# Patient Record
Sex: MALE | Race: WHITE | NOT HISPANIC OR LATINO | Employment: OTHER | URBAN - METROPOLITAN AREA
[De-identification: names, ages, dates, MRNs, and addresses within clinical notes are randomized per-mention and may not be internally consistent; named-entity substitution may affect disease eponyms.]

---

## 2017-01-03 ENCOUNTER — OFFICE VISIT (OUTPATIENT)
Dept: PAIN MEDICINE | Facility: CLINIC | Age: 62
End: 2017-01-03
Payer: MEDICAID

## 2017-01-03 VITALS
WEIGHT: 190 LBS | DIASTOLIC BLOOD PRESSURE: 75 MMHG | HEART RATE: 77 BPM | RESPIRATION RATE: 18 BRPM | BODY MASS INDEX: 25.73 KG/M2 | HEIGHT: 72 IN | SYSTOLIC BLOOD PRESSURE: 131 MMHG

## 2017-01-03 DIAGNOSIS — M19.041 PRIMARY OSTEOARTHRITIS OF RIGHT HAND: ICD-10-CM

## 2017-01-03 DIAGNOSIS — M50.30 DDD (DEGENERATIVE DISC DISEASE), CERVICAL: ICD-10-CM

## 2017-01-03 DIAGNOSIS — M47.812 SPONDYLOSIS OF CERVICAL REGION WITHOUT MYELOPATHY OR RADICULOPATHY: ICD-10-CM

## 2017-01-03 DIAGNOSIS — G89.4 CHRONIC PAIN DISORDER: ICD-10-CM

## 2017-01-03 DIAGNOSIS — M79.18 MYOFASCIAL PAIN: Primary | ICD-10-CM

## 2017-01-03 PROCEDURE — 99214 OFFICE O/P EST MOD 30 MIN: CPT | Mod: S$PBB,,, | Performed by: PHYSICIAN ASSISTANT

## 2017-01-03 PROCEDURE — 99214 OFFICE O/P EST MOD 30 MIN: CPT | Mod: PBBFAC | Performed by: PHYSICIAN ASSISTANT

## 2017-01-03 PROCEDURE — 99999 PR PBB SHADOW E&M-EST. PATIENT-LVL IV: CPT | Mod: PBBFAC,,, | Performed by: PHYSICIAN ASSISTANT

## 2017-01-03 RX ORDER — HYDROCODONE BITARTRATE AND ACETAMINOPHEN 5; 325 MG/1; MG/1
1 TABLET ORAL 3 TIMES DAILY PRN
Qty: 90 TABLET | Refills: 0 | Status: SHIPPED | OUTPATIENT
Start: 2017-02-02 | End: 2017-02-28 | Stop reason: SDUPTHER

## 2017-01-03 RX ORDER — HYDROCODONE BITARTRATE AND ACETAMINOPHEN 5; 325 MG/1; MG/1
1 TABLET ORAL 3 TIMES DAILY PRN
Qty: 90 TABLET | Refills: 0 | Status: SHIPPED | OUTPATIENT
Start: 2017-01-04 | End: 2017-01-03 | Stop reason: SDUPTHER

## 2017-01-03 NOTE — MR AVS SNAPSHOT
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       These Medications        Disp Refills Start End    hydrocodone-acetaminophen 5-325mg (NORCO) 5-325 mg per tablet 90 tablet 0 2017 3/4/2017    Take 1 tablet by mouth 3 (three) times daily as needed for Pain. - Oral    Pharmacy: [facility] [address] [place] [address] AT [facility] Ph #: [phone]         [facility]     [facility] -  Assistance  Registered nurses in the [facility] provide clinical advisement, health education, appointment booking, and other advisory services.  Call for this free service at [phone].             Medications           Message regarding Medications     Verify the changes and/or additions to your medication regime listed below are the same as discussed with your clinician today.  If any of these changes or additions are incorrect, please notify your healthcare provider.             Verify that the below list of  medications is an accurate representation of the medications you are currently taking.  If none reported, the list may be blank. If incorrect, please contact your healthcare provider. Carry this list with you in case of emergency.           Current Medications     acetaminophen (TYLENOL) 650 MG TbSR Take 650 mg by mouth every 8 (eight) hours.    amlodipine (NORVASC) 5 MG tablet TAKE 1 TABLET BY MOUTH EVERY DAY    atorvastatin (LIPITOR) 40 MG tablet TAKE 1 TABLET BY MOUTH ONCE DAILY    butalbital-acetaminophen-caffeine -40 mg (FIORICET, ESGIC) -40 mg per tablet TAKE 1 TABLET BY MOUTH EVERY 4 HOURS AS NEEDED FOR PAIN    cyclobenzaprine (FLEXERIL) 10 MG tablet TAKE 1/2 TO 1 TABLET BY MOUTH EVERY NIGHT AT BEDTIME AS NEEDED FOR SLEEP    FLUVIRIN [id number] 45 mcg (15 mcg x 3)/0.5 mL Susp     hydrocodone-acetaminophen 5-325mg (NORCO) 5-325 mg per tablet Starting on Feb 02, 2017. Take 1 tablet by mouth 3 (three) times daily as needed for Pain.    meloxicam (MOBIC) 15 MG tablet TAKE 1 TABLET(15 MG) BY MOUTH EVERY DAY    omeprazole (PRILOSEC) 20 MG capsule Take 1 capsule (20 mg total) by mouth once daily.    topiramate (TOPAMAX) 50 MG tablet Take 1 tablet (50 mg total) by mouth 2 (two) times daily.    albuterol 90 mcg/actuation inhaler Inhale 2 puffs into the lungs every 4 (four) hours as needed for Wheezing.    diclofenac sodium 1 % Gel Apply 2 g topically 3 (three) times daily. To the knee for pain           Clinical Reference Information           Vital Signs - Last Recorded  Most recent update: 1/3/2017  7:33 AM by Annie De Leon MA    BP Pulse Resp Ht Wt BMI    131/75 (BP Location: Right arm, Patient Position: Sitting, BP Method: Automatic) 77 18 6' (1.829 m) 86.2 kg (190 lb) 25.77 kg/m2      Blood Pressure          Most Recent Value    BP  131/75      Allergies as of 1/3/2017     Morphine      Immunizations Administered on Date of Encounter - 1/3/2017     None      Instructions      Protecting Your Neck:  Posture and Body Mechanics  Protecting your neck from injuries and pain involves practicing good posture and body mechanics. This may mean correcting bad habits you have related to the way you hold and move your body. The tips below can help you improve your posture and body mechanics.     Using good posture while at your workstation can help prevent pain or injury.   What Is Posture and Why Does It Matter?  Posture is the way you hold your body. For many of us, this means hunching over, thrusting the chin forward, and slouching the shoulders. But this kind of poor posture keeps muscles from properly supporting the neck and puts stress on muscles, disks, ligaments, and joints in your neck. As a result, injury and pain can occur.  How Is Your Posture?  Use a full-length mirror to check your posture. To begin, stand normally. Then slowly back up against a wall. Is there space between your head and the wall? Do you slouch your shoulders? Is your chin pointing up or down? All these can cause neck pain and injury.  Improving Your Posture  Follow these steps to improve your posture:  · Pull your shoulders back.  · Think of the ears, shoulders, and hips as a series of dots. Now, adjust your body to connect the dots in a straight line.  · Keep your chin level.  What Are Body Mechanics and Why Do They Matter?  The way you move and position your body during daily activities is called body mechanics. Good body mechanics help protect the neck. This means learning the right ways to stand, sit, and even sleep. So do whats best for your neck and practice good body mechanics.  Standing   To protect your neck while standing:  · Carry objects close to your body.  · Keep your ears and shoulders in a line while standing or walking.  · To lower yourself, bend at the knees with a straight back. Do this instead of looking down and reaching for objects.  · Work at eye level. Dont reach above your head or tilt your head  back.  Sitting   To protect your neck while sitting:  · Set up your workstation so your monitor is at eye level. Also, use a document guthrie when viewing papers or books.  · Keep your knees at or slightly below the level of your hips.  · Sit up straight, with feet flat on the floor. If your feet dont touch the floor, use a footrest.  · Avoid sitting or driving for long periods. Take frequent breaks.  Sleeping   To protect your neck while sleeping:  · Sleep on your back with a pillow under your knees, or on your side with a pillow between bent knees. This helps align the spine.  · Avoid using pillows that are too high or too low. Instead, use a neck roll or pillow under your neck while you sleep to keep the neck straight.  · Sleep on a mattress that supports you, with a pillow under your neck.  © 7251-2958 [employer] [facility], [address] [place] [address]. All rights reserved. This information is not intended as a substitute for professional medical care. Always follow your healthcare professional's instructions.        Reach and Hold Exercise    Do this exercise on your hands and knees. Keep your knees under your hips and your hands under your shoulders. Keep your spine in a neutral position (not arched or sagging). Keep your ears in line with your shoulders. Hold for a few seconds before starting the exercise.  · Tighten your abdominal muscles and raise one arm straight in front of you, palm down. Hold for 5 seconds, then lower. Repeat 5 times.  · Do the exercise again, this time lifting your arm to the side. Repeat 5 times.  · Do the exercise again, this time lifting your arm backward, palm up. Repeat 5 times.  Switch sides and do each exercise with the other arm.  © 7073-0063 [employer] [facility], [address] [place] [address]. All rights reserved. This information is not intended as a substitute for professional medical care. Always follow your healthcare professional's  instructions.        Shoulder and Upper Back Stretch  To start, stand tall with your ears, shoulders, and hips in line. Your feet should be slightly apart, positioned just under your hips. Focus your eyes directly in front of you.  this position for a few seconds before starting your exercise. This helps increase your awareness of proper posture.  · Reach overhead and slightly back with both arms. Keep your shoulders and neck aligned and your elbows behind your shoulders.  · With your palms facing the ceiling, turn your fingers inward.  · Take a deep breath. Breathe out and lower your elbows toward your buttocks. Hold for 5 seconds, then return to starting position.  · Repeat 3 times.       © 2871-6390 [employer] [facility], [address] [place] [address], [place] [address]. All rights reserved. This information is not intended as a substitute for professional medical care. Always follow your healthcare professional's instructions.        Torticollis (Wry Neck)  Torticollis occurs when muscles on one side of the neck contract (tighten). This causes the neck to twist or tilt to the side. The muscles may also be quite sore. It affects mainly children and young adults, often appearing overnight. It can also affect infants who develop tight neck muscles on one side.  What Causes Torticollis?  Causes of torticollis include:  · Damage to the neck muscles from an accident or other trauma  · Side effect of certain medications or drugs  · Infection of the airways, such as a sore throat  When to Go to the Emergency Room (ER)  All neck problems should be checked by a healthcare provider within 24 hours. Seek emergency care if you can't reach your doctor or these symptoms are present:  · Trouble breathing or swallowing  · Numbness or weakness in the arms and legs  · Trouble walking or speaking  What to Expect in the ER  The neck will be examined, and questions about any current or former medical problems will be asked. X-rays of the  neck may be taken to check for broken bones.  Treatment  The goal in treating torticollis is to relax the neck muscles. The best approach will depend on the cause of the problem. In most cases, one or more of the following may be given:  · Medications to help relax the muscles and reduce swelling  · Hot and cold compresses to help ease muscle tightness  · Botulinum toxin (Botox) injections to prevent further muscle spasms  · A soft neck collar to ease discomfort and help healing  · Physical therapy to help stretch and relax the muscles  Follow-up  Depending upon the cause, torticollis often goes away on its own. Follow up with your healthcare provider as instructed. If symptoms become worse, call your doctor or return to the ER.  © 5556-7836 [employer] [facility], [address] [place] [address], [place] [address]. All rights reserved. This information is not intended as a substitute for professional medical care. Always follow your healthcare professional's instructions.        Understanding Neck Problems       If you suffer from neck pain, youre not alone. Many people have neck pain at some point in their lives. Problems such as poor posture, injury, and wear and tear can lead to neck pain. Your health care provider will work with you to find the treatment thats best for your neck.  Types of Neck Problems  The following problems can cause pain or injury in your neck:  · Strains and sprains: Strains (stretched or torn muscles) and sprains (stretched or torn ligaments) can cause neck pain. Strains and sprains can occur during an accident, or when you overuse your neck through repetitive motion. They can also cause your muscles and ligaments to become inflamed (swollen and painful).  · Whiplash and other injuries: Whiplash can result when an impact throws your head, forcing your neck too far forward (hyperflexion), then too far backward (hyperextension). When combined, the two motions can cause a painful injury to different parts  of your neck, such as muscles, ligaments, or joints. The most common cause of whiplash is a car accident. But it can also happen during a fall or sports injury.  · Weakened disks: A simple action, such as a sneeze or a cough, can cause one of your disks to bulge (herniate). A herniated disk can put pressure on your nerve and cause pain. Over time, disks can also thin out (degenerate). Flattened disks dont cushion vertebrae well and can cause vertebrae to rub together. Rubbing vertebrae can pinch nerves and cause pain.  · Weakened joints: Aging and injury can cause joints to slowly degenerate. Thinned joints can also cause vertebrae to rub together. This can cause abnormal growths of bone (bone spurs) to form on vertebrae. Bone spurs put pressure on nerves, causing pain.  Common Symptoms  If you have a neck problem, you may have one or more of the following symptoms:  · Muscle tension and spasm: You may not be able to move your neck, arms, or shoulders comfortably if you have muscle tension or stiffness in your neck. If your symptoms arent relieved, you may experience muscle spasms, or knots of contracted tissue (trigger points) in areas of your neck and shoulders.  · Aches and pains: Dull aches in your head or neck, sharp pains, and swelling of the soft tissue of your neck and shoulders are common symptoms. If theres pressure on the nerves in your neck, you may feel pain in your arms or hands (referred pain).  · Numbness or weakness: If you injure the nerves in your neck, you may experience numbness, tingling, or weakness in your shoulders, arms, or hands. These symptoms arise when disks or bone spurs press on the nerves in your neck.  © 4169-2166 [employer] [facility], [address] [place] [address], [place] [address]. All rights reserved. This information is not intended as a substitute for professional medical care. Always follow your healthcare professional's instructions.        Neck Exercises: Active Neck Rotation  To  start, lie on your back, knees bent and feet flat on the floor. Keep your ears, shoulders, and hips aligned, but dont press your lower back to the floor. Rest your hands on your pelvis. Breathe deeply and relax.  · Use your neck muscles to turn your head to one side until you feel a stretch in the muscles.  · Hold for 5 seconds. Then turn to the other side.  · Repeat 5 times on each side.  Note: Keep your shoulders on the floor. Dont lift or tuck your chin as you turn your head.  © 5915-8621 Dinh \[facility]", [address] [place] [address]. All rights reserved. This information is not intended as a substitute for professional medical care. Always follow your healthcare professional's instructions.        Neck Exercises: Arm Lift            To start, lie on your back, knees bent and feet flat on the floor. Keep your ears, shoulders, and hips aligned, but dont press your lower back to the floor. Rest your hands on your pelvis. Breathe deeply and relax.  1. Raise one arm overhead, then lower it. As you lower that arm, raise the other arm.  2. Continue to move both arms in slow, smooth arcs. Keep your arms straight and your head and neck relaxed.  3. Repeat 10 times with each arm.  For your safety, check with your healthcare provider before starting an exercise program.   © 7812-0109 [facility], [address] [place] [address]. All rights reserved. This information is not intended as a substitute for professional medical care. Always follow your healthcare professional's instructions.        Exercises: Neck Isometrics  To start, sit in a chair with your feet flat on the floor. Your weight should be slightly forward so that youre balanced evenly on your buttocks. Relax your shoulders and keep your head level. Using a chair with arms may help you keep your balance.  4. Press your palm against your forehead. Resist with your neck muscles. Hold for 10 seconds. Relax. Repeat 5 times.  5. Do the  exercise again, pressing on the side of your head. Repeat 5 times. Switch sides.  6. Do the exercise again, pressing on the back of your head. Repeat 5 times.       For your safety, check with your healthcare provider before starting an exercise program.   © 4741-4419 [place] [address]. All rights reserved. This information is not intended as a substitute for professional medical care. Always follow your healthcare professional's instructions.        Neck Exercises: Passive Neck Rotation        To start, lie on your back, knees bent and feet flat on the floor. Keep your ears, shoulders, and hips aligned, but dont press your lower back to the floor. Rest your hands on your pelvis. Breathe deeply and relax.  · With your neck relaxed, place the palm of one hand on your forehead. Use your hand to turn your head to one side until you feel a stretch in the neck muscles. Do not push through pain.  · Hold for 5 seconds. Then turn to the other side.  · Repeat 5 times on each side.   Note: Keep your shoulders on the floor. Dont lift your chin as you turn your head.  © 6442-7746 [employer] [facility], [address] [place] [address]. All rights reserved. This information is not intended as a substitute for professional medical care. Always follow your healthcare professional's instructions.        Neck Pain [No Trauma]  There are several possible causes of neck pain without injury:  · You can get a minor ligament sprain or muscle strain from a sudden minor neck movement. Sleeping with your neck in an awkward position can also cause this.  · Some persons respond to emotional stress by tensing the muscles of their neck, shoulders and upper back. Chronic spasm in these muscles can cause neck pain and sometimes headaches.  · Gradual wear and tear of the joints in the spine can cause degenerative arthritis. This can be a source of occasional or chronic neck pain.  · With aging or  repeated small injuries to the neck, the spinal disks (the cushions between each spinal bone) may bulge and put pressure on a nearby spinal nerve. This causes tingling, pain or numbness spreading from the neck to the shoulder, arm or hand on one side.  Acute neck pain usually gets better in one to two weeks. Neck pain related to disk disease, arthritis in the spinal joints or spinal stenosis (narrowing of the spinal canal) can become chronic and last for months or years.  Unless you had a forceful physical injury (for example, a car accident or fall), X-rays are usually not ordered for the initial evaluation of neck pain. If pain continues and does not respond to medical treatment, x-rays and other tests may be performed at a later time.  Home Care:  · Rest and relax the muscles. Use a comfortable pillow that supports the head and keeps the spine in a neutral position. The position of the head should not be tilted forward or backward. A rolled up towel may help for a custom fit.  · Some persons find relief with heat (hot shower, hot bath or heating pad) and massage, while others prefer cold packs (crushed or cubed ice in a plastic bag, wrapped in a towel) . Try both and use the method that feels best for 20 minutes several times a day.  · You may use acetaminophen (Tylenol) or ibuprofen (Motrin, Advil) to control pain, unless another medicine was prescribed. [ NOTE : If you have chronic liver or kidney disease or ever had a stomach ulcer or GI bleeding, talk with your doctor before using these medicines.]  Follow Up  with your physician or this facility if your symptoms do not show signs of improvement after one week. Physical therapy or further tests may be needed.  [NOTE: A radiologist will review any X-rays or CT scans that were taken. We will notify you of any new findings that may affect your care.]  Get Prompt Medical Attention  if any of the following occur:  · Pain becomes worse or spreads into one or both  arms  · Weakness or numbness in one or both arms  · Increasing headache  · Neck swelling, difficulty or painful swallowing  · Fever of 100.4ºF (38ºC) or higher, or as directed by your healthcare provider  © 0613-6872 [facility], [address] [place] [address], [place] [address]. All rights reserved. This information is not intended as a substitute for professional medical care. Always follow your healthcare professional's instructions.      Neck Problems: Relieving Your Symptoms  The first goal of treatment is to relieve your symptoms. Your health care provider may recommend self-care treatments. These include resting, applying ice and heat, taking medication, and doing exercises. Your health care provider may also recommend that you see a physical therapist, who can teach you ways to care for and strengthen your neck.     Heat relaxes sore muscles and helps relieve spasms.   Self-Care Treatments  Pain can end quickly or last awhile. Either way, youll want relief as soon as possible. Your health care provider can tell you which treatments to do at home to help relieve your pain.  · Lying down for a short time takes pressure from the head off the neck.  · Ice and heat can help reduce pain. To bring down swelling, rest an ice pack wrapped in a thin towel on your neck for 15 minutes. To relax sore muscles, apply a warm, wet towel to the area. Or take a warm bath or shower.  · Over-the-counter medications, such as ibuprofen, naproxen, and aspirin, can help reduce pain and swelling. Acetaminophen can help relieve pain. Use these only as directed.  · Exercises can relax muscles and prevent stiffness. To prepare, drape a warm, wet towel around your neck and shoulders for 5 minutes. Remove the towel. Then do any exercises recommended to you by your health care provider.  Physical Therapy  If self-care treatments arent helping relieve neck pain, your health care provider may suggest one or more sessions of physical therapy.  Physical therapy is performed by a specialist trained to treat injuries. Your physical therapist (PT) will teach you how to strengthen muscles, improve the spines alignment, and help you move properly. Treatment methods used in physical therapy may include:  · Heat. A special heating pad called a neck pack may be applied to your neck.  · Exercises. Your PT will teach you exercises to help strengthen your neck and improve its range of motion.  · Joint mobilization. The PT gently moves your vertebrae to help restore motion in your neck joints and reduce neck pain.  · Soft tissue mobilization. The PT massages and stretches the muscles in your neck and shoulders.  · Electrical stimulation. Electrical impulses are sent into your neck. This helps reduce soreness and inflammation.  · Education in body mechanics. The PT shows you ways to position and move your body that protect the neck.  Other Treatments  If physical therapy doesnt relieve your neck pain, your health care provider may suggest other treatments. For example, medications or injections can help relieve pain and swelling. In some cases, surgery may be needed to treat neck problems.  © 2057-3385 [facility], [address] [place] [address], [place] [address]. All rights reserved. This information is not intended as a substitute for professional medical care. Always follow your healthcare professional's instructions.           Smoking Cessation     If you would like to quit smoking:   You may be eligible for free services if you are a [place] resident and started smoking cigarettes before September 1, 1988.  Call the Smoking Cessation Trust (SCT) toll free at (599) 712-9592 or (124) 976-5576.   Call 1-800-QUIT-NOW if you do not meet the above criteria.

## 2017-01-03 NOTE — PROGRESS NOTES
Chief Pain Complaint:  Neck Pain, Bilateral Arm Pain    History of Present Illness:  This patient is a 61 y.o. male who presents today complaining of the above noted pain/s. The patient describes this pain as follows.    - duration of pain: pain for years   - timing: constant   - character: sharp, aching  - radiating, dermatomal: extends to the shoulders b/l, nondermatomal  - antecedent trauma, prior spinal surgery: no prior trauma, no prior spinal surgery, bilateral shoulder surgery  - pertinent negatives: No fever, No chills, No weight loss, No bladder dysfunction, No bowel dysfunction, No saddle anesthesia  - pertinent positives: generalized nonspecific Upper Extremity weakness bilaterally    - medications, other therapies tried (physical therapy, injections):     >> Norco, flexeril, gabapentin, Mobic, Topamax, Fioricet    >> Has previously undergone Physical Therapy - which he does not find helpful but does exercises at home    >> Has previously undergone spinal injection/s      IMAGING / Labs / Studies (reviewed on 1/3/2017):    5-12-16 XR Lumbar and Thoracic:  Findings: There is slight levoscoliotic curvature of the lumbar spine.  There is grade 2 anterolisthesis of L5 on S1 with minimal grade 1 anterolisthesis of L4 on L5 and slight retrolisthesis of L3 on L4.  Moderate to severe disc height loss present at L5-S1 with mild disc height loss at L3-L4 and L4-L5. Bilateral facet arthropathy present at L4-L5 and L5-S1. No acute fractures visualized.  The remaining visualized osseous and soft tissue structures demonstrate no appreciable abnormality.    Findings: There is mild dextroscoliotic curvature of the thoracic spine.  There are multiple mild age-indeterminate compression deformities involving the mid and lower thoracic spine with suspected involvement of T6-T11.  There is mild multilevel disc height loss noted throughout the thoracic spine with associated disc osteophyte complex production. Posterior  elements appear grossly intact.   The remaining visualized osseous and soft tissue structures demonstrate no appreciable abnormality.      4/28/14 MRI Cervical Spine Without Contrast    Narrative Study:   Cervical spine MRI without contrast.04/28/14 11:09:00  Technique:  Multiplanar non contrast images obtained on the cervical spine.   History: Left neck pain.  Left shoulder pain.  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  No abnormal signal in the cord.  The craniocervical junction is normal.  C2-3: Facet hypertrophy on the left side with mild left sided neural foraminal narrowing  C3-4: Disk space narrowing.  Broad-based osteophyte disk foot.  Bilateral uncovertebral-joint disease with moderate severe neural frontal narrowing.  C4-5: Disk space narrowing.  Broad-based osteophyte disk foot.  Bilateral uncovertebral joint disease with mild to severe neural foraminal narrowing.  Decreased CSF spaces anterior-posterior to the cord.  C5-6: Broad-based osteophyte and disk bulge.  Bilateral uncovertebral joint disease with mild to moderate bilateral bony neural frontal narrowing greater on the left side.  Disk protrusion into the left lateral recess.  C6-7: Disk space narrowing.  Broad-based osteophyte and disk bulge bilateral uncovertebral joint disease with mild neural foraminal narrowing.  C7-T1: Mild to space narrowing.  Mild uncovertebral joint disease.  No significant neural frontal narrowing.         Review of Systems:  CONSTITUTIONAL: patient denies any fever, chills, or weight loss  SKIN: patient denies any rash or itching  RESPIRATORY: patient denies having any shortness of breath  GASTROINTESTINAL: patient denies having any diarrhea, constipation, or bowel incontinence  GENITOURINARY: patient denies having any abnormal bladder function    MUSCULOSKELETAL:  - patient complains of the above noted pain/s (see chief pain complaint)    NEUROLOGICAL:   - pain as above  - strength in Upper extremities is  decreased, BILATERALLY  - sensation in Upper extremities is abnormal, BILATERALLY  - patient denies any loss of bowel or bladder control      PSYCHIATRIC: patient denies any change in mood      Physical Exam:  Visit Vitals    /75 (BP Location: Right arm, Patient Position: Sitting, BP Method: Automatic)    Pulse 77    Resp 18    Ht 6' (1.829 m)    Wt 86.2 kg (190 lb)    BMI 25.77 kg/m2   (reviewed on 1/3/2017)    General: alert and oriented  Gait: normal gait  Skin: No rashes, No discoloration, No obvious lesions  HEENT: EOMI  Respiratory: respirations nonlabored    Musculoskeletal:  - Any pain on flexion, extension, rotation:    >> pain on flexion, extension, and rotation of cervical spine  - Spurling's Test: causes No radiating pain  - Any tenderness to palpation across paraspinal muscles, joints, bursae:     >> tender to palpation across cervical paraspinals    >> TTP over bilateral hand joints, worse on right hand  - Right hand in brace today    Neuro:  - Extremity Strength:     >> LEFT :: 5/5    >> RIGHT :: 5/5  - Extremity Reflexes:    >> LEFT  :: 1+    >> RIGHT :: 1+  - Sensory (sensation to light touch):    >> LEFT :: intact    >> RIGHT :: intact  - Luciano's sign:     >> LEFT :: negative    >> RIGHT :: negative      Psych:  Mood and affect is appropriate      Assessment:  Cervical Spondylosis  Myofascial Pain  Cervical DDD      Plan:  Patient returns for follow-up. He has widespread joint pain, including cervical facet pain along with myofascial pain.   - He had trigger point injections with Dr. Nunes in September and with me on 11-29-16. He is still reporting great relief from this.  - Will refill gabapentin 300mg TID and Norco 5mg TID PRN x 2 months, which he reports is helping his pain.  - We can consider adding an antidepressant, possibly Cymbalta, moving forward.   - [place]  reviewed and appropriate. He last filled on 12-7-16.  - Will order UDS today to ensure compliance.   RTC in 2 months  for med refill. I discussed the risks, benefits, and alternatives to potential treatment options. All questions and concerns were fully addressed today in clinic. Dr. Nunes was consulted regarding the patient plan and agrees.          >>PDI:  8-11-16 :: 74  9-6-16 :: 120  11-8-16 :: 54  1/3/2017 :: 129    >>UDS:  8-11-16 :: appropriate (+hydrocodone metabolites, +barbituates)  1/3/2017 :: pending    >>Opioid Risk Tool:  1/3/2017 :: 6

## 2017-01-18 ENCOUNTER — TELEPHONE (OUTPATIENT)
Dept: PAIN MEDICINE | Facility: CLINIC | Age: 62
End: 2017-01-18

## 2017-01-18 NOTE — TELEPHONE ENCOUNTER
----- Message from Ana Bower sent at 1/18/2017  2:58 PM CST -----  Contact: Pt  Pt needs to know the date of his last neck injection. Pls call pt back at [phone].

## 2017-01-23 ENCOUNTER — TELEPHONE (OUTPATIENT)
Dept: NEUROLOGY | Facility: CLINIC | Age: 62
End: 2017-01-23

## 2017-01-23 NOTE — TELEPHONE ENCOUNTER
----- Message from Laura To sent at 1/23/2017  9:34 AM CST -----  Contact: pt  States he is having headache and he would like to be seen today. Please call pt at [phone]. Thank you

## 2017-01-25 ENCOUNTER — OFFICE VISIT (OUTPATIENT)
Dept: NEUROLOGY | Facility: CLINIC | Age: 62
End: 2017-01-25
Payer: MEDICAID

## 2017-01-25 VITALS
HEIGHT: 71 IN | DIASTOLIC BLOOD PRESSURE: 70 MMHG | BODY MASS INDEX: 28.18 KG/M2 | WEIGHT: 201.25 LBS | SYSTOLIC BLOOD PRESSURE: 150 MMHG | HEART RATE: 70 BPM

## 2017-01-25 DIAGNOSIS — G43.019 INTRACTABLE MIGRAINE WITHOUT AURA AND WITHOUT STATUS MIGRAINOSUS: Primary | ICD-10-CM

## 2017-01-25 PROCEDURE — 99213 OFFICE O/P EST LOW 20 MIN: CPT | Mod: PBBFAC | Performed by: PSYCHIATRY & NEUROLOGY

## 2017-01-25 PROCEDURE — 99999 PR PBB SHADOW E&M-EST. PATIENT-LVL III: CPT | Mod: PBBFAC,,, | Performed by: PSYCHIATRY & NEUROLOGY

## 2017-01-25 PROCEDURE — 99214 OFFICE O/P EST MOD 30 MIN: CPT | Mod: S$PBB,,, | Performed by: PSYCHIATRY & NEUROLOGY

## 2017-01-25 RX ORDER — METHYLPREDNISOLONE 4 MG/1
TABLET ORAL
Qty: 1 PACKAGE | Refills: 0 | Status: SHIPPED | OUTPATIENT
Start: 2017-01-25 | End: 2017-01-30 | Stop reason: ALTCHOICE

## 2017-01-25 NOTE — PROGRESS NOTES
"Subjective:      John Laguna is a 61 y.o. male who presents for follow-up of migraine headaches. Home treatment has included Fiorinal and Percocet with little improvement. Headaches are occurring several times per week. Generally, the headaches last about a few days. Work attendance or other daily activities are affected by the headaches. The patient denies decreased physical activity.  The patient reports that he has been experiencing a continuous headache for the past 3 weeks with a gradual increase in pain to a pain level of 10/10 today.  In association with a headache pain, he does notice photophobia and phonophobia but does not experience any nausea or vomiting.  The patient has been taking analgesics on a very frequent and regular basis almost daily however.    The following portions of the patient's history were reviewed and updated as appropriate: problem list.    Review of Systems  Constitutional: positive for fatigue and malaise  Eyes: positive for visual disturbance  Respiratory: negative  Cardiovascular: negative  Gastrointestinal: negative  Neurological: positive for headaches      Objective:        Visit Vitals    BP (!) 150/70    Pulse 70    Ht 5' 11" (1.803 m)    Wt 91.3 kg (201 lb 4.5 oz)    BMI 28.07 kg/m2       General Appearance:    Alert, cooperative, no distress, appears stated age.  The patient appears to be in acute headache pain.     Head:    Normocephalic, without obvious abnormality, atraumatic   Eyes:    PERRL, conjunctiva/corneas clear, EOM's intact, fundi     benign, both eyes        Ears:    Normal TM's and external ear canals, both ears   Nose:   Nares normal, septum midline, mucosa normal, no drainage    or sinus tenderness   Throat:   Lips, mucosa, and tongue normal; teeth and gums normal   Neck:   Supple, symmetrical, trachea midline, no adenopathy;        thyroid:  No enlargement/tenderness/nodules; no carotid    bruit or JVD   Back:     Symmetric, no curvature, ROM " normal, no CVA tenderness   Lungs:     Clear to auscultation bilaterally, respirations unlabored   Chest wall:    No tenderness or deformity   Heart:    Regular rate and rhythm, S1 and S2 normal, no murmur, rub   or gallop   Abdomen:     Soft, non-tender, bowel sounds active all four quadrants,     no masses, no organomegaly   Genitalia:    Normal male without lesion, discharge or tenderness   Rectal:    Normal tone, normal prostate, no masses or tenderness;    guaiac negative stool   Extremities:   Extremities normal, atraumatic, no cyanosis or edema   Pulses:   2+ and symmetric all extremities   Skin:   Skin color, texture, turgor normal, no rashes or lesions   Lymph nodes:   Cervical, supraclavicular, and axillary nodes normal   Neurologic:   CNII-XII intact. Normal strength, sensation and reflexes       throughout         Assessment:      Analgesic rebound headache  Common migraine      Plan:      Lie in darkened room and apply cold packs as needed for pain.  Prophylactic therapy: Topiramate due to high frequency of pain.  Side effect profile discussed in detail.   Trial of Solu-Medrol Dosepak to abort this headache.  The patient was also asked to limit the amount of analgesics that he is taking.

## 2017-01-30 ENCOUNTER — OFFICE VISIT (OUTPATIENT)
Dept: INTERNAL MEDICINE | Facility: CLINIC | Age: 62
End: 2017-01-30
Payer: MEDICAID

## 2017-01-30 VITALS
WEIGHT: 203.69 LBS | HEIGHT: 71 IN | DIASTOLIC BLOOD PRESSURE: 78 MMHG | HEART RATE: 76 BPM | TEMPERATURE: 98 F | OXYGEN SATURATION: 93 % | SYSTOLIC BLOOD PRESSURE: 120 MMHG | BODY MASS INDEX: 28.52 KG/M2 | RESPIRATION RATE: 19 BRPM

## 2017-01-30 DIAGNOSIS — E78.2 MIXED HYPERLIPIDEMIA: ICD-10-CM

## 2017-01-30 DIAGNOSIS — J44.9 CHRONIC OBSTRUCTIVE PULMONARY DISEASE, UNSPECIFIED COPD TYPE: ICD-10-CM

## 2017-01-30 DIAGNOSIS — I10 ESSENTIAL HYPERTENSION: Primary | ICD-10-CM

## 2017-01-30 PROCEDURE — 99213 OFFICE O/P EST LOW 20 MIN: CPT | Mod: S$PBB,,, | Performed by: FAMILY MEDICINE

## 2017-01-30 PROCEDURE — 99999 PR PBB SHADOW E&M-EST. PATIENT-LVL III: CPT | Mod: PBBFAC,,, | Performed by: FAMILY MEDICINE

## 2017-01-30 PROCEDURE — 99213 OFFICE O/P EST LOW 20 MIN: CPT | Mod: PBBFAC,PO | Performed by: FAMILY MEDICINE

## 2017-01-30 NOTE — PROGRESS NOTES
Chief Complaint:    Chief Complaint   Patient presents with    Pre-op Exam       History of Present Illness:  Patient is here to have preop forms filled out for cataract extraction.  He has hypertension hyperlipidemia COPD.  No new complaints.      ROS:  Review of Systems   Constitutional: Negative for activity change, chills, fatigue, fever and unexpected weight change.   HENT: Negative for congestion, ear discharge, ear pain, hearing loss, postnasal drip and rhinorrhea.    Eyes: Negative for pain and visual disturbance.   Respiratory: Negative for cough, chest tightness and shortness of breath.    Cardiovascular: Negative for chest pain and palpitations.   Gastrointestinal: Negative for abdominal pain, diarrhea and vomiting.   Endocrine: Negative for heat intolerance.   Genitourinary: Negative for dysuria, flank pain, frequency and hematuria.   Musculoskeletal: Negative for back pain, gait problem and neck pain.   Skin: Negative for color change and rash.   Neurological: Negative for dizziness, tremors, seizures, numbness and headaches.   Psychiatric/Behavioral: Negative for agitation, hallucinations, self-injury, sleep disturbance and suicidal ideas. The patient is not nervous/anxious.        Past Medical History   Diagnosis Date    Arthritis     Brain aneurysm     Cancer 2012     PROSTATE    COPD (chronic obstructive pulmonary disease)     Headache(784.0)     Hypertension      ON MEDS       Social History:  Social History     Social History    Marital status: Single     Spouse name: N/A    Number of children: N/A    Years of education: N/A     Social History Main Topics    Smoking status: Current Every Day Smoker     Packs/day: 0.25     Years: 40.00     Types: Cigarettes    Smokeless tobacco: Never Used      Comment: instructed not to smoke after midnight    Alcohol use No    Drug use: No    Sexual activity: Not Asked     Other Topics Concern    None     Social History Narrative       Family  "History:   family history includes Aneurysm in his paternal uncle; Heart disease in his brother, father, mother, and sister.    Health Maintenance   Topic Date Due    Zoster Vaccine  05/06/2015    PROSTATE-SPECIFIC ANTIGEN  05/03/2017    Lipid Panel  05/03/2017    Pneumococcal PPSV23 (Medium Risk) (2) 05/06/2020    Colonoscopy  09/10/2023    TETANUS VACCINE  09/05/2024    Hepatitis C Screening  Completed    Influenza Vaccine  Completed       Physical Exam:    Vital Signs  Temp: 97.6 °F (36.4 °C)  Temp src: Tympanic  Pulse: 76  Resp: 19  SpO2: (!) 93 %  BP: 120/78  BP Location: Left arm  BP Method: Manual  Patient Position: Sitting  Pain Score: 0-No pain  Height and Weight  Height: 5' 11" (180.3 cm)  Weight: 92.4 kg (203 lb 11.3 oz)  BSA (Calculated - sq m): 2.15 sq meters  BMI (Calculated): 28.5  Weight in (lb) to have BMI = 25: 178.9]    Body mass index is 28.41 kg/(m^2).    Physical Exam   Constitutional: He is oriented to person, place, and time. He appears well-developed.   HENT:   Mouth/Throat: Oropharynx is clear and moist.   Eyes: Conjunctivae are normal. Pupils are equal, round, and reactive to light.   Neck: Normal range of motion. Neck supple.   Cardiovascular: Normal rate, regular rhythm and normal heart sounds.    No murmur heard.  Pulmonary/Chest: Effort normal and breath sounds normal. No respiratory distress. He has no wheezes. He has no rales. He exhibits no tenderness.   Abdominal: Soft. He exhibits no distension and no mass. There is no tenderness. There is no guarding.   Musculoskeletal: He exhibits no edema or tenderness.   Lymphadenopathy:     He has no cervical adenopathy.   Neurological: He is alert and oriented to person, place, and time. He has normal reflexes.   Skin: Skin is warm and dry.   Psychiatric: He has a normal mood and affect. His behavior is normal. Judgment and thought content normal.       Lab Results   Component Value Date    CHOL 177 05/03/2016    CHOL 160 10/02/2015 "    CHOL 146 11/25/2014    TRIG 106 05/03/2016    TRIG 88 10/02/2015    TRIG 93 11/25/2014    HDL 65 05/03/2016    HDL 49 10/02/2015    HDL 44 11/25/2014    TOTALCHOLEST 2.7 05/03/2016    TOTALCHOLEST 3.3 10/02/2015    TOTALCHOLEST 3.3 11/25/2014    NONHDLCHOL 112 05/03/2016    NONHDLCHOL 111 10/02/2015    NONHDLCHOL 102 11/25/2014       Lab Results   Component Value Date    HGBA1C 5.4 10/02/2015       Assessment:      ICD-10-CM ICD-9-CM   1. Essential hypertension I10 401.9   2. Chronic obstructive pulmonary disease, unspecified COPD type J44.9 496   3. Mixed hyperlipidemia E78.2 272.2         Plan:  Patient's forms have been filled out.  He is cleared to undergo surgery under local anesthesia.  No orders of the defined types were placed in this encounter.      Current Outpatient Prescriptions   Medication Sig Dispense Refill    acetaminophen (TYLENOL) 650 MG TbSR Take 650 mg by mouth every 8 (eight) hours.      amlodipine (NORVASC) 5 MG tablet TAKE 1 TABLET BY MOUTH EVERY DAY 30 tablet 7    atorvastatin (LIPITOR) 40 MG tablet TAKE 1 TABLET BY MOUTH ONCE DAILY 30 tablet 5    butalbital-acetaminophen-caffeine -40 mg (FIORICET, ESGIC) -40 mg per tablet TAKE 1 TABLET BY MOUTH EVERY 4 HOURS AS NEEDED FOR PAIN 30 tablet 0    cyclobenzaprine (FLEXERIL) 10 MG tablet TAKE 1/2 TO 1 TABLET BY MOUTH EVERY NIGHT AT BEDTIME AS NEEDED FOR SLEEP 30 tablet 0    [START ON 2/2/2017] hydrocodone-acetaminophen 5-325mg (NORCO) 5-325 mg per tablet Take 1 tablet by mouth 3 (three) times daily as needed for Pain. 90 tablet 0    meloxicam (MOBIC) 15 MG tablet TAKE 1 TABLET(15 MG) BY MOUTH EVERY DAY 30 tablet 0    omeprazole (PRILOSEC) 20 MG capsule Take 1 capsule (20 mg total) by mouth once daily. 30 capsule 6    topiramate (TOPAMAX) 50 MG tablet Take 1 tablet (50 mg total) by mouth 2 (two) times daily. 60 tablet 11    albuterol 90 mcg/actuation inhaler Inhale 2 puffs into the lungs every 4 (four) hours as needed for  Wheezing. 1 Inhaler 1    diclofenac sodium 1 % Gel Apply 2 g topically 3 (three) times daily. To the knee for pain 100 g 0     No current facility-administered medications for this visit.        Medications Discontinued During This Encounter   Medication Reason    methylPREDNISolone (MEDROL DOSEPACK) 4 mg tablet Therapy completed    FLUVIRIN [id number] 45 mcg (15 mcg x 3)/0.5 mL Susp Therapy completed       No Follow-up on file.      Dr Tori Rosenberg MD    Disclaimer: This note is prepared using voice recognition system and as such is likely to have errors and is not proof read.

## 2017-02-09 RX ORDER — AMLODIPINE BESYLATE 5 MG/1
TABLET ORAL
Qty: 90 TABLET | Refills: 0 | Status: SHIPPED | OUTPATIENT
Start: 2017-02-09 | End: 2017-05-24 | Stop reason: SDUPTHER

## 2017-02-15 DIAGNOSIS — M79.18 MYOFASCIAL PAIN: ICD-10-CM

## 2017-02-15 DIAGNOSIS — M47.812 SPONDYLOSIS OF CERVICAL REGION WITHOUT MYELOPATHY OR RADICULOPATHY: ICD-10-CM

## 2017-02-15 RX ORDER — GABAPENTIN 300 MG/1
CAPSULE ORAL
Qty: 90 CAPSULE | Refills: 0 | Status: SHIPPED | OUTPATIENT
Start: 2017-02-15 | End: 2017-03-01 | Stop reason: SDUPTHER

## 2017-02-28 RX ORDER — HYDROCODONE BITARTRATE AND ACETAMINOPHEN 5; 325 MG/1; MG/1
1 TABLET ORAL 3 TIMES DAILY PRN
Qty: 90 TABLET | Refills: 0 | Status: SHIPPED | OUTPATIENT
Start: 2017-03-03 | End: 2017-02-28 | Stop reason: SDUPTHER

## 2017-02-28 RX ORDER — HYDROCODONE BITARTRATE AND ACETAMINOPHEN 5; 325 MG/1; MG/1
1 TABLET ORAL 3 TIMES DAILY PRN
Qty: 90 TABLET | Refills: 0 | Status: SHIPPED | OUTPATIENT
Start: 2017-04-01 | End: 2017-05-01

## 2017-02-28 RX ORDER — HYDROCODONE BITARTRATE AND ACETAMINOPHEN 5; 325 MG/1; MG/1
1 TABLET ORAL 3 TIMES DAILY PRN
Qty: 90 TABLET | Refills: 0 | Status: SHIPPED | OUTPATIENT
Start: 2017-04-30 | End: 2017-05-23 | Stop reason: SDUPTHER

## 2017-03-01 ENCOUNTER — OFFICE VISIT (OUTPATIENT)
Dept: PAIN MEDICINE | Facility: CLINIC | Age: 62
End: 2017-03-01
Payer: MEDICAID

## 2017-03-01 VITALS
HEART RATE: 75 BPM | BODY MASS INDEX: 28.42 KG/M2 | SYSTOLIC BLOOD PRESSURE: 136 MMHG | WEIGHT: 203 LBS | DIASTOLIC BLOOD PRESSURE: 76 MMHG | HEIGHT: 71 IN

## 2017-03-01 DIAGNOSIS — M50.30 DDD (DEGENERATIVE DISC DISEASE), CERVICAL: Primary | ICD-10-CM

## 2017-03-01 DIAGNOSIS — G89.4 CHRONIC PAIN DISORDER: ICD-10-CM

## 2017-03-01 DIAGNOSIS — M79.18 MYOFASCIAL PAIN: ICD-10-CM

## 2017-03-01 DIAGNOSIS — M47.812 SPONDYLOSIS OF CERVICAL REGION WITHOUT MYELOPATHY OR RADICULOPATHY: ICD-10-CM

## 2017-03-01 DIAGNOSIS — M19.041 PRIMARY OSTEOARTHRITIS OF RIGHT HAND: ICD-10-CM

## 2017-03-01 PROCEDURE — 99213 OFFICE O/P EST LOW 20 MIN: CPT | Mod: PBBFAC | Performed by: PHYSICIAN ASSISTANT

## 2017-03-01 PROCEDURE — 99214 OFFICE O/P EST MOD 30 MIN: CPT | Mod: S$PBB,,, | Performed by: PHYSICIAN ASSISTANT

## 2017-03-01 PROCEDURE — 99999 PR PBB SHADOW E&M-EST. PATIENT-LVL III: CPT | Mod: PBBFAC,,, | Performed by: PHYSICIAN ASSISTANT

## 2017-03-01 RX ORDER — TRIAMCINOLONE ACETONIDE 40 MG/ML
40 INJECTION, SUSPENSION INTRA-ARTICULAR; INTRAMUSCULAR
Status: COMPLETED | OUTPATIENT
Start: 2017-03-01 | End: 2017-03-01

## 2017-03-01 RX ORDER — PREDNISOLONE ACETATE 10 MG/ML
SUSPENSION/ DROPS OPHTHALMIC
COMMUNITY
Start: 2017-02-14 | End: 2018-02-16

## 2017-03-01 RX ORDER — GABAPENTIN 300 MG/1
300 CAPSULE ORAL 3 TIMES DAILY
Qty: 90 CAPSULE | Refills: 2 | Status: SHIPPED | OUTPATIENT
Start: 2017-03-01 | End: 2017-05-24 | Stop reason: SDUPTHER

## 2017-03-01 RX ORDER — KETOROLAC TROMETHAMINE 5 MG/ML
SOLUTION OPHTHALMIC
COMMUNITY
Start: 2017-02-08 | End: 2018-02-16

## 2017-03-01 RX ADMIN — TRIAMCINOLONE ACETONIDE 40 MG: 40 INJECTION, SUSPENSION INTRA-ARTICULAR; INTRAMUSCULAR at 09:03

## 2017-03-01 NOTE — TELEPHONE ENCOUNTER
Patient's prescriptions were printed and signed by Dr. Nunes prior to the patient's clinic appointment.

## 2017-03-01 NOTE — MR AVS SNAPSHOT
[facility]  [address]  [place] [address]  Phone: [phone]  Fax: [phone]                  John Laguna   3/1/2017 7:40 AM   Office Visit    Description:  Male : 1955   Provider:  Ariana Aguilar PA-C   Department:  [facility]           Reason for Visit     Follow-up                To Do List           Future Appointments        Provider Department Dept Phone    2017 8:00 AM Ariana Aguilar PA-C [facility] [phone]      Goals (5 Years of Data)     None      [facility]     [facility] -  Assistance  Registered nurses in [facility] provide clinical advisement, health education, appointment booking, and other advisory services.  Call for this free service at [phone].             Medications           Message regarding Medications     Verify the changes and/or additions to your medication regime listed below are the same as discussed with your clinician today.  If any of these changes or additions are incorrect, please notify your healthcare provider.             Verify that the below list of medications is an accurate representation of the medications you are currently taking.  If none reported, the list may be blank. If incorrect, please contact your healthcare provider. Carry this list with you in case of emergency.           Current Medications     acetaminophen (TYLENOL) 650 MG TbSR Take 650 mg by mouth every 8 (eight) hours.    albuterol 90 mcg/actuation inhaler Inhale 2 puffs into the lungs every 4 (four) hours as needed for Wheezing.    amlodipine (NORVASC) 5 MG tablet TAKE 1 TABLET BY MOUTH EVERY DAY    amlodipine (NORVASC) 5 MG tablet TAKE 1 TABLET BY MOUTH DAILY    atorvastatin (LIPITOR) 40 MG tablet TAKE 1 TABLET BY MOUTH ONCE DAILY    butalbital-acetaminophen-caffeine -40 mg (FIORICET, ESGIC) -40 mg per tablet TAKE 1 TABLET  BY MOUTH EVERY 4 HOURS AS NEEDED FOR PAIN    cyclobenzaprine (FLEXERIL) 10 MG tablet TAKE 1/2 TO 1 TABLET BY MOUTH EVERY NIGHT AT BEDTIME AS NEEDED FOR SLEEP    diclofenac sodium 1 % Gel Apply 2 g topically 3 (three) times daily. To the knee for pain    gabapentin (NEURONTIN) 300 MG capsule TAKE 1 CAPSULE(300 MG) BY MOUTH THREE TIMES DAILY    hydrocodone-acetaminophen 5-325mg (NORCO) 5-325 mg per tablet Starting on Apr 01, 2017. Take 1 tablet by mouth 3 (three) times daily as needed for Pain.    hydrocodone-acetaminophen 5-325mg (NORCO) 5-325 mg per tablet Starting on Apr 30, 2017. Take 1 tablet by mouth 3 (three) times daily as needed for Pain.    ketorolac 0.5% (ACULAR) 0.5 % Drop     meloxicam (MOBIC) 15 MG tablet TAKE 1 TABLET(15 MG) BY MOUTH EVERY DAY    omeprazole (PRILOSEC) 20 MG capsule Take 1 capsule (20 mg total) by mouth once daily.    prednisoLONE acetate (PRED FORTE) 1 % DrpS     topiramate (TOPAMAX) 50 MG tablet Take 1 tablet (50 mg total) by mouth 2 (two) times daily.           Clinical Reference Information           Your Vitals Were     BP                   136/76           Blood Pressure          Most Recent Value    BP  136/76      Allergies as of 3/1/2017     Morphine      Immunizations Administered on Date of Encounter - 3/1/2017     None      Smoking Cessation     If you would like to quit smoking:   You may be eligible for free services if you are a [place] resident and started smoking cigarettes before September 1, 1988.  Call the Smoking Cessation Trust ([facility]) toll free at [phone] or [phone].   Call 1-800-QUIT-NOW if you do not meet the above criteria.            Language Assistance Services     ATTENTION: Language assistance services are available, free of charge. Please call [phone].      ATENCIÓN: Si zariala sydnee, tiene a thurston disposición servicios gratuitos de asistencia lingüística. Llame al [phone].     CHÚ Ý: N?u b?n nói Ti?ng Vi?t, có các d?ch v? h? tr?  samreen farrar? mi?n phí dành cho b?n. G?i s? [phone].         [facility] complies with applicable Federal civil rights laws and does not discriminate on the basis of race, color, national origin, age, disability, or sex.

## 2017-03-01 NOTE — PROGRESS NOTES
Chief Pain Complaint:  Neck Pain, Bilateral Arm Pain, Shoulder Pain     History of Present Illness:  This patient is a 61 y.o. male who presents today complaining of the above noted pain/s. The patient describes this pain as follows.    - duration of pain: pain for years   - timing: constant   - character: sharp, aching  - radiating, dermatomal: extends to the shoulders b/l, nondermatomal  - antecedent trauma, prior spinal surgery: no prior trauma, no prior spinal surgery, bilateral shoulder surgery  - pertinent negatives: No fever, No chills, No weight loss, No bladder dysfunction, No bowel dysfunction, No saddle anesthesia  - pertinent positives: generalized nonspecific Upper Extremity weakness bilaterally    - medications, other therapies tried (physical therapy, injections):     >> Norco, flexeril, gabapentin, Mobic, Topamax, Fioricet    >> Has previously undergone Physical Therapy - which he does not find helpful but does exercises at home    >> Has previously undergone spinal injection/s      IMAGING / Labs / Studies (reviewed on 3/1/2017):    5-12-16 XR Lumbar and Thoracic:  Findings: There is slight levoscoliotic curvature of the lumbar spine.  There is grade 2 anterolisthesis of L5 on S1 with minimal grade 1 anterolisthesis of L4 on L5 and slight retrolisthesis of L3 on L4.  Moderate to severe disc height loss present at L5-S1 with mild disc height loss at L3-L4 and L4-L5. Bilateral facet arthropathy present at L4-L5 and L5-S1. No acute fractures visualized.  The remaining visualized osseous and soft tissue structures demonstrate no appreciable abnormality.    Findings: There is mild dextroscoliotic curvature of the thoracic spine.  There are multiple mild age-indeterminate compression deformities involving the mid and lower thoracic spine with suspected involvement of T6-T11.  There is mild multilevel disc height loss noted throughout the thoracic spine with associated disc osteophyte complex production.  Posterior elements appear grossly intact.   The remaining visualized osseous and soft tissue structures demonstrate no appreciable abnormality.       4/28/14 MRI Cervical Spine Without Contrast    Narrative Study:   Cervical spine MRI without contrast.04/28/14 11:09:00  Technique:  Multiplanar non contrast images obtained on the cervical spine.   History: Left neck pain.  Left shoulder pain.  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  No abnormal signal in the cord.  The craniocervical junction is normal.  C2-3: Facet hypertrophy on the left side with mild left sided neural foraminal narrowing  C3-4: Disk space narrowing.  Broad-based osteophyte disk foot.  Bilateral uncovertebral-joint disease with moderate severe neural frontal narrowing.  C4-5: Disk space narrowing.  Broad-based osteophyte disk foot.  Bilateral uncovertebral joint disease with mild to severe neural foraminal narrowing.  Decreased CSF spaces anterior-posterior to the cord.  C5-6: Broad-based osteophyte and disk bulge.  Bilateral uncovertebral joint disease with mild to moderate bilateral bony neural frontal narrowing greater on the left side.  Disk protrusion into the left lateral recess.  C6-7: Disk space narrowing.  Broad-based osteophyte and disk bulge bilateral uncovertebral joint disease with mild neural foraminal narrowing.  C7-T1: Mild to space narrowing.  Mild uncovertebral joint disease.  No significant neural frontal narrowing.         Review of Systems:  CONSTITUTIONAL: patient denies any fever, chills, or weight loss  SKIN: patient denies any rash or itching  RESPIRATORY: patient denies having any shortness of breath  GASTROINTESTINAL: patient denies having any diarrhea, constipation, or bowel incontinence  GENITOURINARY: patient denies having any abnormal bladder function    MUSCULOSKELETAL:  - patient complains of the above noted pain/s (see chief pain complaint)    NEUROLOGICAL:   - pain as above  - strength in Upper  "extremities is decreased, BILATERALLY  - sensation in Upper extremities is abnormal, BILATERALLY  - patient denies any loss of bowel or bladder control      PSYCHIATRIC: patient denies any change in mood      Physical Exam:    Vitals:  /76  Pulse 75  Ht 5' 11" (1.803 m)  Wt 92.1 kg (203 lb)  BMI 28.31 kg/m2   (reviewed on 3/1/2017)    General: alert and oriented  Gait: normal gait  Skin: No rashes, No discoloration, No obvious lesions  HEENT: EOMI  Respiratory: respirations nonlabored    Musculoskeletal:  - Any pain on flexion, extension, rotation:    >> pain on flexion, extension, and rotation of cervical spine    >> facet loading causes pain, L>R  - Spurling's Test: causes No radiating pain  - Any tenderness to palpation across paraspinal muscles, joints, bursae:     >> tender to palpation across cervical paraspinals, worse on the left    >> TTP over bilateral hand joints, worse on right hand    Neuro:  - Extremity Strength:     >> LEFT :: 5/5    >> RIGHT :: 5/5  - Extremity Reflexes:    >> LEFT  :: 1+    >> RIGHT :: 1+  - Sensory (sensation to light touch):    >> LEFT :: intact    >> RIGHT :: intact  - Luciano's sign:     >> LEFT :: negative    >> RIGHT :: negative      Psych:  Mood and affect is appropriate      Assessment:  Cervical Spondylosis  Myofascial Pain  Cervical DDD      Plan:  Patient returns for follow-up. He has widespread joint pain, including cervical facet pain along with myofascial pain.   - He had trigger point injections with Dr. Nnues in September and with me on 11-29-16. He is reporting the pain has returned. I will give him cervical trigger point injections today.  - Will refill gabapentin 300mg TID and Norco 5mg TID PRN x 3 months, which he reports is helping his pain.  - We can consider adding an antidepressant, possibly Cymbalta, moving forward.   - LA  reviewed and appropriate. He last filled on 2-3-17.  - Will order UDS next visit to ensure compliance. Last UDS was " appropriate.  RTC in 3 months for med refill. I discussed the risks, benefits, and alternatives to potential treatment options. All questions and concerns were fully addressed today in clinic. Dr. Nunes was consulted regarding the patient plan and agrees.            >> Pain Disability Questionnaire:  8-11-16 :: 74  9-6-16 :: 120  11-8-16 :: 54  1/3/2017 :: 129    >>UDS:  8-11-16 :: appropriate (+hydrocodone metabolites, +barbituates)  1/3/2017 :: appropriate    >>Opioid Risk Tool:  1/3/2017 :: 6    >> Pain Disability Index:  3/1/2017 :: 59          Procedures   Procedure: Trigger point injection      Muscle/s injected: Cervical Paraspinals, Trapezius muscle, Supraspinatus      Side: Bilateral      Description of Procedure:  Prior to starting this procedure, risks, benefits, complications, and alternatives were discussed with the patient. The patient agreed to proceed with the procedure. The procedural sites were identified, marked, and widely prepped with ChloraPrep.       A 25-gauge 1.5 inch needle was introduced into the above noted muscle/s. Following negative aspiration, a volume of 1 to 1.5 mL of a solution comprised of 9 mL of 2% Lidocaine, and 1 mL of Triamcinolone (40 mg/mL) was injected. No pain or paresthesia was noted on injection. This process was repeated at multiple sites throughout the above noted muscle/s until the above noted solution was exhausted. The patient tolerated the procedure well. The injection sites were cleaned and bandaged as needed.      Complications: None

## 2017-03-07 DIAGNOSIS — E78.5 HYPERLIPIDEMIA: ICD-10-CM

## 2017-03-07 RX ORDER — ATORVASTATIN CALCIUM 40 MG/1
TABLET, FILM COATED ORAL
Qty: 30 TABLET | Refills: 2 | Status: SHIPPED | OUTPATIENT
Start: 2017-03-07 | End: 2017-05-24 | Stop reason: SDUPTHER

## 2017-03-09 ENCOUNTER — OFFICE VISIT (OUTPATIENT)
Dept: FAMILY MEDICINE | Facility: CLINIC | Age: 62
End: 2017-03-09
Payer: MEDICAID

## 2017-03-09 VITALS
BODY MASS INDEX: 28.7 KG/M2 | TEMPERATURE: 96 F | OXYGEN SATURATION: 97 % | DIASTOLIC BLOOD PRESSURE: 78 MMHG | SYSTOLIC BLOOD PRESSURE: 138 MMHG | WEIGHT: 205 LBS | HEIGHT: 71 IN | HEART RATE: 73 BPM

## 2017-03-09 DIAGNOSIS — I10 ESSENTIAL HYPERTENSION: ICD-10-CM

## 2017-03-09 DIAGNOSIS — J06.9 UPPER RESPIRATORY TRACT INFECTION, UNSPECIFIED TYPE: Primary | ICD-10-CM

## 2017-03-09 PROCEDURE — 99213 OFFICE O/P EST LOW 20 MIN: CPT | Mod: S$PBB,,, | Performed by: FAMILY MEDICINE

## 2017-03-09 PROCEDURE — 99213 OFFICE O/P EST LOW 20 MIN: CPT | Mod: PBBFAC,PO | Performed by: FAMILY MEDICINE

## 2017-03-09 PROCEDURE — 99999 PR PBB SHADOW E&M-EST. PATIENT-LVL III: CPT | Mod: PBBFAC,,, | Performed by: FAMILY MEDICINE

## 2017-03-09 RX ORDER — METHYLPREDNISOLONE 4 MG/1
TABLET ORAL
Qty: 1 PACKAGE | Refills: 0 | Status: SHIPPED | OUTPATIENT
Start: 2017-03-09 | End: 2017-05-24 | Stop reason: ALTCHOICE

## 2017-03-09 NOTE — PATIENT INSTRUCTIONS
If you need to take a decongestant over the counter, use CORICIDIN only. Do not use DECONGESTANT SPRAYS.   But DO use your Flonase for a few days now. Continue to use your inhaler if you need to

## 2017-03-09 NOTE — MR AVS SNAPSHOT
[facility]  [address]  [place] [address]  Phone: [phone]  Fax: [phone]                  John Laguna   3/9/2017 7:20 AM   Office Visit    Description:  Male : 1955   Provider:  Toro Mix MD   Department:  [facility]           Reason for Visit     Cough     Sore Throat     Nasal Congestion           Diagnoses this Visit        Comments    Upper respiratory tract infection, unspecified type    -  Primary     Essential hypertension                To Do List           Future Appointments        Provider Department Dept Phone    2017 7:20 AM Ariana Aguilar PA-C [facility] [phone]      Goals (5 Years of Data)     None      Follow-Up and Disposition     Return if symptoms worsen or fail to improve.    Follow-up and Disposition History       These Medications        Disp Refills Start End    methylPREDNISolone (MEDROL DOSEPACK) 4 mg tablet 1 Package 0 3/9/2017     use as directed    [facility] [address] [place] [address] Ph #: [phone]         [facility] On Call     [facility] On Call Nurse Care Line -  Assistance  Registered nurses in the [facility] On Call Center provide clinical advisement, health education, appointment booking, and other advisory services.  Call for this free service at [phone].             Medications           Message regarding Medications     Verify the changes and/or additions to your medication regime listed below are the same as discussed with your clinician today.  If any of these changes or additions are incorrect, please notify your healthcare provider.        START taking these NEW medications        Refills    methylPREDNISolone (MEDROL DOSEPACK) 4 mg tablet 0    Sig: use as directed    Class: Normal           Verify that the below list of medications is an accurate representation of the medications  "you are currently taking.  If none reported, the list may be blank. If incorrect, please contact your healthcare provider. Carry this list with you in case of emergency.           Current Medications     acetaminophen (TYLENOL) 650 MG TbSR Take 650 mg by mouth every 8 (eight) hours.    albuterol 90 mcg/actuation inhaler Inhale 2 puffs into the lungs every 4 (four) hours as needed for Wheezing.    amlodipine (NORVASC) 5 MG tablet TAKE 1 TABLET BY MOUTH EVERY DAY    amlodipine (NORVASC) 5 MG tablet TAKE 1 TABLET BY MOUTH DAILY    atorvastatin (LIPITOR) 40 MG tablet TAKE 1 TABLET BY MOUTH EVERY DAY    butalbital-acetaminophen-caffeine -40 mg (FIORICET, ESGIC) -40 mg per tablet TAKE 1 TABLET BY MOUTH EVERY 4 HOURS AS NEEDED FOR PAIN    cyclobenzaprine (FLEXERIL) 10 MG tablet TAKE 1/2 TO 1 TABLET BY MOUTH EVERY NIGHT AT BEDTIME AS NEEDED FOR SLEEP    diclofenac sodium 1 % Gel Apply 2 g topically 3 (three) times daily. To the knee for pain    gabapentin (NEURONTIN) 300 MG capsule Take 1 capsule (300 mg total) by mouth 3 (three) times daily.    hydrocodone-acetaminophen 5-325mg (NORCO) 5-325 mg per tablet Starting on Apr 01, 2017. Take 1 tablet by mouth 3 (three) times daily as needed for Pain.    hydrocodone-acetaminophen 5-325mg (NORCO) 5-325 mg per tablet Starting on Apr 30, 2017. Take 1 tablet by mouth 3 (three) times daily as needed for Pain.    ketorolac 0.5% (ACULAR) 0.5 % Drop     meloxicam (MOBIC) 15 MG tablet TAKE 1 TABLET(15 MG) BY MOUTH EVERY DAY    methylPREDNISolone (MEDROL DOSEPACK) 4 mg tablet use as directed    omeprazole (PRILOSEC) 20 MG capsule Take 1 capsule (20 mg total) by mouth once daily.    prednisoLONE acetate (PRED FORTE) 1 % DrpS     topiramate (TOPAMAX) 50 MG tablet Take 1 tablet (50 mg total) by mouth 2 (two) times daily.           Clinical Reference Information           Your Vitals Were     BP Pulse Temp Height Weight SpO2    138/78 73 95.9 °F (35.5 °C) (Tympanic) 5' 11" (1.803 " m) 93 kg (205 lb) 97%    BMI                28.59 kg/m2          Blood Pressure          Most Recent Value    BP  138/78      Allergies as of 3/9/2017     Morphine      Immunizations Administered on Date of Encounter - 3/9/2017     None      Instructions    If you need to take a decongestant over the counter, use CORICIDIN only. Do not use DECONGESTANT SPRAYS.   But DO use your Flonase for a few days now. Continue to use your inhaler if you need to        Smoking Cessation     If you would like to quit smoking:   You may be eligible for free services if you are a [place] resident and started smoking cigarettes before September 1, 1988.  Call the Smoking Cessation [employer] ([facility]) toll free at (718) 310-7682 or (231) 565-0223.   Call 1-800-QUIT-NOW if you do not meet the above criteria.            Language Assistance Services     ATTENTION: Language assistance services are available, free of charge. Please call [phone].      ATENCIÓN: Si habla español, tiene a thurston disposición servicios gratuitos de asistencia lingüística. Llame al [phone].     [facility] Ý: N?u b?n nói Ti?ng Vi?t, có các d?ch v? h? tr? ngôn ng? mi?n phí dành cho b?n. G?i s? [phone].         [facility] complies with applicable Federal civil rights laws and does not discriminate on the basis of race, color, national origin, age, disability, or sex.

## 2017-03-09 NOTE — PROGRESS NOTES
Subjective:       Patient ID: John Laguna is a 61 y.o. male.    Chief Complaint: Cough (for 2 days); Sore Throat; and Nasal Congestion    HPI Comments: Pt takes albuterol inhaler prn. Concerned that yesterday his BP was high. Turns out he use an over the counter nasal spray that sounds like it may have been a decongestant.  On daily meds for migraine prophylaxis and HTN    Cough   This is a new problem. The current episode started in the past 7 days. The problem has been gradually worsening. The problem occurs every few minutes. The cough is productive of sputum. Associated symptoms include nasal congestion. Pertinent negatives include no chest pain, chills, ear congestion, ear pain, eye redness, fever, headaches, hemoptysis, myalgias, postnasal drip, rash, rhinorrhea, sore throat, shortness of breath, sweats or wheezing. The symptoms are aggravated by pollens. Risk factors for lung disease include smoking/tobacco exposure. Treatments tried: OTC nasal decongestant spray. The treatment provided mild relief. His past medical history is significant for bronchitis, COPD and pneumonia. There is no history of environmental allergies.     Review of Systems   Constitutional: Negative for activity change, appetite change, chills, fatigue, fever and unexpected weight change.   HENT: Negative for congestion, ear pain, postnasal drip, rhinorrhea, sinus pressure, sneezing and sore throat.    Eyes: Negative for pain, redness and visual disturbance.   Respiratory: Positive for cough. Negative for hemoptysis, chest tightness, shortness of breath and wheezing.    Cardiovascular: Negative for chest pain, palpitations and leg swelling.   Gastrointestinal: Negative for abdominal distention, abdominal pain, blood in stool, constipation, diarrhea, nausea and vomiting.   Endocrine: Negative for cold intolerance, heat intolerance, polydipsia and polyuria.   Genitourinary: Negative for difficulty urinating, dysuria, frequency,  hematuria and urgency.   Musculoskeletal: Negative for back pain, gait problem, myalgias and neck pain.   Skin: Negative for rash.   Allergic/Immunologic: Negative for environmental allergies and food allergies.   Neurological: Negative for dizziness, weakness, light-headedness and headaches.   Hematological: Negative for adenopathy.   Psychiatric/Behavioral: Negative for behavioral problems, dysphoric mood and sleep disturbance.       Objective:      Physical Exam   Constitutional: He is oriented to person, place, and time. He appears well-developed and well-nourished. No distress.   HENT:   Head: Normocephalic.   Right Ear: External ear normal.   Left Ear: External ear normal.   Nose: Nose normal.   Mouth/Throat: Oropharynx is clear and moist.   Eyes: Pupils are equal, round, and reactive to light.   Neck: Normal range of motion. Neck supple. No thyromegaly present.   Cardiovascular: Normal rate, regular rhythm and normal heart sounds.  Exam reveals no gallop and no friction rub.    No murmur heard.  Pulmonary/Chest: Effort normal and breath sounds normal. No respiratory distress. He has no wheezes. He has no rales. He exhibits no tenderness.   Abdominal: Soft. Bowel sounds are normal. He exhibits no distension and no mass. There is no tenderness. There is no rebound and no guarding.   Musculoskeletal: He exhibits no edema.   Lymphadenopathy:     He has no cervical adenopathy.   Neurological: He is alert and oriented to person, place, and time.   Skin: Skin is warm and dry. No rash noted. He is not diaphoretic.   Psychiatric: He has a normal mood and affect. His behavior is normal. Judgment and thought content normal.   Nursing note and vitals reviewed.      Assessment:       1. Upper respiratory tract infection, unspecified type    2. Essential hypertension        Plan:   Upper respiratory tract infection, unspecified type  -     methylPREDNISolone (MEDROL DOSEPACK) 4 mg tablet; use as directed  Dispense: 1  Package; Refill: 0    Essential hypertension  Stable. Discussed avoidable OTC cold meds.

## 2017-03-14 ENCOUNTER — OFFICE VISIT (OUTPATIENT)
Dept: URGENT CARE | Facility: CLINIC | Age: 62
End: 2017-03-14
Payer: MEDICAID

## 2017-03-14 VITALS
DIASTOLIC BLOOD PRESSURE: 78 MMHG | BODY MASS INDEX: 27.5 KG/M2 | HEIGHT: 72 IN | HEART RATE: 64 BPM | SYSTOLIC BLOOD PRESSURE: 126 MMHG | OXYGEN SATURATION: 97 % | TEMPERATURE: 97 F | WEIGHT: 203.06 LBS

## 2017-03-14 DIAGNOSIS — J01.01 ACUTE RECURRENT MAXILLARY SINUSITIS: ICD-10-CM

## 2017-03-14 DIAGNOSIS — Z72.0 TOBACCO USE: ICD-10-CM

## 2017-03-14 DIAGNOSIS — J20.9 ACUTE BRONCHITIS, UNSPECIFIED ORGANISM: ICD-10-CM

## 2017-03-14 DIAGNOSIS — I10 ESSENTIAL HYPERTENSION: ICD-10-CM

## 2017-03-14 DIAGNOSIS — J02.9 SORE THROAT: Primary | ICD-10-CM

## 2017-03-14 LAB
CTP QC/QA: YES
S PYO RRNA THROAT QL PROBE: NEGATIVE

## 2017-03-14 PROCEDURE — 99215 OFFICE O/P EST HI 40 MIN: CPT | Mod: PBBFAC,PO | Performed by: NURSE PRACTITIONER

## 2017-03-14 PROCEDURE — 87081 CULTURE SCREEN ONLY: CPT

## 2017-03-14 PROCEDURE — 99999 PR PBB SHADOW E&M-EST. PATIENT-LVL V: CPT | Mod: PBBFAC,,, | Performed by: NURSE PRACTITIONER

## 2017-03-14 PROCEDURE — 94640 AIRWAY INHALATION TREATMENT: CPT | Mod: PBBFAC,PO

## 2017-03-14 PROCEDURE — 99213 OFFICE O/P EST LOW 20 MIN: CPT | Mod: 25,S$PBB,, | Performed by: NURSE PRACTITIONER

## 2017-03-14 RX ORDER — BENZONATATE 200 MG/1
200 CAPSULE ORAL 3 TIMES DAILY PRN
Qty: 60 CAPSULE | Refills: 1 | Status: SHIPPED | OUTPATIENT
Start: 2017-03-14 | End: 2017-03-24

## 2017-03-14 RX ORDER — DOXYCYCLINE 100 MG/1
100 CAPSULE ORAL 2 TIMES DAILY
Qty: 14 CAPSULE | Refills: 0 | Status: SHIPPED | OUTPATIENT
Start: 2017-03-14 | End: 2017-03-21

## 2017-03-14 RX ORDER — FLUTICASONE PROPIONATE 50 MCG
2 SPRAY, SUSPENSION (ML) NASAL DAILY
Qty: 9.9 G | Refills: 0 | Status: SHIPPED | OUTPATIENT
Start: 2017-03-14 | End: 2018-08-14 | Stop reason: SDUPTHER

## 2017-03-14 RX ORDER — LEVALBUTEROL INHALATION SOLUTION 1.25 MG/3ML
1.25 SOLUTION RESPIRATORY (INHALATION)
Status: COMPLETED | OUTPATIENT
Start: 2017-03-14 | End: 2017-03-14

## 2017-03-14 RX ADMIN — LEVALBUTEROL HYDROCHLORIDE 1.25 MG: 1.25 SOLUTION RESPIRATORY (INHALATION) at 09:03

## 2017-03-14 NOTE — PATIENT INSTRUCTIONS
PLAN: Lab work POCT rapid strep screen, C&S  Advise increase p.o. fluids-- of water/juice & rest  Meds: Doxycycline, Tessalon Perles, Flonase  Simply saline nasal wash to irrigate sinuses and for congestion/runny nose.  Cool mist humidifier/vaporizer.  Practice good handwashing.  Advise OTC Mucinex  Discussed smoking cessation  Tylenol  for fever, headache and body aches.  Warm salt water gargles for throat comfort.  Chloraseptic spray or lozenges for throat comfort.  See PCP or go to ER if symptoms worsen or fail to improve with treatment.

## 2017-03-14 NOTE — MR AVS SNAPSHOT
[facility]  [address]  [facility] [address]  Phone: [phone]  Fax: [phone]                  John Laguna   3/14/2017 8:20 AM   Office Visit    Description:  Male : 1955   Provider:  Crissy Curran NP   Department:  [facility]           Reason for Visit     Cough           Diagnoses this Visit        Comments    Sore throat    -  Primary     Acute bronchitis, unspecified organism         Acute recurrent maxillary sinusitis                To Do List           Future Appointments        Provider Department Dept Phone    2017 7:20 AM ROBERT Cardenas - Interventional Pain [phone]      Goals (5 Years of Data)     None       These Medications        Disp Refills Start End    doxycycline (MONODOX) 100 MG capsule 14 capsule 0 3/14/2017 3/21/2017    Take 1 capsule (100 mg total) by mouth 2 (two) times daily. - Oral    Pharmacy: [facility] [address]  AT [place] #: [phone]       benzonatate (TESSALON) 200 MG capsule 60 capsule 1 3/14/2017 3/24/2017    Take 1 capsule (200 mg total) by mouth 3 (three) times daily as needed. - Oral    Pharmacy: [facility] [address] [place] [address] AVE  [place] #: [phone]       fluticasone (FLONASE) 50 mcg/actuation nasal spray 9.9 g 0 3/14/2017     2 sprays by Each Nare route once daily. - Each Nare    Pharmacy: [facility] [address]  AT [place] #: [phone]         [facility]     [facility] Care Line -  Assistance  Registered nurses in the [facility] provide clinical advisement, health education, appointment booking, and other advisory services.  Call for this free service at [phone].             Medications           Message regarding Medications     Verify the changes  and/or additions to your medication regime listed below are the same as discussed with your clinician today.  If any of these changes or additions are incorrect, please notify your healthcare provider.        START taking these NEW medications        Refills    doxycycline (MONODOX) 100 MG capsule 0    Sig: Take 1 capsule (100 mg total) by mouth 2 (two) times daily.    Class: Normal    Route: Oral    benzonatate (TESSALON) 200 MG capsule 1    Sig: Take 1 capsule (200 mg total) by mouth 3 (three) times daily as needed.    Class: Normal    Route: Oral    fluticasone (FLONASE) 50 mcg/actuation nasal spray 0    Si sprays by Each Nare route once daily.    Class: Normal    Route: Each Nare      These medications were administered today        Dose Freq    levalbuterol nebulizer solution 1.25 mg 1.25 mg [facility] 1 time    Sig: Take 3 mLs (1.25 mg total) by nebulization one time.    Class: Normal    Route: Nebulization           Verify that the below list of medications is an accurate representation of the medications you are currently taking.  If none reported, the list may be blank. If incorrect, please contact your healthcare provider. Carry this list with you in case of emergency.           Current Medications     acetaminophen (TYLENOL) 650 MG TbSR Take 650 mg by mouth every 8 (eight) hours.    albuterol 90 mcg/actuation inhaler Inhale 2 puffs into the lungs every 4 (four) hours as needed for Wheezing.    amlodipine (NORVASC) 5 MG tablet TAKE 1 TABLET BY MOUTH EVERY DAY    amlodipine (NORVASC) 5 MG tablet TAKE 1 TABLET BY MOUTH DAILY    atorvastatin (LIPITOR) 40 MG tablet TAKE 1 TABLET BY MOUTH EVERY DAY    benzonatate (TESSALON) 200 MG capsule Take 1 capsule (200 mg total) by mouth 3 (three) times daily as needed.    butalbital-acetaminophen-caffeine -40 mg (FIORICET, ESGIC) -40 mg per tablet TAKE 1 TABLET BY MOUTH EVERY 4 HOURS AS NEEDED FOR PAIN    cyclobenzaprine (FLEXERIL) 10 MG tablet TAKE 1/2  TO 1 TABLET BY MOUTH EVERY NIGHT AT BEDTIME AS NEEDED FOR SLEEP    diclofenac sodium 1 % Gel Apply 2 g topically 3 (three) times daily. To the knee for pain    doxycycline (MONODOX) 100 MG capsule Take 1 capsule (100 mg total) by mouth 2 (two) times daily.    fluticasone (FLONASE) 50 mcg/actuation nasal spray 2 sprays by Each Nare route once daily.    gabapentin (NEURONTIN) 300 MG capsule Take 1 capsule (300 mg total) by mouth 3 (three) times daily.    hydrocodone-acetaminophen 5-325mg (NORCO) 5-325 mg per tablet Starting on Apr 01, 2017. Take 1 tablet by mouth 3 (three) times daily as needed for Pain.    hydrocodone-acetaminophen 5-325mg (NORCO) 5-325 mg per tablet Starting on Apr 30, 2017. Take 1 tablet by mouth 3 (three) times daily as needed for Pain.    ketorolac 0.5% (ACULAR) 0.5 % Drop     meloxicam (MOBIC) 15 MG tablet TAKE 1 TABLET(15 MG) BY MOUTH EVERY DAY    methylPREDNISolone (MEDROL DOSEPACK) 4 mg tablet use as directed    omeprazole (PRILOSEC) 20 MG capsule Take 1 capsule (20 mg total) by mouth once daily.    prednisoLONE acetate (PRED FORTE) 1 % DrpS     topiramate (TOPAMAX) 50 MG tablet Take 1 tablet (50 mg total) by mouth 2 (two) times daily.           Clinical Reference Information           Your Vitals Were     BP Pulse Temp Height    126/78 (BP Location: Left arm, Patient Position: Sitting, BP Method: Manual) 64 97.3 °F (36.3 °C) (Tympanic) 6' (1.829 m)    Weight SpO2 BMI    92.1 kg (203 lb 0.7 oz) 97% 27.54 kg/m2      Blood Pressure          Most Recent Value    BP  126/78      Allergies as of 3/14/2017     Morphine      Immunizations Administered on Date of Encounter - 3/14/2017     None      Orders Placed During Today's Visit      Normal Orders This Visit    POCT Rapid Strep A          3/14/2017  9:13 AM - Janny Quigley LPN      Component Results     Component Value Flag Ref Range Units Status    Rapid Strep A Screen Negative  Negative  Final     Acceptable Yes    Final             Administrations This Visit     levalbuterol nebulizer solution 1.25 mg     Admin Date Action Dose Route Administered By             03/14/2017 Given 1.25 mg Nebulization Janny Quigley LPN                      Instructions    PLAN: Lab work POCT rapid strep screen, C&S  Advise increase p.o. fluids-- of water/juice & rest  Meds: Doxycycline, Tessalon Perles, Flonase  Simply saline nasal wash to irrigate sinuses and for congestion/runny nose.  Cool mist humidifier/vaporizer.  Practice good handwashing.  Advise OTC Mucinex  Tylenol  for fever, headache and body aches.  Warm salt water gargles for throat comfort.  Chloraseptic spray or lozenges for throat comfort.  See PCP or go to ER if symptoms worsen or fail to improve with treatment.            Smoking Cessation     If you would like to quit smoking:   You may be eligible for free services if you are a [place] resident and started smoking cigarettes before September 1, 1988.  Call the Smoking Cessation [employer] ([facility]) toll free at ([phone] [phone] or ([phone] [phone].   Call 1-800-QUIT-NOW if you do not meet the above criteria.            Language Assistance Services     ATTENTION: Language assistance services are available, free of charge. Please call [phone].      ATENCIÓN: Si habla español, tiene a thurston disposición servicios gratuitos de asistencia lingüística. Llame al [phone].     CHÚ Ý: N?u b?n nói Ti?ng Vi?t, có các d?ch v? h? tr? ngôn ng? mi?n phí dành cho b?n. G?i s? [phone].         [facility] complies with applicable Federal civil rights laws and does not discriminate on the basis of race, color, national origin, age, disability, or sex.

## 2017-03-14 NOTE — PROGRESS NOTES
CHIEF COMPLAINT/REASON FOR VISIT: Sore throat     HISTORY OF PRESENT ILLNESS:  61   year-old male complains of sore  throat,  runny nose, nasal congestion, post nasal drip with blood tinge mucus, headache onset 1 week ago. Admits seen & treated in clinic with a diagnosis of URI. Patient admits tried  medications with no relief. Patient admits uses inhalers as prescribed! Admits still smoking. Discussed smoking cessation.    Past Medical History:   Diagnosis Date    Arthritis     Brain aneurysm     Cancer 2012    PROSTATE    COPD (chronic obstructive pulmonary disease)     Headache(784.0)     Hypertension     ON MEDS     Past Surgical History:   Procedure Laterality Date    BRAIN SURGERY  2012    AV shunt    JOINT REPLACEMENT  2001    left knee    PROSTATE SURGERY      ROTATOR CUFF REPAIR  2003         Social History     Social History    Marital status: Single     Spouse name: N/A    Number of children: N/A    Years of education: N/A     Occupational History    retired/disabled      Social History Main Topics    Smoking status: Current Every Day Smoker     Packs/day: 0.25     Years: 40.00     Types: Cigarettes    Smokeless tobacco: Never Used      Comment: instructed not to smoke after midnight    Alcohol use No    Drug use: No    Sexual activity: Not on file     Other Topics Concern    Not on file     Social History Narrative       ROS:  GENERAL: No fever, chills  SKIN: No rashes, itching or changes in color or texture of skin.   HEENT:  Reports sore  throat,  runny nose, nasal congestion, post nasal drip with blood tinge mucus, headache  NODES: No masses or lesions. Denies swollen glands.   CHEST: reports nonproductive cough with wheezing  CARDIOVASCULAR: reports slight shortness of breath. Denies chest pain.   ABDOMEN: Appetite fine. No weight loss. Denies diarrhea, abdominal pain.  MUSCULOSKELETAL: No joint stiffness or swelling. Denies back pain.  NEUROLOGIC: No history of seizures,  paralysis, alteration of gait or coordination.  PSYCHIATRIC: Denies mood swings, depression or suicidal thoughts.    PE:   APPEARANCE: Well nourished, well developed, in mild distress. Pulse ox: 97%, clearing throat   SKIN: Normal skin turgor, no lesions.  HEENT: Turbinates injected, minimal red pharynx, TM's with minimal effusions & poor light reflex bilateral,  facial tenderness.  CHEST: very minimal expiratory wheezing on auscultation.  CARDIOVASCULAR: Regular rate and rhythm.  NEUROLOGIC: No sensory deficits. Gait & Posture: Normal, No cerebellar signs.  MENTAL STATUS: Patient alert, oriented x 3 & conversant.    PLAN: Lab work POCT rapid strep screen, C&S  Advise increase p.o. fluids-- of water/juice & rest  Meds: Doxycycline, Tessalon Perles, Flonase  Simply saline nasal wash to irrigate sinuses and for congestion/runny nose.  Cool mist humidifier/vaporizer.  Practice good handwashing.  Advise OTC Mucinex  Discussed smoking cessation  Tylenol  for fever, headache and body aches.  Warm salt water gargles for throat comfort.  Chloraseptic spray or lozenges for throat comfort.  See PCP or go to ER if symptoms worsen or fail to improve with treatment.         DIAGNOSIS:  Sinusitis  Pharyngitis  Bronchitis  Hypertension  Tobacco use disorder

## 2017-03-17 LAB — BACTERIA THROAT CULT: NORMAL

## 2017-04-04 DIAGNOSIS — M19.019 SHOULDER ARTHRITIS: ICD-10-CM

## 2017-04-04 DIAGNOSIS — G89.4 CHRONIC PAIN SYNDROME: ICD-10-CM

## 2017-04-04 DIAGNOSIS — M54.2 NECK PAIN: ICD-10-CM

## 2017-04-04 RX ORDER — MELOXICAM 15 MG/1
TABLET ORAL
Qty: 30 TABLET | Refills: 0 | Status: SHIPPED | OUTPATIENT
Start: 2017-04-04 | End: 2017-05-24 | Stop reason: ALTCHOICE

## 2017-05-23 RX ORDER — HYDROCODONE BITARTRATE AND ACETAMINOPHEN 5; 325 MG/1; MG/1
1 TABLET ORAL 3 TIMES DAILY PRN
Qty: 90 TABLET | Refills: 0 | Status: SHIPPED | OUTPATIENT
Start: 2017-06-28 | End: 2017-07-28

## 2017-05-23 RX ORDER — HYDROCODONE BITARTRATE AND ACETAMINOPHEN 5; 325 MG/1; MG/1
1 TABLET ORAL 3 TIMES DAILY PRN
Qty: 90 TABLET | Refills: 0 | Status: SHIPPED | OUTPATIENT
Start: 2017-05-30 | End: 2017-05-23 | Stop reason: SDUPTHER

## 2017-05-23 RX ORDER — HYDROCODONE BITARTRATE AND ACETAMINOPHEN 5; 325 MG/1; MG/1
1 TABLET ORAL 3 TIMES DAILY PRN
Qty: 90 TABLET | Refills: 0 | Status: SHIPPED | OUTPATIENT
Start: 2017-07-27 | End: 2017-08-15 | Stop reason: SDUPTHER

## 2017-05-23 NOTE — PROGRESS NOTES
Chief Pain Complaint:  Neck Pain, Bilateral Arm Pain, Shoulder Pain     History of Present Illness:  This patient is a 62 y.o. male who presents today complaining of the above noted pain/s. The patient describes this pain as follows.    - duration of pain: pain for years   - timing: constant   - character: sharp, aching  - radiating, dermatomal: extends to the shoulders b/l, nondermatomal  - antecedent trauma, prior spinal surgery: no prior trauma, no prior spinal surgery, bilateral shoulder surgery  - pertinent negatives: No fever, No chills, No weight loss, No bladder dysfunction, No bowel dysfunction, No saddle anesthesia  - pertinent positives: generalized nonspecific Upper Extremity weakness bilaterally    - medications, other therapies tried (physical therapy, injections):     >> Norco, flexeril, gabapentin, Mobic, Topamax, Fioricet    >> Has previously undergone Physical Therapy - which he does not find helpful but does exercises at home    >> Has previously undergone spinal injection/s      IMAGING / Labs / Studies (reviewed on 5/23/2017):    5-12-16 XR Lumbar and Thoracic:  Findings: There is slight levoscoliotic curvature of the lumbar spine.  There is grade 2 anterolisthesis of L5 on S1 with minimal grade 1 anterolisthesis of L4 on L5 and slight retrolisthesis of L3 on L4.  Moderate to severe disc height loss present at L5-S1 with mild disc height loss at L3-L4 and L4-L5. Bilateral facet arthropathy present at L4-L5 and L5-S1. No acute fractures visualized.  The remaining visualized osseous and soft tissue structures demonstrate no appreciable abnormality.    Findings: There is mild dextroscoliotic curvature of the thoracic spine.  There are multiple mild age-indeterminate compression deformities involving the mid and lower thoracic spine with suspected involvement of T6-T11.  There is mild multilevel disc height loss noted throughout the thoracic spine with associated disc osteophyte complex production.  Posterior elements appear grossly intact.   The remaining visualized osseous and soft tissue structures demonstrate no appreciable abnormality.       4/28/14 MRI Cervical Spine Without Contrast    Narrative Study:   Cervical spine MRI without contrast.04/28/14 11:09:00  Technique:  Multiplanar non contrast images obtained on the cervical spine.   History: Left neck pain.  Left shoulder pain.  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  No abnormal signal in the cord.  The craniocervical junction is normal.  C2-3: Facet hypertrophy on the left side with mild left sided neural foraminal narrowing  C3-4: Disk space narrowing.  Broad-based osteophyte disk foot.  Bilateral uncovertebral-joint disease with moderate severe neural frontal narrowing.  C4-5: Disk space narrowing.  Broad-based osteophyte disk foot.  Bilateral uncovertebral joint disease with mild to severe neural foraminal narrowing.  Decreased CSF spaces anterior-posterior to the cord.  C5-6: Broad-based osteophyte and disk bulge.  Bilateral uncovertebral joint disease with mild to moderate bilateral bony neural frontal narrowing greater on the left side.  Disk protrusion into the left lateral recess.  C6-7: Disk space narrowing.  Broad-based osteophyte and disk bulge bilateral uncovertebral joint disease with mild neural foraminal narrowing.  C7-T1: Mild to space narrowing.  Mild uncovertebral joint disease.  No significant neural frontal narrowing.         Review of Systems:  CONSTITUTIONAL: patient denies any fever, chills, or weight loss  SKIN: patient denies any rash or itching  RESPIRATORY: patient denies having any shortness of breath  GASTROINTESTINAL: patient denies having any diarrhea, constipation, or bowel incontinence  GENITOURINARY: patient denies having any abnormal bladder function    MUSCULOSKELETAL:  - patient complains of the above noted pain/s (see chief pain complaint)    NEUROLOGICAL:   - pain as above  - strength in Upper  extremities is decreased, BILATERALLY  - sensation in Upper extremities is abnormal, BILATERALLY  - patient denies any loss of bowel or bladder control      PSYCHIATRIC: patient denies any change in mood      Physical Exam:    Vitals:  /73 (BP Location: Right arm, Patient Position: Sitting, BP Method: Automatic)   Pulse 70   Resp 18   Ht 6' (1.829 m)   Wt 92.1 kg (203 lb)   BMI 27.53 kg/m²    (reviewed on 5/23/2017)    General: alert and oriented  Gait: normal gait  Skin: No rashes, No discoloration, No obvious lesions  HEENT: EOMI  Respiratory: respirations nonlabored    Musculoskeletal :  - Any pain on flexion, extension, rotation:    >> pain on flexion, extension, and rotation of cervical spine    >> facet loading on left causes more pain than right  - Spurling's Test: causes No radiating pain  - Any tenderness to palpation across paraspinal muscles, joints, bursae:     >> tender to palpation across cervical paraspinals, L>R    >> TTP over bilateral hand joints    Neuro:  - Extremity Strength:     >> LEFT :: 5/5    >> RIGHT :: 5/5  - Extremity Reflexes:    >> LEFT  :: 1+    >> RIGHT :: 1+  - Sensory (sensation to light touch):    >> LEFT :: intact    >> RIGHT :: intact  - Luciano's sign:     >> LEFT :: negative    >> RIGHT :: negative      Psych:  Mood and affect is appropriate      Assessment:  Cervical Spondylosis  Myofascial Pain  Cervical DDD      Plan:  Patient returns for follow-up. He has widespread joint pain, including cervical facet pain along with myofascial pain.   - Will administer cervical trigger point injections today. Procedure note below.  - Will refill gabapentin 300mg TID and Norco 5mg TID PRN x 3 months, which he reports is helping his pain.  - Start diclofenac 25mg QD PRN pain. He denies any kidney, liver, or cardiac issues and has no history of gastric ulcers. He was informed to take with food, discontinue all other NSAIDs, and take for shortest amount of time possible.  - Order  no-NSAID compound cream for potential topical pain relief. Pt will be contacted for [facility] regarding cost and payment.   - LA  reviewed and appropriate. He last filled on 5-1-17.  - Will order UDS today to ensure medication compliance.   RTC in 3 months for med refill. I discussed the risks, benefits, and alternatives to potential treatment options. All questions and concerns were fully addressed today in clinic. Dr. Nunes was consulted regarding the patient plan and agrees.            >> Pain Disability Questionnaire:  8-11-16 :: 74  9-6-16 :: 120  11-8-16 :: 54  1/3/2017 :: 129    >> Pain Disability Index:  3/1/2017 :: 59  5/24/2017 :: 70    >>UDS:  8-11-16 :: appropriate (+hydrocodone metabolites, +barbituates)  1/3/2017 :: appropriate  5/24/2017 :: pending    >>Opioid Risk Tool:  1/3/2017 :: 6        Procedures:  Procedure: Trigger point injection      Muscle/s injected: Cervical Paraspinals, Trapezius muscle, Supraspinatus      Side: Bilateral      Description of Procedure:  Prior to starting this procedure, risks, benefits, complications, and alternatives were discussed with the patient. The patient agreed to proceed with the procedure. The procedural sites were identified, marked, and widely prepped with ChloraPrep.       A 25-gauge 1.5 inch needle was introduced into the above noted muscle/s. Following negative aspiration, a volume of 1 to 1.5 mL of a solution comprised of 9 mL of 2% Lidocaine, and 1 mL of Triamcinolone (40 mg/mL) was injected. No pain or paresthesia was noted on injection. This process was repeated at multiple sites throughout the above noted muscle/s until the above noted solution was exhausted. The patient tolerated the procedure well. The injection sites were cleaned and bandaged as needed.      Complications: None

## 2017-05-24 ENCOUNTER — OFFICE VISIT (OUTPATIENT)
Dept: PAIN MEDICINE | Facility: CLINIC | Age: 62
End: 2017-05-24
Payer: MEDICAID

## 2017-05-24 ENCOUNTER — OFFICE VISIT (OUTPATIENT)
Dept: INTERNAL MEDICINE | Facility: CLINIC | Age: 62
End: 2017-05-24
Payer: MEDICAID

## 2017-05-24 ENCOUNTER — LAB VISIT (OUTPATIENT)
Dept: LAB | Facility: HOSPITAL | Age: 62
End: 2017-05-24
Payer: MEDICAID

## 2017-05-24 VITALS
WEIGHT: 203 LBS | SYSTOLIC BLOOD PRESSURE: 130 MMHG | BODY MASS INDEX: 27.5 KG/M2 | DIASTOLIC BLOOD PRESSURE: 73 MMHG | HEIGHT: 72 IN | RESPIRATION RATE: 18 BRPM | HEART RATE: 70 BPM

## 2017-05-24 VITALS
WEIGHT: 213.88 LBS | BODY MASS INDEX: 28.97 KG/M2 | HEART RATE: 63 BPM | SYSTOLIC BLOOD PRESSURE: 138 MMHG | TEMPERATURE: 98 F | OXYGEN SATURATION: 96 % | HEIGHT: 72 IN | DIASTOLIC BLOOD PRESSURE: 68 MMHG

## 2017-05-24 DIAGNOSIS — M19.041 PRIMARY OSTEOARTHRITIS OF RIGHT HAND: ICD-10-CM

## 2017-05-24 DIAGNOSIS — M50.30 DDD (DEGENERATIVE DISC DISEASE), CERVICAL: ICD-10-CM

## 2017-05-24 DIAGNOSIS — M79.18 MYOFASCIAL PAIN: Primary | ICD-10-CM

## 2017-05-24 DIAGNOSIS — E78.5 HYPERLIPIDEMIA, UNSPECIFIED HYPERLIPIDEMIA TYPE: ICD-10-CM

## 2017-05-24 DIAGNOSIS — Z12.5 ENCOUNTER FOR PROSTATE CANCER SCREENING: ICD-10-CM

## 2017-05-24 DIAGNOSIS — R23.3 EASY BRUISING: ICD-10-CM

## 2017-05-24 DIAGNOSIS — M47.812 SPONDYLOSIS OF CERVICAL REGION WITHOUT MYELOPATHY OR RADICULOPATHY: ICD-10-CM

## 2017-05-24 DIAGNOSIS — M48.02 NEUROFORAMINAL STENOSIS OF CERVICAL SPINE: ICD-10-CM

## 2017-05-24 DIAGNOSIS — G89.4 CHRONIC PAIN DISORDER: ICD-10-CM

## 2017-05-24 DIAGNOSIS — R23.3 EASY BRUISING: Primary | ICD-10-CM

## 2017-05-24 LAB
ALBUMIN SERPL BCP-MCNC: 4 G/DL
ALP SERPL-CCNC: 98 U/L
ALT SERPL W/O P-5'-P-CCNC: 27 U/L
ANION GAP SERPL CALC-SCNC: 6 MMOL/L
APTT BLDCRRT: 25.4 SEC
AST SERPL-CCNC: 22 U/L
BASOPHILS # BLD AUTO: 0.08 K/UL
BASOPHILS NFR BLD: 1.2 %
BILIRUB SERPL-MCNC: 0.5 MG/DL
BUN SERPL-MCNC: 17 MG/DL
CALCIUM SERPL-MCNC: 9.2 MG/DL
CHLORIDE SERPL-SCNC: 111 MMOL/L
CHOLEST/HDLC SERPL: 3.1 {RATIO}
CO2 SERPL-SCNC: 22 MMOL/L
COMPLEXED PSA SERPL-MCNC: 3.7 NG/ML
CREAT SERPL-MCNC: 0.8 MG/DL
DIFFERENTIAL METHOD: ABNORMAL
EOSINOPHIL # BLD AUTO: 0.2 K/UL
EOSINOPHIL NFR BLD: 2.3 %
ERYTHROCYTE [DISTWIDTH] IN BLOOD BY AUTOMATED COUNT: 13.1 %
EST. GFR  (AFRICAN AMERICAN): >60 ML/MIN/1.73 M^2
EST. GFR  (NON AFRICAN AMERICAN): >60 ML/MIN/1.73 M^2
GLUCOSE SERPL-MCNC: 98 MG/DL
HCT VFR BLD AUTO: 45.5 %
HDL/CHOLESTEROL RATIO: 32.4 %
HDLC SERPL-MCNC: 139 MG/DL
HDLC SERPL-MCNC: 45 MG/DL
HGB BLD-MCNC: 15 G/DL
INR PPP: 1.1
LDLC SERPL CALC-MCNC: 65.4 MG/DL
LYMPHOCYTES # BLD AUTO: 1.1 K/UL
LYMPHOCYTES NFR BLD: 16.4 %
MCH RBC QN AUTO: 32.3 PG
MCHC RBC AUTO-ENTMCNC: 33 %
MCV RBC AUTO: 98 FL
MONOCYTES # BLD AUTO: 0.3 K/UL
MONOCYTES NFR BLD: 5.1 %
NEUTROPHILS # BLD AUTO: 4.9 K/UL
NEUTROPHILS NFR BLD: 74.8 %
NONHDLC SERPL-MCNC: 94 MG/DL
PLATELET # BLD AUTO: 241 K/UL
PMV BLD AUTO: 11.4 FL
POTASSIUM SERPL-SCNC: 4.1 MMOL/L
PROT SERPL-MCNC: 7.2 G/DL
PROTHROMBIN TIME: 11.4 SEC
RBC # BLD AUTO: 4.65 M/UL
SODIUM SERPL-SCNC: 139 MMOL/L
TRIGL SERPL-MCNC: 143 MG/DL
WBC # BLD AUTO: 6.48 K/UL

## 2017-05-24 PROCEDURE — 80053 COMPREHEN METABOLIC PANEL: CPT

## 2017-05-24 PROCEDURE — 80061 LIPID PANEL: CPT

## 2017-05-24 PROCEDURE — 85025 COMPLETE CBC W/AUTO DIFF WBC: CPT

## 2017-05-24 PROCEDURE — 85610 PROTHROMBIN TIME: CPT

## 2017-05-24 PROCEDURE — 99999 PR PBB SHADOW E&M-EST. PATIENT-LVL IV: CPT | Mod: PBBFAC,,,

## 2017-05-24 PROCEDURE — 99214 OFFICE O/P EST MOD 30 MIN: CPT | Mod: S$PBB,,,

## 2017-05-24 PROCEDURE — 84153 ASSAY OF PSA TOTAL: CPT

## 2017-05-24 PROCEDURE — 36415 COLL VENOUS BLD VENIPUNCTURE: CPT | Mod: PO

## 2017-05-24 PROCEDURE — 85730 THROMBOPLASTIN TIME PARTIAL: CPT

## 2017-05-24 PROCEDURE — 99999 PR PBB SHADOW E&M-EST. PATIENT-LVL IV: CPT | Mod: PBBFAC,,, | Performed by: PHYSICIAN ASSISTANT

## 2017-05-24 RX ORDER — LIDOCAINE HYDROCHLORIDE 10 MG/ML
9 INJECTION INFILTRATION; PERINEURAL
Status: DISCONTINUED | OUTPATIENT
Start: 2017-05-24 | End: 2017-05-24

## 2017-05-24 RX ORDER — ATORVASTATIN CALCIUM 40 MG/1
40 TABLET, FILM COATED ORAL DAILY
Qty: 30 TABLET | Refills: 11 | Status: SHIPPED | OUTPATIENT
Start: 2017-05-24 | End: 2018-02-16 | Stop reason: SDUPTHER

## 2017-05-24 RX ORDER — GABAPENTIN 300 MG/1
300 CAPSULE ORAL 3 TIMES DAILY
Qty: 90 CAPSULE | Refills: 2 | Status: SHIPPED | OUTPATIENT
Start: 2017-05-24 | End: 2017-08-16 | Stop reason: SDUPTHER

## 2017-05-24 RX ORDER — DICLOFENAC SODIUM 25 MG/1
25 TABLET, DELAYED RELEASE ORAL DAILY PRN
Qty: 30 TABLET | Refills: 1 | Status: SHIPPED | OUTPATIENT
Start: 2017-05-24 | End: 2018-03-21

## 2017-05-24 RX ORDER — GABAPENTIN 300 MG/1
CAPSULE ORAL
COMMUNITY
Start: 2017-05-04 | End: 2017-05-24 | Stop reason: SDUPTHER

## 2017-05-24 RX ORDER — ALBUTEROL SULFATE 108 UG/1
AEROSOL, METERED RESPIRATORY (INHALATION)
COMMUNITY
Start: 2017-04-28 | End: 2020-12-29

## 2017-05-24 RX ORDER — TRIAMCINOLONE ACETONIDE 40 MG/ML
40 INJECTION, SUSPENSION INTRA-ARTICULAR; INTRAMUSCULAR
Status: COMPLETED | OUTPATIENT
Start: 2017-05-24 | End: 2017-05-24

## 2017-05-24 RX ADMIN — TRIAMCINOLONE ACETONIDE 40 MG: 40 INJECTION, SUSPENSION INTRA-ARTICULAR; INTRAMUSCULAR at 09:05

## 2017-05-24 NOTE — PROGRESS NOTES
Subjective:       Patient ID: John Laguna is a 62 y.o. male.    Chief Complaint: Hypertension    HPI   Patient presents with a primary complaint of bruising on his upper extremities primarily.  He states that whenever he bumps into something even lightly he has bruising and skin tears easily.  The patient is concerned that he may have damaged his liver or something due to years of drug abuse.  The patient is currently taking an NSAID medication and some Norco on a regular basis along with his other meds for chronic health issues.  He denies any blood in his urine or bleeding gums or any other signs of bleeding.  Except the easy bruising.  Says the bruising is persistent and mild to moderate in intensity.  He denies any hematoma formation.    The patient presents with a history hyperlipidemia for which he currently takes   lipitor. The patient reports tolerating the medication well and is in good compliance. he denies medication side effects.  The patient denies chest pain, neuropathy, and myalgias.  The patient reports reduced fat intake. he  denies exercising.    Lab Results   Component Value Date    CHOL 177 05/03/2016    CHOL 160 10/02/2015    CHOL 146 11/25/2014     Lab Results   Component Value Date    HDL 65 05/03/2016    HDL 49 10/02/2015    HDL 44 11/25/2014     Lab Results   Component Value Date    LDLCALC 90.8 05/03/2016    LDLCALC 93.4 10/02/2015    LDLCALC 83.4 11/25/2014     Lab Results   Component Value Date    TRIG 106 05/03/2016    TRIG 88 10/02/2015    TRIG 93 11/25/2014     Lab Results   Component Value Date    CHOLHDL 36.7 05/03/2016    CHOLHDL 30.6 10/02/2015    CHOLHDL 30.1 11/25/2014         Review of Systems   Constitutional: Negative for activity change, appetite change, fatigue and unexpected weight change.   HENT: Negative.    Eyes: Negative.    Respiratory: Negative for cough, chest tightness, shortness of breath and wheezing.    Cardiovascular: Negative for chest pain,  palpitations and leg swelling.   Gastrointestinal: Negative for constipation, diarrhea, nausea and vomiting.   Endocrine: Negative.    Genitourinary: Negative.    Musculoskeletal: Negative.    Skin: Negative for color change.   Allergic/Immunologic: Negative.    Neurological: Negative for dizziness, weakness and light-headedness.   Hematological: Bruises/bleeds easily.   Psychiatric/Behavioral: Negative for sleep disturbance.         Objective:      Physical Exam   Constitutional: He is oriented to person, place, and time. Vital signs are normal. He appears well-developed and well-nourished. He is active and cooperative. No distress.   HENT:   Head: Normocephalic and atraumatic.   Eyes: Conjunctivae are normal. Pupils are equal, round, and reactive to light. No scleral icterus.   Neck: Normal range of motion. Neck supple.   Cardiovascular: Normal rate, regular rhythm, normal heart sounds and intact distal pulses.  Exam reveals no gallop and no friction rub.    No murmur heard.  Pulmonary/Chest: Effort normal and breath sounds normal. No respiratory distress. He has no wheezes. He has no rales. He exhibits no tenderness.   Musculoskeletal: Normal range of motion. He exhibits no edema or tenderness.   Neurological: He is alert and oriented to person, place, and time. He exhibits normal muscle tone. Coordination normal.   Skin: Skin is warm and dry. No rash noted. He is not diaphoretic. No erythema. No pallor.        Psychiatric: He has a normal mood and affect. His speech is normal and behavior is normal. Judgment and thought content normal.       Assessment:       1. Easy bruising    2. Hyperlipidemia, unspecified hyperlipidemia type    3. Encounter for prostate cancer screening          Plan:   Easy bruising  -     Comprehensive metabolic panel; Future; Expected date: 05/24/2017  -     CBC auto differential; Future; Expected date: 05/24/2017  -     APTT; Future; Expected date: 05/24/2017  -     Protime-INR; Future;  Expected date: 05/24/2017    Hyperlipidemia, unspecified hyperlipidemia type  -     Lipid panel; Future; Expected date: 05/24/2017  -     Comprehensive metabolic panel; Future; Expected date: 05/24/2017  -     atorvastatin (LIPITOR) 40 MG tablet; Take 1 tablet (40 mg total) by mouth once daily.  Dispense: 30 tablet; Refill: 11    Encounter for prostate cancer screening  -     PSA, Screening; Future; Expected date: 05/24/2017            Disclaimer: This note is prepared using voice recognition software.  As such there may be errors in the dictation.  It has not been proofread.

## 2017-06-04 RX ORDER — OMEPRAZOLE 20 MG/1
CAPSULE, DELAYED RELEASE ORAL
Qty: 30 CAPSULE | Refills: 0 | Status: SHIPPED | OUTPATIENT
Start: 2017-06-04 | End: 2017-07-07 | Stop reason: SDUPTHER

## 2017-06-12 RX ORDER — MELOXICAM 15 MG/1
15 TABLET ORAL DAILY
Qty: 30 TABLET | Refills: 6 | Status: SHIPPED | OUTPATIENT
Start: 2017-06-12 | End: 2018-01-11 | Stop reason: SDUPTHER

## 2017-07-07 RX ORDER — OMEPRAZOLE 20 MG/1
20 CAPSULE, DELAYED RELEASE ORAL DAILY
Qty: 30 CAPSULE | Refills: 6 | Status: SHIPPED | OUTPATIENT
Start: 2017-07-07 | End: 2018-02-01 | Stop reason: SDUPTHER

## 2017-07-19 DIAGNOSIS — M47.812 SPONDYLOSIS OF CERVICAL REGION WITHOUT MYELOPATHY OR RADICULOPATHY: ICD-10-CM

## 2017-07-19 DIAGNOSIS — M79.18 MYOFASCIAL PAIN: ICD-10-CM

## 2017-07-19 RX ORDER — GABAPENTIN 300 MG/1
CAPSULE ORAL
Qty: 90 CAPSULE | Refills: 0 | OUTPATIENT
Start: 2017-07-19

## 2017-07-19 RX ORDER — DICLOFENAC SODIUM 25 MG/1
TABLET, DELAYED RELEASE ORAL
Qty: 30 TABLET | Refills: 0 | OUTPATIENT
Start: 2017-07-19

## 2017-08-15 RX ORDER — HYDROCODONE BITARTRATE AND ACETAMINOPHEN 5; 325 MG/1; MG/1
1 TABLET ORAL 3 TIMES DAILY PRN
Qty: 90 TABLET | Refills: 0 | Status: SHIPPED | OUTPATIENT
Start: 2017-10-23 | End: 2017-11-17

## 2017-08-15 RX ORDER — HYDROCODONE BITARTRATE AND ACETAMINOPHEN 5; 325 MG/1; MG/1
1 TABLET ORAL 3 TIMES DAILY PRN
Qty: 90 TABLET | Refills: 0 | Status: SHIPPED | OUTPATIENT
Start: 2017-09-24 | End: 2017-08-16 | Stop reason: SDUPTHER

## 2017-08-16 ENCOUNTER — OFFICE VISIT (OUTPATIENT)
Dept: PAIN MEDICINE | Facility: CLINIC | Age: 62
End: 2017-08-16
Payer: MEDICAID

## 2017-08-16 VITALS
HEIGHT: 72 IN | WEIGHT: 213 LBS | BODY MASS INDEX: 28.85 KG/M2 | RESPIRATION RATE: 18 BRPM | DIASTOLIC BLOOD PRESSURE: 77 MMHG | SYSTOLIC BLOOD PRESSURE: 135 MMHG | HEART RATE: 72 BPM

## 2017-08-16 DIAGNOSIS — G89.4 CHRONIC PAIN DISORDER: ICD-10-CM

## 2017-08-16 DIAGNOSIS — M50.30 DDD (DEGENERATIVE DISC DISEASE), CERVICAL: ICD-10-CM

## 2017-08-16 DIAGNOSIS — M79.18 MYOFASCIAL PAIN: Primary | ICD-10-CM

## 2017-08-16 DIAGNOSIS — M48.02 NEUROFORAMINAL STENOSIS OF CERVICAL SPINE: ICD-10-CM

## 2017-08-16 DIAGNOSIS — M54.2 NECK AND SHOULDER PAIN: ICD-10-CM

## 2017-08-16 DIAGNOSIS — M25.519 NECK AND SHOULDER PAIN: ICD-10-CM

## 2017-08-16 DIAGNOSIS — M47.812 SPONDYLOSIS OF CERVICAL REGION WITHOUT MYELOPATHY OR RADICULOPATHY: ICD-10-CM

## 2017-08-16 PROCEDURE — 3008F BODY MASS INDEX DOCD: CPT | Mod: ,,, | Performed by: PHYSICIAN ASSISTANT

## 2017-08-16 PROCEDURE — 99214 OFFICE O/P EST MOD 30 MIN: CPT | Mod: 25,S$PBB,, | Performed by: PHYSICIAN ASSISTANT

## 2017-08-16 PROCEDURE — 99214 OFFICE O/P EST MOD 30 MIN: CPT | Mod: PBBFAC | Performed by: PHYSICIAN ASSISTANT

## 2017-08-16 PROCEDURE — 20553 NJX 1/MLT TRIGGER POINTS 3/>: CPT | Mod: S$PBB,,, | Performed by: PHYSICIAN ASSISTANT

## 2017-08-16 PROCEDURE — 3075F SYST BP GE 130 - 139MM HG: CPT | Mod: ,,, | Performed by: PHYSICIAN ASSISTANT

## 2017-08-16 PROCEDURE — 20553 NJX 1/MLT TRIGGER POINTS 3/>: CPT | Mod: PBBFAC | Performed by: PHYSICIAN ASSISTANT

## 2017-08-16 PROCEDURE — 99999 PR PBB SHADOW E&M-EST. PATIENT-LVL IV: CPT | Mod: PBBFAC,,, | Performed by: PHYSICIAN ASSISTANT

## 2017-08-16 PROCEDURE — 3078F DIAST BP <80 MM HG: CPT | Mod: ,,, | Performed by: PHYSICIAN ASSISTANT

## 2017-08-16 RX ORDER — TRIAMCINOLONE ACETONIDE 40 MG/ML
40 INJECTION, SUSPENSION INTRA-ARTICULAR; INTRAMUSCULAR
Status: COMPLETED | OUTPATIENT
Start: 2017-08-16 | End: 2017-08-16

## 2017-08-16 RX ORDER — HYDROCODONE BITARTRATE AND ACETAMINOPHEN 5; 325 MG/1; MG/1
1 TABLET ORAL 3 TIMES DAILY PRN
Qty: 90 TABLET | Refills: 0 | Status: SHIPPED | OUTPATIENT
Start: 2017-08-26 | End: 2017-09-25

## 2017-08-16 RX ORDER — GABAPENTIN 300 MG/1
300 CAPSULE ORAL 3 TIMES DAILY
Qty: 90 CAPSULE | Refills: 2 | Status: SHIPPED | OUTPATIENT
Start: 2017-08-16 | End: 2017-09-15

## 2017-08-16 RX ADMIN — TRIAMCINOLONE ACETONIDE 40 MG: 40 INJECTION, SUSPENSION INTRA-ARTICULAR; INTRAMUSCULAR at 08:08

## 2017-08-16 NOTE — PROGRESS NOTES
Chief Pain Complaint:  Neck Pain, Bilateral Arm Pain, Shoulder Pain     History of Present Illness:  This patient is a 62 y.o. male who presents today complaining of the above noted pain/s. The patient describes this pain as follows.    - duration of pain: pain for years   - timing: constant   - character: sharp, aching  - radiating, dermatomal: extends to the shoulders b/l, nondermatomal  - antecedent trauma, prior spinal surgery: no prior trauma, no prior spinal surgery, bilateral shoulder surgery  - pertinent negatives: No fever, No chills, No weight loss, No bladder dysfunction, No bowel dysfunction, No saddle anesthesia  - pertinent positives: generalized nonspecific Upper Extremity weakness bilaterally    - medications, other therapies tried (physical therapy, injections):     >> Norco, flexeril, gabapentin, Mobic, Topamax, Fioricet    >> Has previously undergone Physical Therapy - which he does not find helpful but does exercises at home    >> Has previously undergone spinal injection/s      IMAGING / Labs / Studies (reviewed on 8/16/2017):    5-12-16 XR Lumbar and Thoracic:  Findings: There is slight levoscoliotic curvature of the lumbar spine.  There is grade 2 anterolisthesis of L5 on S1 with minimal grade 1 anterolisthesis of L4 on L5 and slight retrolisthesis of L3 on L4.  Moderate to severe disc height loss present at L5-S1 with mild disc height loss at L3-L4 and L4-L5. Bilateral facet arthropathy present at L4-L5 and L5-S1. No acute fractures visualized.  The remaining visualized osseous and soft tissue structures demonstrate no appreciable abnormality.    Findings: There is mild dextroscoliotic curvature of the thoracic spine.  There are multiple mild age-indeterminate compression deformities involving the mid and lower thoracic spine with suspected involvement of T6-T11.  There is mild multilevel disc height loss noted throughout the thoracic spine with associated disc osteophyte complex production.  Posterior elements appear grossly intact.   The remaining visualized osseous and soft tissue structures demonstrate no appreciable abnormality.       4/28/14 MRI Cervical Spine Without Contrast    Narrative Study:   Cervical spine MRI without contrast.04/28/14 11:09:00  Technique:  Multiplanar non contrast images obtained on the cervical spine.   History: Left neck pain.  Left shoulder pain.  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  No abnormal signal in the cord.  The craniocervical junction is normal.  C2-3: Facet hypertrophy on the left side with mild left sided neural foraminal narrowing  C3-4: Disk space narrowing.  Broad-based osteophyte disk foot.  Bilateral uncovertebral-joint disease with moderate severe neural frontal narrowing.  C4-5: Disk space narrowing.  Broad-based osteophyte disk foot.  Bilateral uncovertebral joint disease with mild to severe neural foraminal narrowing.  Decreased CSF spaces anterior-posterior to the cord.  C5-6: Broad-based osteophyte and disk bulge.  Bilateral uncovertebral joint disease with mild to moderate bilateral bony neural frontal narrowing greater on the left side.  Disk protrusion into the left lateral recess.  C6-7: Disk space narrowing.  Broad-based osteophyte and disk bulge bilateral uncovertebral joint disease with mild neural foraminal narrowing.  C7-T1: Mild to space narrowing.  Mild uncovertebral joint disease.  No significant neural frontal narrowing.         Review of Systems:  CONSTITUTIONAL: patient denies any fever, chills, or weight loss  SKIN: patient denies any rash or itching  RESPIRATORY: patient denies having any shortness of breath  GASTROINTESTINAL: patient denies having any diarrhea, constipation, or bowel incontinence  GENITOURINARY: patient denies having any abnormal bladder function    MUSCULOSKELETAL:  - patient complains of the above noted pain/s (see chief pain complaint)    NEUROLOGICAL:   - pain as above  - strength in Upper  extremities is decreased, BILATERALLY  - sensation in Upper extremities is abnormal, BILATERALLY  - patient denies any loss of bowel or bladder control      PSYCHIATRIC: patient denies any change in mood      Physical Exam:  Vitals:  /77 (BP Location: Right arm, Patient Position: Sitting, BP Method: Large (Automatic))   Pulse 72   Resp 18   Ht 6' (1.829 m)   Wt 96.6 kg (213 lb)   BMI 28.89 kg/m²    (reviewed on 8/16/2017)    General: alert and oriented  Gait: normal gait  Skin: No rashes, No discoloration, No obvious lesions  HEENT: EOMI  Respiratory: respirations nonlabored    Musculoskeletal :  - Any pain on flexion, extension, rotation:    >> pain on flexion, extension, and rotation of cervical spine  - Spurling's Test: causes No radiating pain  - Any tenderness to palpation across paraspinal muscles, joints, bursae:     >> tender to palpation across cervical paraspinals    >> TTP over Bilateral Upper Trapezius, Bilateral Middle Trapezius, Bilateral Levator Scapulae, Bilateral Rhomboids    Neuro:  - Extremity Strength:     >> LEFT :: 5/5    >> RIGHT :: 5/5  - Extremity Reflexes:    >> LEFT  :: 1+    >> RIGHT :: 1+  - Sensory (sensation to light touch):    >> LEFT :: intact    >> RIGHT :: intact  - Luciano's sign:     >> LEFT :: negative    >> RIGHT :: negative      Psych:  Mood and affect is appropriate      Assessment:  Cervical Spondylosis  Myofascial Pain  Cervical DDD    Plan:  Patient returns for follow-up. He has widespread joint pain, including cervical facet pain along with myofascial pain.   - Will administer trigger point injections today. Procedure note below.  - Will refill gabapentin 300mg TID and Norco 5mg TID PRN x 3 months, which he reports is helping his pain. Will send in first month electronically.  - LA  reviewed and appropriate. He last filled on 7-28-17.  - Will order UDS next visit to ensure medication compliance. Last UDS was compliant.  RTC in 3 months for med refill. I  discussed the risks, benefits, and alternatives to potential treatment options. All questions and concerns were fully addressed today in clinic. Dr. Nunes was consulted regarding the patient plan and agrees.            >> Pain Disability Index:  3/1/2017 :: 59  5/24/2017 :: 70    >>UDS:  8-11-16 :: appropriate (+hydrocodone metabolites, +barbituates)  1/3/2017 :: appropriate  5/24/2017 :: appropriate    >>Opioid Risk Tool:  1/3/2017 :: 6        Procedures:  Procedure: Trigger point injection      Muscle/s injected: Bilateral Upper Trapezius, Bilateral Middle Trapezius, Bilateral Levator Scapulae, Bilateral Rhomboids, Cervical Paraspinals      Side: Bilateral      Description of Procedure:  Prior to starting this procedure, risks, benefits, complications, and alternatives were discussed with the patient. The patient agreed to proceed with the procedure. The procedural sites were identified, marked, and widely prepped with ChloraPrep.       A 25-gauge 1.5 inch needle was introduced into the above noted muscle/s. Following negative aspiration, a volume of 1 to 1.5 mL of a solution comprised of 9 mL of 2% Lidocaine, and 1 mL of Triamcinolone (40 mg/mL) was injected. No pain or paresthesia was noted on injection. This process was repeated at multiple sites throughout the above noted muscle/s until the above noted solution was exhausted. The patient tolerated the procedure well. The injection sites were cleaned and bandaged as needed.      Complications: None

## 2017-09-22 DIAGNOSIS — I10 ESSENTIAL HYPERTENSION: ICD-10-CM

## 2017-09-22 RX ORDER — AMLODIPINE BESYLATE 5 MG/1
TABLET ORAL
Qty: 30 TABLET | Refills: 7 | Status: SHIPPED | OUTPATIENT
Start: 2017-09-22 | End: 2018-02-16

## 2017-09-25 ENCOUNTER — OFFICE VISIT (OUTPATIENT)
Dept: NEUROLOGY | Facility: CLINIC | Age: 62
End: 2017-09-25
Payer: MEDICAID

## 2017-09-25 VITALS
DIASTOLIC BLOOD PRESSURE: 80 MMHG | HEART RATE: 60 BPM | HEIGHT: 71 IN | BODY MASS INDEX: 28.09 KG/M2 | WEIGHT: 200.63 LBS | SYSTOLIC BLOOD PRESSURE: 160 MMHG

## 2017-09-25 DIAGNOSIS — G43.119 INTRACTABLE MIGRAINE WITH AURA WITHOUT STATUS MIGRAINOSUS: Primary | ICD-10-CM

## 2017-09-25 PROCEDURE — 3077F SYST BP >= 140 MM HG: CPT | Mod: ,,, | Performed by: PSYCHIATRY & NEUROLOGY

## 2017-09-25 PROCEDURE — 3079F DIAST BP 80-89 MM HG: CPT | Mod: ,,, | Performed by: PSYCHIATRY & NEUROLOGY

## 2017-09-25 PROCEDURE — 99213 OFFICE O/P EST LOW 20 MIN: CPT | Mod: PBBFAC | Performed by: PSYCHIATRY & NEUROLOGY

## 2017-09-25 PROCEDURE — 99214 OFFICE O/P EST MOD 30 MIN: CPT | Mod: S$PBB,,, | Performed by: PSYCHIATRY & NEUROLOGY

## 2017-09-25 PROCEDURE — 3008F BODY MASS INDEX DOCD: CPT | Mod: ,,, | Performed by: PSYCHIATRY & NEUROLOGY

## 2017-09-25 PROCEDURE — 99999 PR PBB SHADOW E&M-EST. PATIENT-LVL III: CPT | Mod: PBBFAC,,, | Performed by: PSYCHIATRY & NEUROLOGY

## 2017-09-25 RX ORDER — TOPIRAMATE 100 MG/1
100 TABLET, FILM COATED ORAL DAILY
Qty: 30 TABLET | Refills: 11 | Status: SHIPPED | OUTPATIENT
Start: 2017-09-25 | End: 2018-10-10 | Stop reason: SDUPTHER

## 2017-09-25 NOTE — PROGRESS NOTES
"This is a 62-year-old patient who has a prior history of migraine headache that is intractable.  He also has a prior history of severe degenerative joint disease of the cervical spine as well as generalized degenerative joint disease.    His migraine headache continues to occur at least 3-4 times a week.  He feels that the headache is triggered by exposure to bright light or loud noises.  The pain is distributed in the bitemporal area as a pounding pain but is also described as "like a hot needles stabbing".  The headache is associated with nausea, photophobia, phonophobia, and movement intolerance making him unable to perform work-related activities because of the frequency of the headache.    The patient is not certain that the addition of topiramate has reduced the frequency of his headache syndrome.  He has not noted any side effects from the medication however and denies any change in taste, change in speech, or change in memory.    The patient continues to receive pain management for his degenerative joint disease and is on a regular analgesic medication.      ROS:  GENERAL: No fever, chills, fatigability or weight loss.  SKIN: No rashes, itching or changes in color or texture of skin.  HEAD: No recent head trauma.  EYES: Visual acuity fine. No photophobia, ocular pain or diplopia.  EARS: Denies ear pain, discharge or vertigo.  NOSE: No loss of smell, no epistaxis or postnasal drip.  MOUTH & THROAT: No hoarseness or change in voice. No excessive gum bleeding.  NODES: Denies swollen glands.  CHEST: Denies THORNTON, cyanosis, wheezing, cough and sputum production.  CARDIOVASCULAR: Denies chest pain, PND, orthopnea or reduced exercise tolerance.  ABDOMEN: Appetite fine. No weight loss. Denies diarrhea, abdominal pain, hematemesis or blood in stool.  URINARY: No flank pain, dysuria or hematuria.  PERIPHERAL VASCULAR: No claudication or cyanosis.  MUSCULOSKELETAL: The patient has generalized joint pain as well as " chronic neck and low back pain.  NEUROLOGIC: No history of seizures, paralysis, alteration of gait or coordination.    The patient's past history, family history, and social history are reviewed within the electronic medical record and with the patient.    PE:   VITAL SIGNS: Blood pressure 160/80, pulse 72, weight 91 kg, height 5 foot 11 inches, BMI 27.98  APPEARANCE: Well nourished, well developed, in no acute distress.    HEAD: Normocephalic, atraumatic.  EYES: PERRL. EOMI.  Non-icteric sclerae.    EARS: TM's intact. Light reflex normal. No retraction or perforation.    NOSE: Mucosa pink. Airway clear.  MOUTH & THROAT: No tonsillar enlargement. No pharyngeal erythema or exudate. No stridor.  NECK: Patient has marked limitation of motion of the neck due to complaints of pain and muscle spasm.  No bruits.  CHEST: Lungs clear to auscultation.  CARDIOVASCULAR: Regular rhythm without significant murmurs.  ABDOMEN: Bowel sounds normal. Not distended. Soft. No tenderness or masses.  MUSCULOSKELETAL: Significant degenerative changes are seen in the small joints of both hands.  There is marked tenderness to palpation in the interphalangeal joints bilaterally.  NEUROLOGIC:   Mental Status:  The patient is well oriented to person, time, place, and situation.  The patient is attentive to the environment and cooperative for the exam.  Cranial Nerves: II-XII grossly intact. Fundoscopic exam is normal.  No hemorrhage, exudate or papilledema is present. The extraocular muscles are intact in the cardinal directions of gaze.  No ptosis is present. Facial features are symmetrical.  Speech is normal in fluency, diction, and phrasing.  Tongue protrudes in the midline.    Gait and Station:  Romberg is negative.  Good alternate armswing with normal gait.  Motor:  No downdrift of either arm when held at shoulder level.  Manual muscle testing of proximal and distal muscles of both upper and lower extremities is normal. Muscle mass is  normal.  Muscle tone is normal.  Sensory:  Intact both upper and lower extremities to pin prick, touch, and vibration.  Cerebellar:  Finger to nose done well.  Alternating movements intact.  No involuntary movements or tremor seen.  Reflexes:  Stretch reflexes are absent both biceps, triceps and brachioradialis.  Knee jerks are 1+ and ankle jerks are 1+ bilaterally.  Plantar stimulation is flexor bilaterally and no pathological reflexes are seen      ASSESSMENT:  1.  Migraine headache without aura, intractable, without status migrainosus  2.  Generalized degenerative joint disease  3.  Cervical spondylosis    RECOMMENDATIONS:  1.  Change topiramate to 100 mg daily as a means of migraine prophylaxis  2.  The patient was advised that we will not prescribe analgesics as he is receiving analgesics for pain management.  3.  Routine follow-up with neurology in 6 months.  This note is generated with speech recognition software and is subject to transcription error and sound alike phrases that may be missed by proofreading.

## 2017-10-04 RX ORDER — BUTALBITAL, ACETAMINOPHEN AND CAFFEINE 50; 325; 40 MG/1; MG/1; MG/1
TABLET ORAL
Qty: 30 TABLET | Refills: 0 | Status: SHIPPED | OUTPATIENT
Start: 2017-10-04 | End: 2020-12-29 | Stop reason: SDUPTHER

## 2017-10-22 DIAGNOSIS — M79.18 MYOFASCIAL PAIN: ICD-10-CM

## 2017-10-22 DIAGNOSIS — M47.812 SPONDYLOSIS OF CERVICAL REGION WITHOUT MYELOPATHY OR RADICULOPATHY: ICD-10-CM

## 2017-10-23 RX ORDER — GABAPENTIN 300 MG/1
CAPSULE ORAL
Qty: 90 CAPSULE | Refills: 0 | Status: SHIPPED | OUTPATIENT
Start: 2017-10-23 | End: 2017-11-17

## 2017-11-14 ENCOUNTER — PATIENT MESSAGE (OUTPATIENT)
Dept: PAIN MEDICINE | Facility: CLINIC | Age: 62
End: 2017-11-14

## 2017-11-17 ENCOUNTER — OFFICE VISIT (OUTPATIENT)
Dept: PAIN MEDICINE | Facility: CLINIC | Age: 62
End: 2017-11-17
Payer: MEDICAID

## 2017-11-17 VITALS
RESPIRATION RATE: 18 BRPM | SYSTOLIC BLOOD PRESSURE: 143 MMHG | BODY MASS INDEX: 28 KG/M2 | DIASTOLIC BLOOD PRESSURE: 77 MMHG | WEIGHT: 200 LBS | HEIGHT: 71 IN | HEART RATE: 76 BPM

## 2017-11-17 DIAGNOSIS — M79.18 MYOFASCIAL PAIN: ICD-10-CM

## 2017-11-17 DIAGNOSIS — M47.812 SPONDYLOSIS OF CERVICAL REGION WITHOUT MYELOPATHY OR RADICULOPATHY: Primary | ICD-10-CM

## 2017-11-17 PROCEDURE — 99214 OFFICE O/P EST MOD 30 MIN: CPT | Mod: 25,S$PBB,, | Performed by: ANESTHESIOLOGY

## 2017-11-17 PROCEDURE — 20553 NJX 1/MLT TRIGGER POINTS 3/>: CPT | Mod: S$PBB,,, | Performed by: ANESTHESIOLOGY

## 2017-11-17 PROCEDURE — 99999 PR PBB SHADOW E&M-EST. PATIENT-LVL III: CPT | Mod: PBBFAC,,, | Performed by: ANESTHESIOLOGY

## 2017-11-17 PROCEDURE — 99213 OFFICE O/P EST LOW 20 MIN: CPT | Mod: PBBFAC | Performed by: ANESTHESIOLOGY

## 2017-11-17 PROCEDURE — 20553 NJX 1/MLT TRIGGER POINTS 3/>: CPT | Mod: PBBFAC | Performed by: ANESTHESIOLOGY

## 2017-11-17 RX ORDER — HYDROCODONE BITARTRATE AND ACETAMINOPHEN 7.5; 325 MG/1; MG/1
1 TABLET ORAL 3 TIMES DAILY PRN
Qty: 90 TABLET | Refills: 0 | Status: SHIPPED | OUTPATIENT
Start: 2018-01-13 | End: 2018-02-12

## 2017-11-17 RX ORDER — HYDROCODONE BITARTRATE AND ACETAMINOPHEN 7.5; 325 MG/1; MG/1
1 TABLET ORAL 3 TIMES DAILY PRN
Qty: 90 TABLET | Refills: 0 | Status: SHIPPED | OUTPATIENT
Start: 2017-11-17 | End: 2017-11-17 | Stop reason: SDUPTHER

## 2017-11-17 RX ORDER — HYDROCODONE BITARTRATE AND ACETAMINOPHEN 7.5; 325 MG/1; MG/1
1 TABLET ORAL 3 TIMES DAILY PRN
Qty: 90 TABLET | Refills: 0 | Status: SHIPPED | OUTPATIENT
Start: 2017-12-15 | End: 2017-11-17 | Stop reason: SDUPTHER

## 2017-11-17 RX ORDER — METHYLPREDNISOLONE ACETATE 80 MG/ML
80 INJECTION, SUSPENSION INTRA-ARTICULAR; INTRALESIONAL; INTRAMUSCULAR; SOFT TISSUE
Status: COMPLETED | OUTPATIENT
Start: 2017-11-17 | End: 2017-11-17

## 2017-11-17 RX ADMIN — METHYLPREDNISOLONE ACETATE 80 MG: 80 INJECTION, SUSPENSION INTRALESIONAL; INTRAMUSCULAR; INTRASYNOVIAL; SOFT TISSUE at 08:11

## 2017-11-17 NOTE — PROGRESS NOTES
Chief Pain Complaint:  Neck Pain, Bilateral Arm Pain, Shoulder Pain     History of Present Illness:  This patient is a 62 y.o. male who presents today complaining of the above noted pain/s. The patient describes this pain as follows.    - duration of pain: pain for years   - timing: constant   - character: sharp, aching  - radiating, dermatomal: extends to the shoulders b/l, nondermatomal  - antecedent trauma, prior spinal surgery: no prior trauma, no prior spinal surgery, bilateral shoulder surgery  - pertinent negatives: No fever, No chills, No weight loss, No bladder dysfunction, No bowel dysfunction, No saddle anesthesia  - pertinent positives: generalized nonspecific Upper Extremity weakness bilaterally    - medications, other therapies tried (physical therapy, injections):     >> Norco, flexeril, gabapentin, Mobic, Topamax, Fioricet    >> Has previously undergone Physical Therapy - which he does not find helpful but does exercises at home    >> Has previously undergone spinal injection/s        IMAGING / Labs / Studies (reviewed on 11/17/2017):      5-12-16 XR Lumbar and Thoracic:  Findings: There is slight levoscoliotic curvature of the lumbar spine.  There is grade 2 anterolisthesis of L5 on S1 with minimal grade 1 anterolisthesis of L4 on L5 and slight retrolisthesis of L3 on L4.  Moderate to severe disc height loss present at L5-S1 with mild disc height loss at L3-L4 and L4-L5. Bilateral facet arthropathy present at L4-L5 and L5-S1. No acute fractures visualized.  The remaining visualized osseous and soft tissue structures demonstrate no appreciable abnormality.    Findings: There is mild dextroscoliotic curvature of the thoracic spine.  There are multiple mild age-indeterminate compression deformities involving the mid and lower thoracic spine with suspected involvement of T6-T11.  There is mild multilevel disc height loss noted throughout the thoracic spine with associated disc osteophyte complex  production. Posterior elements appear grossly intact.   The remaining visualized osseous and soft tissue structures demonstrate no appreciable abnormality.         4/28/14 MRI Cervical Spine Without Contrast    Narrative Study:   Cervical spine MRI without contrast.04/28/14 11:09:00  Technique:  Multiplanar non contrast images obtained on the cervical spine.   History: Left neck pain.  Left shoulder pain.  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  No abnormal signal in the cord.  The craniocervical junction is normal.  C2-3: Facet hypertrophy on the left side with mild left sided neural foraminal narrowing  C3-4: Disk space narrowing.  Broad-based osteophyte disk foot.  Bilateral uncovertebral-joint disease with moderate severe neural frontal narrowing.  C4-5: Disk space narrowing.  Broad-based osteophyte disk foot.  Bilateral uncovertebral joint disease with mild to severe neural foraminal narrowing.  Decreased CSF spaces anterior-posterior to the cord.  C5-6: Broad-based osteophyte and disk bulge.  Bilateral uncovertebral joint disease with mild to moderate bilateral bony neural frontal narrowing greater on the left side.  Disk protrusion into the left lateral recess.  C6-7: Disk space narrowing.  Broad-based osteophyte and disk bulge bilateral uncovertebral joint disease with mild neural foraminal narrowing.  C7-T1: Mild to space narrowing.  Mild uncovertebral joint disease.  No significant neural frontal narrowing.         Review of Systems:  CONSTITUTIONAL: patient denies any fever, chills, or weight loss  SKIN: patient denies any rash or itching  RESPIRATORY: patient denies having any shortness of breath  GASTROINTESTINAL: patient denies having any diarrhea, constipation, or bowel incontinence  GENITOURINARY: patient denies having any abnormal bladder function    MUSCULOSKELETAL:  - patient complains of the above noted pain/s (see chief pain complaint)    NEUROLOGICAL:   - pain as above  - strength  "in Upper extremities is decreased, BILATERALLY  - sensation in Upper extremities is abnormal, BILATERALLY  - patient denies any loss of bowel or bladder control      PSYCHIATRIC: patient denies any change in mood      Physical Exam:  Vitals:  BP (!) 143/77 (BP Location: Right arm, Patient Position: Sitting, BP Method: Medium (Automatic))   Pulse 76   Resp 18   Ht 5' 11" (1.803 m)   Wt 90.7 kg (200 lb)   BMI 27.89 kg/m²    (reviewed on 11/17/2017)    General: alert and oriented  Gait: normal gait  Skin: No rashes, No discoloration, No obvious lesions  HEENT: EOMI  Respiratory: respirations nonlabored    Musculoskeletal :  - Any pain on flexion, extension, rotation:    >> pain on flexion, extension, and rotation of cervical spine    - Any tenderness to palpation across paraspinal muscles, joints, bursae:     >> tender to palpation across cervical paraspinals, bilateral Upper Trapezius, Bilateral Middle Trapezius, Bilateral Levator Scapulae, Bilateral Rhomboids    Neuro:  - Extremity Strength:     >> LEFT :: 5/5    >> RIGHT :: 5/5    - Luciano's sign:     >> LEFT :: negative    >> RIGHT :: negative      Psych:  Mood and affect is appropriate      Assessment:  Cervical Spondylosis  Myofascial Pain  Cervical DDD    Plan:  - Patient returns for follow-up. He has widespread joint pain, including cervical facet pain along with myofascial pain.   - Will administer trigger point injections today. Procedure note below.  - Stop gabapentin as it does not help, increase Norco to 7.5 mg TID PRN x 3 months, which he reports is helping his pain.  - TPI's today in clinic.  - LA  reviewed and appropriate. He last filled on 10-23-17.  - RTC in 3 months for med refill.   - I discussed the risks, benefits, and alternatives to potential treatment options. All questions and concerns were fully addressed today in clinic.       >> Pain Disability Index:  3/1/2017 :: 59  5/24/2017 :: 70    >>UDS:  8-11-16 :: appropriate (+hydrocodone " metabolites, +barbituates)  1/3/2017 :: appropriate  5/24/2017 :: appropriate    >>Opioid Risk Tool:  1/3/2017 :: 6        Procedures:  Procedure: Trigger point injections  Muscle/s injected: Bilateral Upper Trapezius, Bilateral Middle Trapezius, Bilateral Levator Scapulae, Bilateral Rhomboids, Cervical Paraspinals  Side: Bilateral    Description of Procedure:  Prior to starting this procedure, risks, benefits, complications, and alternatives were discussed with the patient. The patient agreed to proceed with the procedure. The procedural sites were identified, marked, and widely prepped with ChloraPrep.   A 25-gauge 1.5 inch needle was introduced into the above noted muscle/s. Following negative aspiration, a volume of 1 to 1.5 mL of a solution comprised of 9 mL of 1% Lidocaine, and 1 mL of Depo-Medrol (80 mg/mL) was injected. No pain or paresthesia was noted on injection. This process was repeated at multiple sites throughout the above noted muscle/s until the above noted solution was exhausted. The patient tolerated the procedure well. The injection sites were cleaned and bandaged as needed.      Complications: None

## 2017-12-17 RX ORDER — AMLODIPINE BESYLATE 5 MG/1
TABLET ORAL
Qty: 90 TABLET | Refills: 0 | Status: SHIPPED | OUTPATIENT
Start: 2017-12-17 | End: 2018-02-16 | Stop reason: SDUPTHER

## 2018-01-11 RX ORDER — MELOXICAM 15 MG/1
TABLET ORAL
Qty: 30 TABLET | Refills: 0 | Status: SHIPPED | OUTPATIENT
Start: 2018-01-11 | End: 2018-03-02 | Stop reason: SDUPTHER

## 2018-02-01 RX ORDER — OMEPRAZOLE 20 MG/1
CAPSULE, DELAYED RELEASE ORAL
Qty: 30 CAPSULE | Refills: 2 | Status: SHIPPED | OUTPATIENT
Start: 2018-02-01 | End: 2018-02-16

## 2018-02-15 ENCOUNTER — OFFICE VISIT (OUTPATIENT)
Dept: PAIN MEDICINE | Facility: CLINIC | Age: 63
End: 2018-02-15
Payer: MEDICAID

## 2018-02-15 ENCOUNTER — TELEPHONE (OUTPATIENT)
Dept: FAMILY MEDICINE | Facility: CLINIC | Age: 63
End: 2018-02-15

## 2018-02-15 VITALS
SYSTOLIC BLOOD PRESSURE: 158 MMHG | HEIGHT: 71 IN | HEART RATE: 83 BPM | DIASTOLIC BLOOD PRESSURE: 87 MMHG | WEIGHT: 200 LBS | BODY MASS INDEX: 28 KG/M2 | RESPIRATION RATE: 18 BRPM

## 2018-02-15 DIAGNOSIS — M25.519 NECK AND SHOULDER PAIN: ICD-10-CM

## 2018-02-15 DIAGNOSIS — M54.2 NECK AND SHOULDER PAIN: ICD-10-CM

## 2018-02-15 DIAGNOSIS — M48.02 NEUROFORAMINAL STENOSIS OF CERVICAL SPINE: ICD-10-CM

## 2018-02-15 DIAGNOSIS — G89.4 CHRONIC PAIN DISORDER: ICD-10-CM

## 2018-02-15 DIAGNOSIS — M79.18 MYOFASCIAL PAIN: Primary | ICD-10-CM

## 2018-02-15 DIAGNOSIS — M50.30 DDD (DEGENERATIVE DISC DISEASE), CERVICAL: ICD-10-CM

## 2018-02-15 DIAGNOSIS — M47.812 SPONDYLOSIS OF CERVICAL REGION WITHOUT MYELOPATHY OR RADICULOPATHY: ICD-10-CM

## 2018-02-15 PROCEDURE — 99214 OFFICE O/P EST MOD 30 MIN: CPT | Mod: PBBFAC | Performed by: PHYSICIAN ASSISTANT

## 2018-02-15 PROCEDURE — 3008F BODY MASS INDEX DOCD: CPT | Mod: ,,, | Performed by: PHYSICIAN ASSISTANT

## 2018-02-15 PROCEDURE — 99999 PR PBB SHADOW E&M-EST. PATIENT-LVL IV: CPT | Mod: PBBFAC,,, | Performed by: PHYSICIAN ASSISTANT

## 2018-02-15 PROCEDURE — 99214 OFFICE O/P EST MOD 30 MIN: CPT | Mod: 25,S$PBB,, | Performed by: PHYSICIAN ASSISTANT

## 2018-02-15 RX ORDER — METHYLPREDNISOLONE ACETATE 40 MG/ML
40 INJECTION, SUSPENSION INTRA-ARTICULAR; INTRALESIONAL; INTRAMUSCULAR; SOFT TISSUE ONCE
Status: COMPLETED | OUTPATIENT
Start: 2018-02-15 | End: 2018-02-15

## 2018-02-15 RX ORDER — HYDROCODONE BITARTRATE AND ACETAMINOPHEN 7.5; 325 MG/1; MG/1
1 TABLET ORAL 3 TIMES DAILY PRN
Qty: 90 TABLET | Refills: 0 | Status: SHIPPED | OUTPATIENT
Start: 2018-02-15 | End: 2018-03-17

## 2018-02-15 RX ADMIN — METHYLPREDNISOLONE ACETATE 40 MG: 40 INJECTION, SUSPENSION INTRALESIONAL; INTRAMUSCULAR; INTRASYNOVIAL; SOFT TISSUE at 08:02

## 2018-02-15 NOTE — TELEPHONE ENCOUNTER
----- Message from Lynette Guo sent at 2/15/2018 10:43 AM CST -----  Contact: pt   Call pt regarding getting fitted in to see the doctor. Pt states that he has lost his wife and stressing.   [phone] (home)

## 2018-02-15 NOTE — TELEPHONE ENCOUNTER
Patient is grieving from the loss of his spouse(Oriana Hamjose) and is asking for something to be called in. Please review and advise.

## 2018-02-15 NOTE — PROGRESS NOTES
Chief Pain Complaint:  Neck Pain, Bilateral Arm Pain, Shoulder Pain     History of Present Illness:  This patient is a 62 y.o. male who presents today complaining of the above noted pain/s. The patient describes this pain as follows.    - duration of pain: pain for years   - timing: constant   - character: sharp, aching  - radiating, dermatomal: extends to the shoulders b/l, nondermatomal  - antecedent trauma, prior spinal surgery: no prior trauma, no prior spinal surgery, bilateral shoulder surgery  - pertinent negatives: No fever, No chills, No weight loss, No bladder dysfunction, No bowel dysfunction, No saddle anesthesia  - pertinent positives: generalized nonspecific Upper Extremity weakness bilaterally    - medications, other therapies tried (physical therapy, injections):     >> Norco, flexeril, gabapentin, Mobic, Topamax, Fioricet    >> Has previously undergone Physical Therapy - which he does not find helpful but does exercises at home    >> Has previously undergone spinal injection/s & trigger point injections        IMAGING / Labs / Studies (reviewed on 2/15/2018):      5-12-16 XR Lumbar and Thoracic:  Findings: There is slight levoscoliotic curvature of the lumbar spine.  There is grade 2 anterolisthesis of L5 on S1 with minimal grade 1 anterolisthesis of L4 on L5 and slight retrolisthesis of L3 on L4.  Moderate to severe disc height loss present at L5-S1 with mild disc height loss at L3-L4 and L4-L5. Bilateral facet arthropathy present at L4-L5 and L5-S1. No acute fractures visualized.  The remaining visualized osseous and soft tissue structures demonstrate no appreciable abnormality.    Findings: There is mild dextroscoliotic curvature of the thoracic spine.  There are multiple mild age-indeterminate compression deformities involving the mid and lower thoracic spine with suspected involvement of T6-T11.  There is mild multilevel disc height loss noted throughout the thoracic spine with associated disc  osteophyte complex production. Posterior elements appear grossly intact.   The remaining visualized osseous and soft tissue structures demonstrate no appreciable abnormality.         4/28/14 MRI Cervical Spine Without Contrast    Narrative Study:   Cervical spine MRI without contrast.04/28/14 11:09:00  Technique:  Multiplanar non contrast images obtained on the cervical spine.   History: Left neck pain.  Left shoulder pain.  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  No abnormal signal in the cord.  The craniocervical junction is normal.  C2-3: Facet hypertrophy on the left side with mild left sided neural foraminal narrowing  C3-4: Disk space narrowing.  Broad-based osteophyte disk foot.  Bilateral uncovertebral-joint disease with moderate severe neural frontal narrowing.  C4-5: Disk space narrowing.  Broad-based osteophyte disk foot.  Bilateral uncovertebral joint disease with mild to severe neural foraminal narrowing.  Decreased CSF spaces anterior-posterior to the cord.  C5-6: Broad-based osteophyte and disk bulge.  Bilateral uncovertebral joint disease with mild to moderate bilateral bony neural frontal narrowing greater on the left side.  Disk protrusion into the left lateral recess.  C6-7: Disk space narrowing.  Broad-based osteophyte and disk bulge bilateral uncovertebral joint disease with mild neural foraminal narrowing.  C7-T1: Mild to space narrowing.  Mild uncovertebral joint disease.  No significant neural frontal narrowing.         Review of Systems:  CONSTITUTIONAL: patient denies any fever, chills, or weight loss  SKIN: patient denies any rash or itching  RESPIRATORY: patient denies having any shortness of breath  GASTROINTESTINAL: patient denies having any diarrhea, constipation, or bowel incontinence  GENITOURINARY: patient denies having any abnormal bladder function    MUSCULOSKELETAL:  - patient complains of the above noted pain/s (see chief pain complaint)    NEUROLOGICAL:   - pain  "as above  - strength in Upper extremities is decreased, BILATERALLY  - sensation in Upper extremities is abnormal, BILATERALLY  - patient denies any loss of bowel or bladder control      PSYCHIATRIC: patient denies any change in mood      Physical Exam:  Vitals:  BP (!) 158/87 (BP Location: Right arm, Patient Position: Sitting, BP Method: Large (Automatic))   Pulse 83   Resp 18   Ht 5' 11" (1.803 m)   Wt 90.7 kg (200 lb)   BMI 27.89 kg/m²    (reviewed on 2/15/2018)    General: alert and oriented  Gait: normal gait  Skin: No rashes, No discoloration, No obvious lesions  HEENT: EOMI  Respiratory: respirations nonlabored    Musculoskeletal :  - Any pain on flexion, extension, rotation:    >> pain on flexion, extension, and rotation of cervical spine  - Any tenderness to palpation across paraspinal muscles, joints, bursae:     >> tender to palpation across cervical paraspinals, bilateral Upper Trapezius, Bilateral Middle Trapezius, Bilateral Levator Scapulae, Bilateral Rhomboids    Neuro:  - Extremity Strength:     >> LEFT :: 5/5    >> RIGHT :: 5/5    - Luciano's sign:     >> LEFT :: negative    >> RIGHT :: negative      Psych:  Mood and affect is appropriate      Assessment:  Cervical Spondylosis  Myofascial Pain  Cervical DDD    Plan:  - Patient returns for follow-up. He has widespread joint pain, including cervical facet pain along with myofascial pain.   - Will administer trigger point injections today. Procedure note below. He will have to wait a couple months for next injections.  - Refill Norco 7.5 mg TID PRN x 1 month, which he reports is helping his pain. He will see Dr. Malik next month to establish care.  - LA  reviewed and appropriate. He last filled from Dr. Nunes on 1-13-18.  - Last UDS was appropriate.  - I discussed the risks, benefits, and alternatives to potential treatment options. All questions and concerns were fully addressed today in clinic. Dr. Malik was consulted regarding the patient " plan and agrees.              >> Pain Disability Index:  3/1/2017 :: 59  5/24/2017 :: 70    >>UDS:  8-11-16 :: appropriate (+hydrocodone metabolites, +barbituates)  1/3/2017 :: appropriate  5/24/2017 :: appropriate  11/17/17 :: appropriate    >>Opioid Risk Tool:  1/3/2017 :: 6        Procedures:  Procedure: Trigger point injections  Muscle/s injected: Bilateral Upper Trapezius, Bilateral Middle Trapezius, Bilateral Levator Scapulae, Bilateral Rhomboids, Cervical Paraspinals  Side: Bilateral    Description of Procedure:  Prior to starting this procedure, risks, benefits, complications, and alternatives were discussed with the patient. The patient agreed to proceed with the procedure. The procedural sites were identified, marked, and widely prepped with ChloraPrep.   A 25-gauge 1.5 inch needle was introduced into the above noted muscle/s. Following negative aspiration, a volume of 1 to 1.5 mL of a solution comprised of 9 mL of 1% Lidocaine, and 1 mL of Depo-Medrol (40 mg/mL) was injected. No pain or paresthesia was noted on injection. This process was repeated at multiple sites throughout the above noted muscle/s until the above noted solution was exhausted. The patient tolerated the procedure well. The injection sites were cleaned and bandaged as needed.      Complications: None

## 2018-02-16 ENCOUNTER — OFFICE VISIT (OUTPATIENT)
Dept: FAMILY MEDICINE | Facility: CLINIC | Age: 63
End: 2018-02-16
Payer: MEDICAID

## 2018-02-16 VITALS
BODY MASS INDEX: 28.37 KG/M2 | HEIGHT: 71 IN | HEART RATE: 92 BPM | OXYGEN SATURATION: 96 % | DIASTOLIC BLOOD PRESSURE: 80 MMHG | WEIGHT: 202.63 LBS | TEMPERATURE: 98 F | SYSTOLIC BLOOD PRESSURE: 150 MMHG

## 2018-02-16 DIAGNOSIS — E78.5 HYPERLIPIDEMIA, UNSPECIFIED HYPERLIPIDEMIA TYPE: ICD-10-CM

## 2018-02-16 DIAGNOSIS — I10 HYPERTENSION NOT AT GOAL: ICD-10-CM

## 2018-02-16 DIAGNOSIS — F43.0 ACUTE STRESS REACTION: Primary | ICD-10-CM

## 2018-02-16 DIAGNOSIS — F43.21 GRIEF REACTION: ICD-10-CM

## 2018-02-16 PROCEDURE — 99214 OFFICE O/P EST MOD 30 MIN: CPT | Mod: S$PBB,,, | Performed by: FAMILY MEDICINE

## 2018-02-16 PROCEDURE — 99213 OFFICE O/P EST LOW 20 MIN: CPT | Mod: PBBFAC,PO | Performed by: FAMILY MEDICINE

## 2018-02-16 PROCEDURE — 3008F BODY MASS INDEX DOCD: CPT | Mod: ,,, | Performed by: FAMILY MEDICINE

## 2018-02-16 PROCEDURE — 99999 PR PBB SHADOW E&M-EST. PATIENT-LVL III: CPT | Mod: PBBFAC,,, | Performed by: FAMILY MEDICINE

## 2018-02-16 RX ORDER — CITALOPRAM 20 MG/1
20 TABLET, FILM COATED ORAL DAILY
Qty: 30 TABLET | Refills: 11 | Status: SHIPPED | OUTPATIENT
Start: 2018-02-16 | End: 2018-03-21

## 2018-02-16 RX ORDER — AMLODIPINE AND BENAZEPRIL HYDROCHLORIDE 5; 20 MG/1; MG/1
1 CAPSULE ORAL DAILY
Qty: 90 CAPSULE | Refills: 3 | Status: SHIPPED | OUTPATIENT
Start: 2018-02-16 | End: 2019-02-18 | Stop reason: SDUPTHER

## 2018-02-16 RX ORDER — LORAZEPAM 0.5 MG/1
0.5 TABLET ORAL EVERY 6 HOURS PRN
Qty: 21 TABLET | Refills: 0 | Status: SHIPPED | OUTPATIENT
Start: 2018-02-16 | End: 2018-03-21

## 2018-02-16 RX ORDER — ATORVASTATIN CALCIUM 40 MG/1
40 TABLET, FILM COATED ORAL DAILY
Qty: 30 TABLET | Refills: 11 | Status: SHIPPED | OUTPATIENT
Start: 2018-02-16 | End: 2018-05-24 | Stop reason: SDUPTHER

## 2018-02-16 NOTE — PROGRESS NOTES
Chief Complaint:    Chief Complaint   Patient presents with    Grief       History of Present Illness:    She presents today because his wife passed away sad and upset and crying.  Plus he is under a lot of stress capsule financial troubles.  Blood pressure is elevated several times.  Denies any chest pain shortness of breath.      ROS:  Review of Systems   Constitutional: Negative for activity change, chills, fatigue, fever and unexpected weight change.   HENT: Negative for congestion, ear discharge, ear pain, hearing loss, postnasal drip and rhinorrhea.    Eyes: Negative for pain and visual disturbance.   Respiratory: Negative for cough, chest tightness and shortness of breath.    Cardiovascular: Negative for chest pain and palpitations.   Gastrointestinal: Negative for abdominal pain, diarrhea and vomiting.   Endocrine: Negative for heat intolerance.   Genitourinary: Negative for dysuria, flank pain, frequency and hematuria.   Musculoskeletal: Negative for back pain, gait problem and neck pain.   Skin: Negative for color change and rash.   Neurological: Negative for dizziness, tremors, seizures, numbness and headaches.   Psychiatric/Behavioral: Negative for agitation, hallucinations, self-injury, sleep disturbance and suicidal ideas. The patient is not nervous/anxious.        Past Medical History:   Diagnosis Date    Arthritis     Brain aneurysm     Cancer 2012    PROSTATE    COPD (chronic obstructive pulmonary disease)     Headache(784.0)     Hypertension     ON MEDS       Social History:  Social History     Social History    Marital status: Single     Spouse name: N/A    Number of children: N/A    Years of education: N/A     Occupational History    retired/disabled      Social History Main Topics    Smoking status: Current Every Day Smoker     Packs/day: 0.25     Years: 40.00     Types: Cigarettes    Smokeless tobacco: Never Used      Comment: instructed not to smoke after midnight    Alcohol  "use No    Drug use: No    Sexual activity: Not Asked     Other Topics Concern    None     Social History Narrative    None       Family History:   family history includes Aneurysm in his paternal uncle; Heart disease in his brother, father, mother, and sister.    Health Maintenance   Topic Date Due    Zoster Vaccine  05/06/2015    Influenza Vaccine  08/01/2017    PROSTATE-SPECIFIC ANTIGEN  05/24/2018    Lipid Panel  05/24/2018    Pneumococcal PPSV23 (Medium Risk) (2) 05/06/2020    Colonoscopy  09/10/2023    TETANUS VACCINE  09/05/2024    Hepatitis C Screening  Completed       Physical Exam:    Vital Signs  Temp: 97.8 °F (36.6 °C)  Temp src: Tympanic  Pulse: 92  SpO2: 96 %  BP: (!) 150/80  BP Location: Left arm  Patient Position: Sitting  Pain Score: 0-No pain  Height and Weight  Height: 5' 11" (180.3 cm)  Weight: 91.9 kg (202 lb 9.6 oz)  BSA (Calculated - sq m): 2.15 sq meters  BMI (Calculated): 28.3  Weight in (lb) to have BMI = 25: 178.9]    Body mass index is 28.26 kg/m².    Physical Exam   Constitutional: He is oriented to person, place, and time. He appears well-developed.   HENT:   Mouth/Throat: Oropharynx is clear and moist.   Eyes: Conjunctivae are normal. Pupils are equal, round, and reactive to light.   Neck: Normal range of motion. Neck supple.   Cardiovascular: Normal rate, regular rhythm and normal heart sounds.    No murmur heard.  Pulmonary/Chest: Effort normal and breath sounds normal. No respiratory distress. He has no wheezes. He has no rales. He exhibits no tenderness.   Abdominal: Soft. He exhibits no distension and no mass. There is no tenderness. There is no guarding.   Musculoskeletal: He exhibits no edema or tenderness.   Lymphadenopathy:     He has no cervical adenopathy.   Neurological: He is alert and oriented to person, place, and time. He has normal reflexes.   Skin: Skin is warm and dry.   Psychiatric: He has a normal mood and affect. His behavior is normal. Judgment and " thought content normal.       Lab Results   Component Value Date    CHOL 139 05/24/2017    CHOL 177 05/03/2016    CHOL 160 10/02/2015    TRIG 143 05/24/2017    TRIG 106 05/03/2016    TRIG 88 10/02/2015    HDL 45 05/24/2017    HDL 65 05/03/2016    HDL 49 10/02/2015    TOTALCHOLEST 3.1 05/24/2017    TOTALCHOLEST 2.7 05/03/2016    TOTALCHOLEST 3.3 10/02/2015    NONHDLCHOL 94 05/24/2017    NONHDLCHOL 112 05/03/2016    NONHDLCHOL 111 10/02/2015       Lab Results   Component Value Date    HGBA1C 5.4 10/02/2015       Assessment:      ICD-10-CM ICD-9-CM   1. Acute stress reaction F43.0 308.9   2. Hyperlipidemia, unspecified hyperlipidemia type E78.5 272.4   3. Grief reaction F43.20 309.0   4. Hypertension not at goal I10 401.9         Plan:    Start him on Celexa 20 mg daily.  Also takes Ativan 0.5 mg by mouth every 6-8 hours as needed when necessary do not use Ativan on a regular basis because it is highly addictive and habit-forming.  Continue Lipitor  Follow-up in 3 weeks.    No orders of the defined types were placed in this encounter.      Current Outpatient Prescriptions   Medication Sig Dispense Refill    acetaminophen (TYLENOL) 650 MG TbSR Take 650 mg by mouth every 8 (eight) hours.      atorvastatin (LIPITOR) 40 MG tablet Take 1 tablet (40 mg total) by mouth once daily. 30 tablet 11    butalbital-acetaminophen-caffeine -40 mg (FIORICET, ESGIC) -40 mg per tablet TAKE 1 TABLET BY MOUTH EVERY 4 HOURS AS NEEDED FOR PAIN 30 tablet 0    fluticasone (FLONASE) 50 mcg/actuation nasal spray 2 sprays by Each Nare route once daily. 9.9 g 0    hydrocodone-acetaminophen 7.5-325mg (NORCO) 7.5-325 mg per tablet Take 1 tablet by mouth 3 (three) times daily as needed for Pain. 90 tablet 0    meloxicam (MOBIC) 15 MG tablet TAKE 1 TABLET(15 MG) BY MOUTH EVERY DAY 30 tablet 0    PROVENTIL HFA 90 mcg/actuation inhaler       topiramate (TOPAMAX) 100 MG tablet Take 1 tablet (100 mg total) by mouth once daily. 30 tablet  11    amlodipine-benazepril 5-20 mg (LOTREL) 5-20 mg per capsule Take 1 capsule by mouth once daily. 90 capsule 3    citalopram (CELEXA) 20 MG tablet Take 1 tablet (20 mg total) by mouth once daily. 30 tablet 11    diclofenac (VOLTAREN) 25 MG TbEC Take 1 tablet (25 mg total) by mouth daily as needed (pain). Take with food. Use for short duration for flareup. Do not take everyday. 30 tablet 1    LORazepam (ATIVAN) 0.5 MG tablet Take 1 tablet (0.5 mg total) by mouth every 6 (six) hours as needed for Anxiety. 21 tablet 0     No current facility-administered medications for this visit.        Medications Discontinued During This Encounter   Medication Reason    amLODIPine (NORVASC) 5 MG tablet Duplicate Order    cyclobenzaprine (FLEXERIL) 10 MG tablet Patient no longer taking    ketorolac 0.5% (ACULAR) 0.5 % Drop Patient no longer taking    omeprazole (PRILOSEC) 20 MG capsule Patient no longer taking    prednisoLONE acetate (PRED FORTE) 1 % DrpS Patient no longer taking    amlodipine (NORVASC) 5 MG tablet     atorvastatin (LIPITOR) 40 MG tablet Reorder       Follow-up in about 3 weeks (around 3/9/2018).      Tori Rosenberg MD

## 2018-03-02 RX ORDER — MELOXICAM 15 MG/1
TABLET ORAL
Qty: 30 TABLET | Refills: 0 | Status: SHIPPED | OUTPATIENT
Start: 2018-03-02 | End: 2018-04-13 | Stop reason: SDUPTHER

## 2018-03-19 ENCOUNTER — OFFICE VISIT (OUTPATIENT)
Dept: PAIN MEDICINE | Facility: CLINIC | Age: 63
End: 2018-03-19
Payer: MEDICAID

## 2018-03-19 VITALS
RESPIRATION RATE: 16 BRPM | HEART RATE: 84 BPM | WEIGHT: 202 LBS | SYSTOLIC BLOOD PRESSURE: 151 MMHG | DIASTOLIC BLOOD PRESSURE: 83 MMHG | BODY MASS INDEX: 28.28 KG/M2 | HEIGHT: 71 IN

## 2018-03-19 DIAGNOSIS — M79.641 PAIN IN BOTH HANDS: ICD-10-CM

## 2018-03-19 DIAGNOSIS — M79.18 MYOFASCIAL MUSCLE PAIN: ICD-10-CM

## 2018-03-19 DIAGNOSIS — M79.642 PAIN IN BOTH HANDS: ICD-10-CM

## 2018-03-19 DIAGNOSIS — M47.812 SPONDYLOSIS OF CERVICAL REGION WITHOUT MYELOPATHY OR RADICULOPATHY: Primary | ICD-10-CM

## 2018-03-19 DIAGNOSIS — M47.812 CERVICAL FACET JOINT SYNDROME: ICD-10-CM

## 2018-03-19 PROCEDURE — 99999 PR PBB SHADOW E&M-EST. PATIENT-LVL III: CPT | Mod: PBBFAC,,, | Performed by: ANESTHESIOLOGY

## 2018-03-19 PROCEDURE — 99214 OFFICE O/P EST MOD 30 MIN: CPT | Mod: S$PBB,,, | Performed by: ANESTHESIOLOGY

## 2018-03-19 PROCEDURE — 99213 OFFICE O/P EST LOW 20 MIN: CPT | Mod: PBBFAC,PO | Performed by: ANESTHESIOLOGY

## 2018-03-19 RX ORDER — HYDROCODONE BITARTRATE AND ACETAMINOPHEN 7.5; 325 MG/1; MG/1
1 TABLET ORAL EVERY 8 HOURS PRN
Qty: 90 TABLET | Refills: 0 | Status: SHIPPED | OUTPATIENT
Start: 2018-04-19 | End: 2018-04-18 | Stop reason: SDUPTHER

## 2018-03-19 RX ORDER — HYDROCODONE BITARTRATE AND ACETAMINOPHEN 7.5; 325 MG/1; MG/1
1 TABLET ORAL EVERY 8 HOURS PRN
Qty: 90 TABLET | Refills: 0 | Status: SHIPPED | OUTPATIENT
Start: 2018-03-19 | End: 2018-03-19 | Stop reason: SDUPTHER

## 2018-03-19 NOTE — PROGRESS NOTES
Chief Pain Complaint:  Neck Pain, Bilateral Arm Pain, Shoulder Pain     History of Present Illness:  This patient is a 62 y.o. male who presents today complaining of the above noted pain/s. The patient describes this pain as follows.    - duration of pain: pain for years   - timing: constant   - character: sharp, aching  - radiating, dermatomal: extends to the shoulders b/l, nondermatomal  - antecedent trauma, prior spinal surgery: no prior trauma, no prior spinal surgery, bilateral shoulder surgery  - pertinent negatives: No fever, No chills, No weight loss, No bladder dysfunction, No bowel dysfunction, No saddle anesthesia  - pertinent positives: generalized nonspecific Upper Extremity weakness bilaterally    - medications, other therapies tried (physical therapy, injections):     >> Norco, flexeril, gabapentin, Mobic, Topamax, Fioricet    >> Has previously undergone Physical Therapy - which he does not find helpful but does exercises at home    >> Has previously undergone spinal injection/s & trigger point injections        IMAGING / Labs / Studies (reviewed on 3/19/2018):      5-12-16 XR Lumbar and Thoracic:  Findings: There is slight levoscoliotic curvature of the lumbar spine.  There is grade 2 anterolisthesis of L5 on S1 with minimal grade 1 anterolisthesis of L4 on L5 and slight retrolisthesis of L3 on L4.  Moderate to severe disc height loss present at L5-S1 with mild disc height loss at L3-L4 and L4-L5. Bilateral facet arthropathy present at L4-L5 and L5-S1. No acute fractures visualized.  The remaining visualized osseous and soft tissue structures demonstrate no appreciable abnormality.    Findings: There is mild dextroscoliotic curvature of the thoracic spine.  There are multiple mild age-indeterminate compression deformities involving the mid and lower thoracic spine with suspected involvement of T6-T11.  There is mild multilevel disc height loss noted throughout the thoracic spine with associated disc  osteophyte complex production. Posterior elements appear grossly intact.   The remaining visualized osseous and soft tissue structures demonstrate no appreciable abnormality.         4/28/14 MRI Cervical Spine Without Contrast    Narrative Study:   Cervical spine MRI without contrast.04/28/14 11:09:00  Technique:  Multiplanar non contrast images obtained on the cervical spine.   History: Left neck pain.  Left shoulder pain.  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  No abnormal signal in the cord.  The craniocervical junction is normal.  C2-3: Facet hypertrophy on the left side with mild left sided neural foraminal narrowing  C3-4: Disk space narrowing.  Broad-based osteophyte disk foot.  Bilateral uncovertebral-joint disease with moderate severe neural frontal narrowing.  C4-5: Disk space narrowing.  Broad-based osteophyte disk foot.  Bilateral uncovertebral joint disease with mild to severe neural foraminal narrowing.  Decreased CSF spaces anterior-posterior to the cord.  C5-6: Broad-based osteophyte and disk bulge.  Bilateral uncovertebral joint disease with mild to moderate bilateral bony neural frontal narrowing greater on the left side.  Disk protrusion into the left lateral recess.  C6-7: Disk space narrowing.  Broad-based osteophyte and disk bulge bilateral uncovertebral joint disease with mild neural foraminal narrowing.  C7-T1: Mild to space narrowing.  Mild uncovertebral joint disease.  No significant neural frontal narrowing.         Review of Systems:  CONSTITUTIONAL: patient denies any fever, chills, or weight loss  SKIN: patient denies any rash or itching  RESPIRATORY: patient denies having any shortness of breath  GASTROINTESTINAL: patient denies having any diarrhea, constipation, or bowel incontinence  GENITOURINARY: patient denies having any abnormal bladder function    MUSCULOSKELETAL:  - patient complains of the above noted pain/s (see chief pain complaint)    NEUROLOGICAL:   - pain  "as above  - strength in Upper extremities is decreased, BILATERALLY  - sensation in Upper extremities is abnormal, BILATERALLY  - patient denies any loss of bowel or bladder control      PSYCHIATRIC: patient denies any change in mood    Physical Exam:  BP (!) 151/83 (BP Location: Right arm, Patient Position: Sitting, BP Method: Medium (Automatic))   Pulse 84   Resp 16   Ht 5' 11" (1.803 m)   Wt 91.6 kg (202 lb)   BMI 28.17 kg/m²  (reviewed on 3/19/2018)  General: Alert and oriented, in no apparent distress.  Gait: normal gait.  Skin: No rashes, No discoloration, No obvious lesions  HEENT: Normocephalic, atraumatic. Pupils equal and round.  Cardiovascular: Regular rate and rhythm , no significant peripheral edema present  Respiratory: Without audible wheezing, without use of accessory muscles of respiration.    Musculoskeletal:    Cervical Spine    - Pain on flexion of cervical spine Absent  - Spurling's Test:  Absent    - Pain on extension of cervical spine Present  - TTP over the cervical facet joints Present  - Cervical facet loading Present    -TTP over bilateral paracervical and trapezius muscles.       Neuro:    Strength:  UE R/L: D: 5/5; B: 5/5; T: 5/5; WF: 5/5; WE: 5/5; IO: 5/5;    Extremity Reflexes: Brisk and symmetric throughout.      Extremity Sensory: Sensation to pinprick and temperature symmetric. Proprioception intact.      Psych:  Mood and affect is appropriate      Assessment:    John Laguna is a 62 y.o. year old male who is presenting with     Encounter Diagnoses   Name Primary?    Spondylosis of cervical region without myelopathy or radiculopathy Yes    Cervical facet joint syndrome     Myofascial muscle pain     Pain in both hands        Plan:    1. Interventional: TPI of paracervical muscles in 2 months.    2. Pharmacologic: Continue Norco 7.5/325 mg Po TID PRN (90 tabs) x 1 refill.  checked. Opioid contract signed. Had an extensive discussion with patient regarding the side " effects, misuse/addiction potential from these medication. Patient with prior  Will wean down to 60 tabs per month at the next visit. Continue Tylenol and Meloxicam PRN.     3. Rehabilitative: Encourage home neck exercises.     4. Diagnostic: None for now.    5. Follow up: Follow-up in about 8 weeks (around 5/14/2018).      >> Pain Disability Index:  3/1/2017 :: 59  5/24/2017 :: 70    >>UDS:  8-11-16 :: appropriate (+hydrocodone metabolites, +barbituates)  1/3/2017 :: appropriate  5/24/2017 :: appropriate  11/17/17 :: appropriate    >>Opioid Risk Tool:  1/3/2017 :: 6    20 minutes were spent in this encounter with more than 50% of the time used for counseling and review of the plan.  Imaging / studies reviewed, detailed above.  I discussed in detail the risks, benefits, and alternatives to any and all potential treatment options.  All questions and concerns were fully addressed today in clinic. Medical decision making moderate.    Thank you for the opportunity to assist in the care of this patient.    Best wishes,    Signed:    Karan Malik MD          Disclaimer:  This note may have been prepared using voice recognition software, it may have not been extensively proofed, as such there could be errors within the text such as sound alike errors.

## 2018-03-21 ENCOUNTER — LAB VISIT (OUTPATIENT)
Dept: LAB | Facility: HOSPITAL | Age: 63
End: 2018-03-21
Attending: FAMILY MEDICINE
Payer: MEDICAID

## 2018-03-21 ENCOUNTER — OFFICE VISIT (OUTPATIENT)
Dept: FAMILY MEDICINE | Facility: CLINIC | Age: 63
End: 2018-03-21
Payer: MEDICAID

## 2018-03-21 VITALS
OXYGEN SATURATION: 98 % | BODY MASS INDEX: 28.31 KG/M2 | WEIGHT: 202.25 LBS | SYSTOLIC BLOOD PRESSURE: 134 MMHG | HEIGHT: 71 IN | TEMPERATURE: 98 F | DIASTOLIC BLOOD PRESSURE: 72 MMHG | HEART RATE: 80 BPM

## 2018-03-21 DIAGNOSIS — I10 ESSENTIAL HYPERTENSION: Primary | ICD-10-CM

## 2018-03-21 DIAGNOSIS — R53.83 FATIGUE, UNSPECIFIED TYPE: ICD-10-CM

## 2018-03-21 DIAGNOSIS — Z72.0 TOBACCO USE: ICD-10-CM

## 2018-03-21 DIAGNOSIS — Z71.6 ENCOUNTER FOR SMOKING CESSATION COUNSELING: ICD-10-CM

## 2018-03-21 LAB
ALBUMIN SERPL BCP-MCNC: 3.9 G/DL
ALP SERPL-CCNC: 96 U/L
ALT SERPL W/O P-5'-P-CCNC: 17 U/L
ANION GAP SERPL CALC-SCNC: 7 MMOL/L
AST SERPL-CCNC: 21 U/L
BASOPHILS # BLD AUTO: 0.12 K/UL
BASOPHILS NFR BLD: 2.5 %
BILIRUB SERPL-MCNC: 0.7 MG/DL
BUN SERPL-MCNC: 15 MG/DL
CALCIUM SERPL-MCNC: 8.7 MG/DL
CHLORIDE SERPL-SCNC: 110 MMOL/L
CO2 SERPL-SCNC: 22 MMOL/L
CREAT SERPL-MCNC: 0.8 MG/DL
DIFFERENTIAL METHOD: ABNORMAL
EOSINOPHIL # BLD AUTO: 0.1 K/UL
EOSINOPHIL NFR BLD: 1.8 %
ERYTHROCYTE [DISTWIDTH] IN BLOOD BY AUTOMATED COUNT: 13 %
EST. GFR  (AFRICAN AMERICAN): >60 ML/MIN/1.73 M^2
EST. GFR  (NON AFRICAN AMERICAN): >60 ML/MIN/1.73 M^2
ESTIMATED AVG GLUCOSE: 91 MG/DL
GLUCOSE SERPL-MCNC: 102 MG/DL
HBA1C MFR BLD HPLC: 4.8 %
HCT VFR BLD AUTO: 44.5 %
HGB BLD-MCNC: 14.8 G/DL
IMM GRANULOCYTES # BLD AUTO: 0.02 K/UL
IMM GRANULOCYTES NFR BLD AUTO: 0.4 %
LYMPHOCYTES # BLD AUTO: 1.2 K/UL
LYMPHOCYTES NFR BLD: 24.2 %
MCH RBC QN AUTO: 33.4 PG
MCHC RBC AUTO-ENTMCNC: 33.3 G/DL
MCV RBC AUTO: 101 FL
MONOCYTES # BLD AUTO: 0.4 K/UL
MONOCYTES NFR BLD: 9 %
NEUTROPHILS # BLD AUTO: 3 K/UL
NEUTROPHILS NFR BLD: 62.1 %
NRBC BLD-RTO: 0 /100 WBC
PLATELET # BLD AUTO: 232 K/UL
PMV BLD AUTO: 10.2 FL
POTASSIUM SERPL-SCNC: 4.2 MMOL/L
PROT SERPL-MCNC: 6.7 G/DL
RBC # BLD AUTO: 4.43 M/UL
SODIUM SERPL-SCNC: 139 MMOL/L
TSH SERPL DL<=0.005 MIU/L-ACNC: 2.69 UIU/ML
VIT B12 SERPL-MCNC: 338 PG/ML
WBC # BLD AUTO: 4.88 K/UL

## 2018-03-21 PROCEDURE — 83036 HEMOGLOBIN GLYCOSYLATED A1C: CPT

## 2018-03-21 PROCEDURE — 84443 ASSAY THYROID STIM HORMONE: CPT

## 2018-03-21 PROCEDURE — 99214 OFFICE O/P EST MOD 30 MIN: CPT | Mod: 25,S$PBB,, | Performed by: FAMILY MEDICINE

## 2018-03-21 PROCEDURE — 80053 COMPREHEN METABOLIC PANEL: CPT

## 2018-03-21 PROCEDURE — 99213 OFFICE O/P EST LOW 20 MIN: CPT | Mod: PBBFAC,PO | Performed by: FAMILY MEDICINE

## 2018-03-21 PROCEDURE — 85025 COMPLETE CBC W/AUTO DIFF WBC: CPT

## 2018-03-21 PROCEDURE — 82607 VITAMIN B-12: CPT

## 2018-03-21 PROCEDURE — 36415 COLL VENOUS BLD VENIPUNCTURE: CPT | Mod: PO

## 2018-03-21 PROCEDURE — 99999 PR PBB SHADOW E&M-EST. PATIENT-LVL III: CPT | Mod: PBBFAC,,, | Performed by: FAMILY MEDICINE

## 2018-03-21 NOTE — PROGRESS NOTES
Chief Complaint:    Chief Complaint   Patient presents with    Follow-up       History of Present Illness:  Is here for follow-up,  Is doing okay he tried Celexa for stress but he gave him side effects.  He quit using it.  He was also given Ativan which she did not like.  His blood pressures normal  He complains of occasional fatigue he still continues to smoke he has been counseled about stopping smoking      ROS:  Review of Systems   Constitutional: Negative for activity change, chills, fatigue, fever and unexpected weight change.   HENT: Negative for congestion, ear discharge, ear pain, hearing loss, postnasal drip and rhinorrhea.    Eyes: Negative for pain and visual disturbance.   Respiratory: Negative for cough, chest tightness and shortness of breath.    Cardiovascular: Negative for chest pain and palpitations.   Gastrointestinal: Negative for abdominal pain, diarrhea and vomiting.   Endocrine: Negative for heat intolerance.   Genitourinary: Negative for dysuria, flank pain, frequency and hematuria.   Musculoskeletal: Negative for back pain, gait problem and neck pain.   Skin: Negative for color change and rash.   Neurological: Negative for dizziness, tremors, seizures, numbness and headaches.   Psychiatric/Behavioral: Negative for agitation, hallucinations, self-injury, sleep disturbance and suicidal ideas. The patient is not nervous/anxious.        Past Medical History:   Diagnosis Date    Arthritis     Brain aneurysm     Cancer 2012    PROSTATE    COPD (chronic obstructive pulmonary disease)     Headache(784.0)     Hypertension     ON MEDS       Social History:  Social History     Social History    Marital status: Single     Spouse name: N/A    Number of children: N/A    Years of education: N/A     Occupational History    retired/disabled      Social History Main Topics    Smoking status: Current Every Day Smoker     Packs/day: 0.25     Years: 40.00     Types: Cigarettes    Smokeless  "tobacco: Never Used      Comment: instructed not to smoke after midnight    Alcohol use No    Drug use: No    Sexual activity: Not Asked     Other Topics Concern    None     Social History Narrative    None       Family History:   family history includes Aneurysm in his paternal uncle; Heart disease in his brother, father, mother, and sister.    Health Maintenance   Topic Date Due    Zoster Vaccine  05/06/2015    Influenza Vaccine  08/01/2017    PROSTATE-SPECIFIC ANTIGEN  05/24/2018    Lipid Panel  05/24/2018    Pneumococcal PPSV23 (Medium Risk) (2) 05/06/2020    Colonoscopy  09/10/2023    TETANUS VACCINE  09/05/2024    Hepatitis C Screening  Completed       Physical Exam:    Vital Signs  Temp: 97.9 °F (36.6 °C)  Temp src: Tympanic  Pulse: 80  SpO2: 98 %  BP: 134/72  BP Location: Left arm  Patient Position: Sitting  Pain Score:   9  Pain Loc: Shoulder  Height and Weight  Height: 5' 11" (180.3 cm)  Weight: 91.7 kg (202 lb 4.4 oz)  BSA (Calculated - sq m): 2.14 sq meters  BMI (Calculated): 28.3  Weight in (lb) to have BMI = 25: 178.9]    Body mass index is 28.21 kg/m².    Physical Exam   Constitutional: He is oriented to person, place, and time. He appears well-developed.   HENT:   Mouth/Throat: Oropharynx is clear and moist.   Eyes: Conjunctivae are normal. Pupils are equal, round, and reactive to light.   Neck: Normal range of motion. Neck supple.   Cardiovascular: Normal rate, regular rhythm and normal heart sounds.    No murmur heard.  Pulmonary/Chest: Effort normal and breath sounds normal. No respiratory distress. He has no wheezes. He has no rales. He exhibits no tenderness.   Abdominal: Soft. He exhibits no distension and no mass. There is no tenderness. There is no guarding.   Musculoskeletal: He exhibits no edema or tenderness.   Lymphadenopathy:     He has no cervical adenopathy.   Neurological: He is alert and oriented to person, place, and time. He has normal reflexes.   Skin: Skin is warm " and dry.   Psychiatric: He has a normal mood and affect. His behavior is normal. Judgment and thought content normal.       Lab Results   Component Value Date    CHOL 139 05/24/2017    CHOL 177 05/03/2016    CHOL 160 10/02/2015    TRIG 143 05/24/2017    TRIG 106 05/03/2016    TRIG 88 10/02/2015    HDL 45 05/24/2017    HDL 65 05/03/2016    HDL 49 10/02/2015    TOTALCHOLEST 3.1 05/24/2017    TOTALCHOLEST 2.7 05/03/2016    TOTALCHOLEST 3.3 10/02/2015    NONHDLCHOL 94 05/24/2017    NONHDLCHOL 112 05/03/2016    NONHDLCHOL 111 10/02/2015       Lab Results   Component Value Date    HGBA1C 5.4 10/02/2015       Assessment:      ICD-10-CM ICD-9-CM   1. Essential hypertension I10 401.9   2. Fatigue, unspecified type R53.83 780.79   3. Tobacco use Z72.0 305.1   4. Encounter for smoking cessation counseling Z71.6 V65.42    Z72.0 305.1         Plan:    Patient is handling stress reaction pretty well and does not need medication at this time.  Please monitor home blood pressure readings carefully  We'll check labs as below to evaluate for fatigue causes which is only episodic in nature.  He has been advised on smoking cessation he does not want to take any medication suggested the use of nicotine patches discussed the hazards of smoking he understood.  Spent about 5-6 minutes counseling on smoking.  Follow-up 6 months    Orders Placed This Encounter   Procedures    Hemoglobin A1c    Comprehensive metabolic panel    CBC auto differential    TSH    Vitamin B12       Current Outpatient Prescriptions   Medication Sig Dispense Refill    acetaminophen (TYLENOL) 650 MG TbSR Take 650 mg by mouth every 8 (eight) hours.      amlodipine-benazepril 5-20 mg (LOTREL) 5-20 mg per capsule Take 1 capsule by mouth once daily. 90 capsule 3    atorvastatin (LIPITOR) 40 MG tablet Take 1 tablet (40 mg total) by mouth once daily. 30 tablet 11    butalbital-acetaminophen-caffeine -40 mg (FIORICET, ESGIC) -40 mg per tablet TAKE 1  TABLET BY MOUTH EVERY 4 HOURS AS NEEDED FOR PAIN 30 tablet 0    fluticasone (FLONASE) 50 mcg/actuation nasal spray 2 sprays by Each Nare route once daily. 9.9 g 0    [START ON 4/19/2018] hydrocodone-acetaminophen 7.5-325mg (NORCO) 7.5-325 mg per tablet Take 1 tablet by mouth every 8 (eight) hours as needed for Pain. 90 tablet 0    meloxicam (MOBIC) 15 MG tablet TAKE 1 TABLET(15 MG) BY MOUTH EVERY DAY 30 tablet 0    PROVENTIL HFA 90 mcg/actuation inhaler       topiramate (TOPAMAX) 100 MG tablet Take 1 tablet (100 mg total) by mouth once daily. 30 tablet 11     No current facility-administered medications for this visit.        Medications Discontinued During This Encounter   Medication Reason    citalopram (CELEXA) 20 MG tablet Patient no longer taking    diclofenac (VOLTAREN) 25 MG TbEC Patient no longer taking    LORazepam (ATIVAN) 0.5 MG tablet Patient no longer taking       Follow-up in about 6 months (around 9/21/2018).      Tori Rosenberg MD

## 2018-04-13 RX ORDER — MELOXICAM 15 MG/1
TABLET ORAL
Qty: 30 TABLET | Refills: 0 | Status: SHIPPED | OUTPATIENT
Start: 2018-04-13 | End: 2018-05-16 | Stop reason: SDUPTHER

## 2018-04-18 RX ORDER — HYDROCODONE BITARTRATE AND ACETAMINOPHEN 7.5; 325 MG/1; MG/1
1 TABLET ORAL EVERY 12 HOURS PRN
Qty: 60 TABLET | Refills: 0 | Status: SHIPPED | OUTPATIENT
Start: 2018-04-18 | End: 2018-05-17 | Stop reason: SDUPTHER

## 2018-05-16 RX ORDER — MELOXICAM 15 MG/1
TABLET ORAL
Qty: 30 TABLET | Refills: 0 | Status: SHIPPED | OUTPATIENT
Start: 2018-05-16 | End: 2018-07-21 | Stop reason: SDUPTHER

## 2018-05-17 RX ORDER — HYDROCODONE BITARTRATE AND ACETAMINOPHEN 7.5; 325 MG/1; MG/1
1 TABLET ORAL EVERY 12 HOURS PRN
Qty: 60 TABLET | Refills: 0 | Status: SHIPPED | OUTPATIENT
Start: 2018-05-17 | End: 2018-05-18 | Stop reason: SDUPTHER

## 2018-05-17 RX ORDER — HYDROCODONE BITARTRATE AND ACETAMINOPHEN 7.5; 325 MG/1; MG/1
1 TABLET ORAL EVERY 12 HOURS PRN
Qty: 60 TABLET | Refills: 0 | Status: SHIPPED | OUTPATIENT
Start: 2018-06-15 | End: 2018-05-18 | Stop reason: SDUPTHER

## 2018-05-17 NOTE — TELEPHONE ENCOUNTER
Patient's prescriptions were printed and signed by Dr. Malik prior to the patient's clinic appointment.

## 2018-05-18 ENCOUNTER — OFFICE VISIT (OUTPATIENT)
Dept: PAIN MEDICINE | Facility: CLINIC | Age: 63
End: 2018-05-18
Payer: MEDICAID

## 2018-05-18 VITALS
RESPIRATION RATE: 18 BRPM | DIASTOLIC BLOOD PRESSURE: 81 MMHG | BODY MASS INDEX: 28.28 KG/M2 | HEART RATE: 84 BPM | HEIGHT: 71 IN | WEIGHT: 202 LBS | SYSTOLIC BLOOD PRESSURE: 145 MMHG

## 2018-05-18 DIAGNOSIS — M79.642 PAIN IN BOTH HANDS: ICD-10-CM

## 2018-05-18 DIAGNOSIS — M79.18 MYOFASCIAL MUSCLE PAIN: ICD-10-CM

## 2018-05-18 DIAGNOSIS — M47.812 SPONDYLOSIS OF CERVICAL REGION WITHOUT MYELOPATHY OR RADICULOPATHY: Primary | ICD-10-CM

## 2018-05-18 DIAGNOSIS — G89.4 CHRONIC PAIN DISORDER: ICD-10-CM

## 2018-05-18 DIAGNOSIS — M79.18 MYOFASCIAL PAIN: ICD-10-CM

## 2018-05-18 DIAGNOSIS — M79.641 PAIN IN BOTH HANDS: ICD-10-CM

## 2018-05-18 DIAGNOSIS — M50.30 DDD (DEGENERATIVE DISC DISEASE), CERVICAL: ICD-10-CM

## 2018-05-18 DIAGNOSIS — M47.812 CERVICAL FACET JOINT SYNDROME: ICD-10-CM

## 2018-05-18 PROCEDURE — 99214 OFFICE O/P EST MOD 30 MIN: CPT | Mod: 25,S$PBB,, | Performed by: PHYSICIAN ASSISTANT

## 2018-05-18 PROCEDURE — 20553 NJX 1/MLT TRIGGER POINTS 3/>: CPT | Mod: PBBFAC | Performed by: PHYSICIAN ASSISTANT

## 2018-05-18 PROCEDURE — 99999 PR PBB SHADOW E&M-EST. PATIENT-LVL IV: CPT | Mod: PBBFAC,,, | Performed by: PHYSICIAN ASSISTANT

## 2018-05-18 PROCEDURE — 20553 NJX 1/MLT TRIGGER POINTS 3/>: CPT | Mod: S$PBB,,, | Performed by: PHYSICIAN ASSISTANT

## 2018-05-18 PROCEDURE — 99214 OFFICE O/P EST MOD 30 MIN: CPT | Mod: PBBFAC | Performed by: PHYSICIAN ASSISTANT

## 2018-05-18 RX ORDER — METHYLPREDNISOLONE ACETATE 40 MG/ML
40 INJECTION, SUSPENSION INTRA-ARTICULAR; INTRALESIONAL; INTRAMUSCULAR; SOFT TISSUE ONCE
Status: COMPLETED | OUTPATIENT
Start: 2018-05-18 | End: 2018-05-18

## 2018-05-18 RX ORDER — HYDROCODONE BITARTRATE AND ACETAMINOPHEN 7.5; 325 MG/1; MG/1
1 TABLET ORAL EVERY 12 HOURS PRN
Qty: 60 TABLET | Refills: 0 | Status: SHIPPED | OUTPATIENT
Start: 2018-06-15 | End: 2018-07-12 | Stop reason: SDUPTHER

## 2018-05-18 RX ORDER — HYDROCODONE BITARTRATE AND ACETAMINOPHEN 7.5; 325 MG/1; MG/1
1 TABLET ORAL EVERY 12 HOURS PRN
Qty: 60 TABLET | Refills: 0 | Status: SHIPPED | OUTPATIENT
Start: 2018-05-18 | End: 2018-06-17

## 2018-05-18 RX ADMIN — METHYLPREDNISOLONE ACETATE 40 MG: 40 INJECTION, SUSPENSION INTRALESIONAL; INTRAMUSCULAR; INTRASYNOVIAL; SOFT TISSUE at 08:05

## 2018-05-18 NOTE — PROGRESS NOTES
Chief Pain Complaint:  Neck Pain, Bilateral Arm Pain, Shoulder Pain     History of Present Illness:  This patient is a 63 y.o. male who presents today complaining of the above noted pain/s. The patient describes this pain as follows.    - duration of pain: pain for years   - timing: constant   - character: sharp, aching  - radiating, dermatomal: extends to the shoulders b/l, nondermatomal  - antecedent trauma, prior spinal surgery: no prior trauma, no prior spinal surgery, bilateral shoulder surgery  - pertinent negatives: No fever, No chills, No weight loss, No bladder dysfunction, No bowel dysfunction, No saddle anesthesia  - pertinent positives: generalized nonspecific Upper Extremity weakness bilaterally    - medications, other therapies tried (physical therapy, injections):     >> Norco, flexeril, gabapentin, Mobic, Topamax, Fioricet    >> Has previously undergone Physical Therapy - which he does not find helpful but does exercises at home    >> Has previously undergone spinal injection/s & trigger point injections        IMAGING / Labs / Studies (reviewed on 5/18/2018):    5-12-16 XR Lumbar and Thoracic:  Findings: There is slight levoscoliotic curvature of the lumbar spine.  There is grade 2 anterolisthesis of L5 on S1 with minimal grade 1 anterolisthesis of L4 on L5 and slight retrolisthesis of L3 on L4.  Moderate to severe disc height loss present at L5-S1 with mild disc height loss at L3-L4 and L4-L5. Bilateral facet arthropathy present at L4-L5 and L5-S1. No acute fractures visualized.  The remaining visualized osseous and soft tissue structures demonstrate no appreciable abnormality.    Findings: There is mild dextroscoliotic curvature of the thoracic spine.  There are multiple mild age-indeterminate compression deformities involving the mid and lower thoracic spine with suspected involvement of T6-T11.  There is mild multilevel disc height loss noted throughout the thoracic spine with associated disc  osteophyte complex production. Posterior elements appear grossly intact.   The remaining visualized osseous and soft tissue structures demonstrate no appreciable abnormality.       4/28/14 MRI Cervical Spine Without Contrast    Narrative Study:   Cervical spine MRI without contrast.04/28/14 11:09:00  Technique:  Multiplanar non contrast images obtained on the cervical spine.   History: Left neck pain.  Left shoulder pain.  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  No abnormal signal in the cord.  The craniocervical junction is normal.  C2-3: Facet hypertrophy on the left side with mild left sided neural foraminal narrowing  C3-4: Disk space narrowing.  Broad-based osteophyte disk foot.  Bilateral uncovertebral-joint disease with moderate severe neural frontal narrowing.  C4-5: Disk space narrowing.  Broad-based osteophyte disk foot.  Bilateral uncovertebral joint disease with mild to severe neural foraminal narrowing.  Decreased CSF spaces anterior-posterior to the cord.  C5-6: Broad-based osteophyte and disk bulge.  Bilateral uncovertebral joint disease with mild to moderate bilateral bony neural frontal narrowing greater on the left side.  Disk protrusion into the left lateral recess.  C6-7: Disk space narrowing.  Broad-based osteophyte and disk bulge bilateral uncovertebral joint disease with mild neural foraminal narrowing.  C7-T1: Mild to space narrowing.  Mild uncovertebral joint disease.  No significant neural frontal narrowing.         Review of Systems:  CONSTITUTIONAL: patient denies any fever, chills, or weight loss  SKIN: patient denies any rash or itching  RESPIRATORY: patient denies having any shortness of breath  GASTROINTESTINAL: patient denies having any diarrhea, constipation, or bowel incontinence  GENITOURINARY: patient denies having any abnormal bladder function    MUSCULOSKELETAL:  - patient complains of the above noted pain/s (see chief pain complaint)    NEUROLOGICAL:   - pain as  "above  - strength in Upper extremities is decreased, BILATERALLY  - sensation in Upper extremities is abnormal, BILATERALLY  - patient denies any loss of bowel or bladder control      PSYCHIATRIC: patient denies any change in mood        Physical Exam:  Vitals:  BP (!) 145/81 (BP Location: Right arm, Patient Position: Sitting, BP Method: Medium (Automatic))   Pulse 84   Resp 18   Ht 5' 11" (1.803 m)   Wt 91.6 kg (202 lb)   BMI 28.17 kg/m²   (reviewed on 5/18/2018)    General: alert and oriented, in no apparent distress.  Gait: normal gait.  Skin: no rashes, no discoloration, no obvious lesions  HEENT: normocephalic, atraumatic. Pupils equal and round.  Cardiovascular: no significant peripheral edema present.  Respiratory: without use of accessory muscles of respiration.    Musculoskeletal - Cervical Spine:  - Pain on flexion of cervical spine: Absent   - Pain on extension of cervical spine: Present  - Cervical facet loading: Present  - TTP over the cervical facet joints: Present  - TTP over paracervical and trapezius muscles: Present bilaterally  - Spurling's: Negative    Neuro - Upper Extremities:  - BUE Strength:R/L: D: 5/5; B: 5/5; T: 5/5; WF: 5/5; WE: 5/5; IO: 5/5  - Extremity Reflexes: Brisk and symmetric throughout  - Sensory: Sensation to light touch intact bilaterally      Psych:  Mood and affect is appropriate          Assessment:  John Laguna is a 63 y.o. year old male who is presenting with     Encounter Diagnoses   Name Primary?    Spondylosis of cervical region without myelopathy or radiculopathy Yes    Cervical facet joint syndrome     Myofascial muscle pain     Pain in both hands     Myofascial pain     DDD (degenerative disc disease), cervical     Chronic pain disorder        Plan:  1. Interventional: TPI of paracervical muscles today. Procedure note below.    2. Pharmacologic:   - Refill Norco 7.5/325 mg Po BID PRN (60 tabs) x 2 months. Paper Rx given today. Patient tolerating " opioids with no side effects, obtaining good pain control with functional improvement. Dr. Malik previously had an extensive discussion with patient regarding the side effects, misuse/addiction potential from these medication.    - Continue Tylenol 500mg PRN and Meloxicam 15mg PRN.   - Opioid contract signed on 3/19/2018.     - LA  reviewed and appropriate. Last filled 4-18-18.    3. Rehabilitative: Encouraged regular exercise. Encourage home neck exercises.     4. Diagnostic: None for now.    5. Follow up: 8 weeks for med refill.     - I discussed the risks, benefits, and alternatives to potential treatment options. All questions and concerns were fully addressed today in clinic. Dr. Malik was consulted regarding the patient plan and agrees.             >> Pain Disability Index:  3/1/2017 :: 59  5/24/2017 :: 70    >>UDS:  8-11-16 :: appropriate (+hydrocodone metabolites, +barbituates)  1/3/2017 :: appropriate  5/24/2017 :: appropriate  11/17/17 :: appropriate    >>Opioid Risk Tool:  1/3/2017 :: 6        Procedures:  Procedure: Trigger point injections  Muscle/s injected: Bilateral Upper Trapezius, Bilateral Middle Trapezius, Bilateral Levator Scapulae, Bilateral Rhomboids   Side: Bilateral     Description of Procedure:  Prior to starting this procedure, risks, benefits, complications, and alternatives were discussed with the patient. The patient agreed to proceed with the procedure. The procedural sites were identified, marked, and widely prepped with ChloraPrep.   A 25-gauge 1.5 inch needle was introduced into the above noted muscle/s. Following negative aspiration, a volume of 1 to 1.5 mL of a solution comprised of 9 mL of 2% Lidocaine, and 1 mL of Depo-Medrol (40 mg/mL) was injected. No pain or paresthesia was noted on injection. This process was repeated at multiple sites throughout the above noted muscle/s until the above noted solution was exhausted. The patient tolerated the procedure well. The injection  sites were cleaned and bandaged as needed.      Complications: None

## 2018-05-24 DIAGNOSIS — E78.5 HYPERLIPIDEMIA, UNSPECIFIED HYPERLIPIDEMIA TYPE: ICD-10-CM

## 2018-05-24 RX ORDER — ATORVASTATIN CALCIUM 40 MG/1
40 TABLET, FILM COATED ORAL DAILY
Qty: 30 TABLET | Refills: 11 | Status: SHIPPED | OUTPATIENT
Start: 2018-05-24 | End: 2019-05-24

## 2018-07-11 ENCOUNTER — TELEPHONE (OUTPATIENT)
Dept: PAIN MEDICINE | Facility: CLINIC | Age: 63
End: 2018-07-11

## 2018-07-11 NOTE — TELEPHONE ENCOUNTER
----- Message from Luz Whyte sent at 7/11/2018  9:53 AM CDT -----  Contact: self [phone]  States that he has questions regarding his appt. Please call back at [phone]//thank you acc

## 2018-07-12 RX ORDER — HYDROCODONE BITARTRATE AND ACETAMINOPHEN 7.5; 325 MG/1; MG/1
1 TABLET ORAL EVERY 12 HOURS PRN
Qty: 60 TABLET | Refills: 0 | Status: SHIPPED | OUTPATIENT
Start: 2018-08-15 | End: 2018-07-13 | Stop reason: SDUPTHER

## 2018-07-12 RX ORDER — HYDROCODONE BITARTRATE AND ACETAMINOPHEN 7.5; 325 MG/1; MG/1
1 TABLET ORAL EVERY 12 HOURS PRN
Qty: 60 TABLET | Refills: 0 | Status: SHIPPED | OUTPATIENT
Start: 2018-07-16 | End: 2018-07-13 | Stop reason: SDUPTHER

## 2018-07-13 ENCOUNTER — OFFICE VISIT (OUTPATIENT)
Dept: PAIN MEDICINE | Facility: CLINIC | Age: 63
End: 2018-07-13
Payer: MEDICAID

## 2018-07-13 VITALS
SYSTOLIC BLOOD PRESSURE: 140 MMHG | BODY MASS INDEX: 28.28 KG/M2 | WEIGHT: 202 LBS | DIASTOLIC BLOOD PRESSURE: 88 MMHG | HEIGHT: 71 IN | HEART RATE: 96 BPM | RESPIRATION RATE: 18 BRPM

## 2018-07-13 DIAGNOSIS — M79.18 MYOFASCIAL PAIN: ICD-10-CM

## 2018-07-13 DIAGNOSIS — M47.812 SPONDYLOSIS OF CERVICAL REGION WITHOUT MYELOPATHY OR RADICULOPATHY: Primary | ICD-10-CM

## 2018-07-13 DIAGNOSIS — M79.641 PAIN IN BOTH HANDS: ICD-10-CM

## 2018-07-13 DIAGNOSIS — M79.18 MYOFASCIAL MUSCLE PAIN: ICD-10-CM

## 2018-07-13 DIAGNOSIS — M25.519 NECK AND SHOULDER PAIN: ICD-10-CM

## 2018-07-13 DIAGNOSIS — M54.2 NECK AND SHOULDER PAIN: ICD-10-CM

## 2018-07-13 DIAGNOSIS — M47.812 CERVICAL FACET JOINT SYNDROME: ICD-10-CM

## 2018-07-13 DIAGNOSIS — M79.642 PAIN IN BOTH HANDS: ICD-10-CM

## 2018-07-13 PROCEDURE — 99999 PR PBB SHADOW E&M-EST. PATIENT-LVL IV: CPT | Mod: PBBFAC,,, | Performed by: PHYSICIAN ASSISTANT

## 2018-07-13 PROCEDURE — 99214 OFFICE O/P EST MOD 30 MIN: CPT | Mod: PBBFAC | Performed by: PHYSICIAN ASSISTANT

## 2018-07-13 PROCEDURE — 99214 OFFICE O/P EST MOD 30 MIN: CPT | Mod: 25,S$PBB,, | Performed by: PHYSICIAN ASSISTANT

## 2018-07-13 PROCEDURE — 20553 NJX 1/MLT TRIGGER POINTS 3/>: CPT | Mod: PBBFAC | Performed by: PHYSICIAN ASSISTANT

## 2018-07-13 PROCEDURE — 20553 NJX 1/MLT TRIGGER POINTS 3/>: CPT | Mod: S$PBB,,, | Performed by: PHYSICIAN ASSISTANT

## 2018-07-13 RX ORDER — HYDROCODONE BITARTRATE AND ACETAMINOPHEN 7.5; 325 MG/1; MG/1
1 TABLET ORAL EVERY 12 HOURS PRN
Qty: 60 TABLET | Refills: 0 | Status: SHIPPED | OUTPATIENT
Start: 2018-07-16 | End: 2018-08-15

## 2018-07-13 RX ORDER — METHYLPREDNISOLONE ACETATE 40 MG/ML
40 INJECTION, SUSPENSION INTRA-ARTICULAR; INTRALESIONAL; INTRAMUSCULAR; SOFT TISSUE ONCE
Status: COMPLETED | OUTPATIENT
Start: 2018-07-13 | End: 2018-07-13

## 2018-07-13 RX ORDER — HYDROCODONE BITARTRATE AND ACETAMINOPHEN 7.5; 325 MG/1; MG/1
1 TABLET ORAL EVERY 12 HOURS PRN
Qty: 60 TABLET | Refills: 0 | Status: SHIPPED | OUTPATIENT
Start: 2018-08-15 | End: 2018-09-14

## 2018-07-13 RX ADMIN — METHYLPREDNISOLONE ACETATE 40 MG: 40 INJECTION, SUSPENSION INTRALESIONAL; INTRAMUSCULAR; INTRASYNOVIAL; SOFT TISSUE at 07:07

## 2018-07-13 NOTE — PROGRESS NOTES
Chief Pain Complaint:  Neck Pain, Bilateral Arm Pain, Shoulder Pain     History of Present Illness:  This patient is a 63 y.o. male who presents today complaining of the above noted pain/s. The patient describes this pain as follows.    - duration of pain: pain for years   - timing: constant   - character: sharp, aching  - radiating, dermatomal: extends to the shoulders b/l, nondermatomal  - antecedent trauma, prior spinal surgery: no prior trauma, no prior spinal surgery, bilateral shoulder surgery  - pertinent negatives: No fever, No chills, No weight loss, No bladder dysfunction, No bowel dysfunction, No saddle anesthesia  - pertinent positives: generalized nonspecific Upper Extremity weakness bilaterally    - medications, other therapies tried (physical therapy, injections):     >> Norco, flexeril, gabapentin, Mobic, Topamax, Fioricet    >> Has previously undergone Physical Therapy - which he does not find helpful but does exercises at home    >> Has previously undergone spinal injection/s & trigger point injections        IMAGING / Labs / Studies (reviewed on 7/13/2018):    5-12-16 XR Lumbar and Thoracic:  Findings: There is slight levoscoliotic curvature of the lumbar spine.  There is grade 2 anterolisthesis of L5 on S1 with minimal grade 1 anterolisthesis of L4 on L5 and slight retrolisthesis of L3 on L4.  Moderate to severe disc height loss present at L5-S1 with mild disc height loss at L3-L4 and L4-L5. Bilateral facet arthropathy present at L4-L5 and L5-S1. No acute fractures visualized.  The remaining visualized osseous and soft tissue structures demonstrate no appreciable abnormality.    Findings: There is mild dextroscoliotic curvature of the thoracic spine.  There are multiple mild age-indeterminate compression deformities involving the mid and lower thoracic spine with suspected involvement of T6-T11.  There is mild multilevel disc height loss noted throughout the thoracic spine with associated disc  osteophyte complex production. Posterior elements appear grossly intact.   The remaining visualized osseous and soft tissue structures demonstrate no appreciable abnormality.       4/28/14 MRI Cervical Spine Without Contrast    Narrative Study:   Cervical spine MRI without contrast.04/28/14 11:09:00  Technique:  Multiplanar non contrast images obtained on the cervical spine.   History: Left neck pain.  Left shoulder pain.  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  No abnormal signal in the cord.  The craniocervical junction is normal.  C2-3: Facet hypertrophy on the left side with mild left sided neural foraminal narrowing  C3-4: Disk space narrowing.  Broad-based osteophyte disk foot.  Bilateral uncovertebral-joint disease with moderate severe neural frontal narrowing.  C4-5: Disk space narrowing.  Broad-based osteophyte disk foot.  Bilateral uncovertebral joint disease with mild to severe neural foraminal narrowing.  Decreased CSF spaces anterior-posterior to the cord.  C5-6: Broad-based osteophyte and disk bulge.  Bilateral uncovertebral joint disease with mild to moderate bilateral bony neural frontal narrowing greater on the left side.  Disk protrusion into the left lateral recess.  C6-7: Disk space narrowing.  Broad-based osteophyte and disk bulge bilateral uncovertebral joint disease with mild neural foraminal narrowing.  C7-T1: Mild to space narrowing.  Mild uncovertebral joint disease.  No significant neural frontal narrowing.           Review of Systems:  CONSTITUTIONAL: patient denies any fever, chills, or weight loss  SKIN: patient denies any rash or itching  RESPIRATORY: patient denies having any shortness of breath  GASTROINTESTINAL: patient denies having any diarrhea, constipation, or bowel incontinence  GENITOURINARY: patient denies having any abnormal bladder function    MUSCULOSKELETAL:  - patient complains of the above noted pain/s (see chief pain complaint)    NEUROLOGICAL:   - pain  "as above  - strength in Upper extremities is decreased, BILATERALLY  - sensation in Upper extremities is abnormal, BILATERALLY  - patient denies any loss of bowel or bladder control      PSYCHIATRIC: patient denies any change in mood        Physical Exam:  Vitals:  BP (!) 140/88 (BP Location: Right arm, Patient Position: Sitting, BP Method: Medium (Automatic))   Pulse 96   Resp 18   Ht 5' 11" (1.803 m)   Wt 91.6 kg (202 lb)   BMI 28.17 kg/m²   (reviewed on 7/13/2018)    General: alert and oriented, in no apparent distress.  Gait: normal gait.  Skin: no rashes, no discoloration, no obvious lesions  HEENT: normocephalic, atraumatic. Pupils equal and round.  Cardiovascular: no significant peripheral edema present.  Respiratory: without use of accessory muscles of respiration.    Musculoskeletal - Cervical Spine:  - Pain on flexion of cervical spine: Absent   - Pain on extension of cervical spine: Present  - Cervical facet loading: Present  - TTP over the cervical facet joints: Present  - TTP over paracervical and trapezius muscles: Present bilaterally, tender to minimal palpation, R>L  - Spurling's: Negative    Neuro - Upper Extremities:  - BUE Strength:R/L: D: 5/5; B: 5/5; T: 5/5; WF: 5/5; WE: 5/5; IO: 5/5  - Extremity Reflexes: Brisk and symmetric throughout  - Sensory: Sensation to light touch intact bilaterally      Psych:  Mood and affect is appropriate          Assessment:  John Laguna is a 63 y.o. year old male who is presenting with     Encounter Diagnoses   Name Primary?    Spondylosis of cervical region without myelopathy or radiculopathy Yes    Cervical facet joint syndrome     Myofascial muscle pain     Pain in both hands     Myofascial pain     Neck and shoulder pain        Plan:  1. Interventional: TPI of paracervical / trapezius muscles today. Procedure note below.    2. Pharmacologic:   - Refill Norco 7.5/325 mg Po BID PRN (60 tabs) x 2 months. Paper Rx given today. Patient tolerating " opioids with no side effects, obtaining good pain control with functional improvement. Dr. Malik previously had an extensive discussion with patient regarding the side effects, misuse/addiction potential from these medication.    - Continue Tylenol 500mg PRN and Meloxicam 15mg PRN.   - Opioid contract signed on 3/19/2018.     - LA  reviewed and appropriate. Last filled 6-16-18.  - Will order UDS today to ensure medication compliance.      3. Rehabilitative: Encouraged regular exercise. Encourage home neck exercises.     4. Diagnostic: None for now.    5. Follow up: 8 weeks for med refill.     - I discussed the risks, benefits, and alternatives to potential treatment options. All questions and concerns were fully addressed today in clinic. Dr. Malik was consulted regarding the patient plan and agrees.           >>UDS:  8-11-16 :: appropriate (+hydrocodone metabolites, +barbituates)  1/3/2017 :: appropriate  5/24/2017 :: appropriate  11/17/17 :: appropriate  7/13/2018 :: pending    >>Opioid Risk Tool:  1/3/2017 :: 6        Procedures:  Procedure: Trigger point injections  Muscle/s injected: Bilateral Upper Trapezius, Bilateral Middle Trapezius, Bilateral Levator Scapulae, Bilateral Rhomboids   Side: Bilateral     Description of Procedure:  Prior to starting this procedure, risks, benefits, complications, and alternatives were discussed with the patient. The patient agreed to proceed with the procedure. The procedural sites were identified, marked, and widely prepped with ChloraPrep.   A 25-gauge 1.5 inch needle was introduced into the above noted muscle/s. Following negative aspiration, a volume of 1 to 1.5 mL of a solution comprised of 9 mL of 2% Lidocaine, and 1 mL of Depo-Medrol (40 mg/mL) was injected. No pain or paresthesia was noted on injection. This process was repeated at multiple sites throughout the above noted muscle/s until the above noted solution was exhausted. The patient tolerated the procedure  well. The injection sites were cleaned and bandaged as needed.      Complications: None

## 2018-07-21 RX ORDER — MELOXICAM 15 MG/1
TABLET ORAL
Qty: 30 TABLET | Refills: 0 | Status: SHIPPED | OUTPATIENT
Start: 2018-07-21 | End: 2018-08-23 | Stop reason: SDUPTHER

## 2018-08-14 DIAGNOSIS — J02.9 SORE THROAT: ICD-10-CM

## 2018-08-14 DIAGNOSIS — J01.01 ACUTE RECURRENT MAXILLARY SINUSITIS: ICD-10-CM

## 2018-08-14 DIAGNOSIS — J20.9 ACUTE BRONCHITIS, UNSPECIFIED ORGANISM: ICD-10-CM

## 2018-08-14 RX ORDER — FLUTICASONE PROPIONATE 50 MCG
SPRAY, SUSPENSION (ML) NASAL
Qty: 16 ML | Refills: 0 | Status: SHIPPED | OUTPATIENT
Start: 2018-08-14 | End: 2019-02-04 | Stop reason: SDUPTHER

## 2018-08-23 RX ORDER — MELOXICAM 15 MG/1
TABLET ORAL
Qty: 30 TABLET | Refills: 0 | Status: SHIPPED | OUTPATIENT
Start: 2018-08-23 | End: 2018-09-25 | Stop reason: SDUPTHER

## 2018-09-05 RX ORDER — HYDROCODONE BITARTRATE AND ACETAMINOPHEN 7.5; 325 MG/1; MG/1
1 TABLET ORAL EVERY 12 HOURS PRN
Qty: 60 TABLET | Refills: 0 | Status: SHIPPED | OUTPATIENT
Start: 2018-10-12 | End: 2018-11-13 | Stop reason: SDUPTHER

## 2018-09-05 RX ORDER — HYDROCODONE BITARTRATE AND ACETAMINOPHEN 7.5; 325 MG/1; MG/1
1 TABLET ORAL EVERY 12 HOURS PRN
Qty: 60 TABLET | Refills: 0 | Status: SHIPPED | OUTPATIENT
Start: 2018-09-13 | End: 2018-10-13

## 2018-09-10 ENCOUNTER — OFFICE VISIT (OUTPATIENT)
Dept: PAIN MEDICINE | Facility: CLINIC | Age: 63
End: 2018-09-10
Payer: MEDICAID

## 2018-09-10 VITALS
BODY MASS INDEX: 25.9 KG/M2 | DIASTOLIC BLOOD PRESSURE: 85 MMHG | HEIGHT: 71 IN | RESPIRATION RATE: 18 BRPM | HEART RATE: 88 BPM | WEIGHT: 185 LBS | SYSTOLIC BLOOD PRESSURE: 143 MMHG

## 2018-09-10 DIAGNOSIS — M47.812 SPONDYLOSIS OF CERVICAL REGION WITHOUT MYELOPATHY OR RADICULOPATHY: Primary | ICD-10-CM

## 2018-09-10 DIAGNOSIS — M79.18 MYOFASCIAL PAIN: ICD-10-CM

## 2018-09-10 DIAGNOSIS — M79.641 PAIN IN BOTH HANDS: ICD-10-CM

## 2018-09-10 DIAGNOSIS — M79.642 PAIN IN BOTH HANDS: ICD-10-CM

## 2018-09-10 DIAGNOSIS — M47.812 CERVICAL FACET JOINT SYNDROME: ICD-10-CM

## 2018-09-10 DIAGNOSIS — M79.18 MYOFASCIAL MUSCLE PAIN: ICD-10-CM

## 2018-09-10 PROCEDURE — 20553 NJX 1/MLT TRIGGER POINTS 3/>: CPT | Mod: S$PBB,,, | Performed by: PHYSICIAN ASSISTANT

## 2018-09-10 PROCEDURE — 99213 OFFICE O/P EST LOW 20 MIN: CPT | Mod: PBBFAC,25 | Performed by: PHYSICIAN ASSISTANT

## 2018-09-10 PROCEDURE — 99214 OFFICE O/P EST MOD 30 MIN: CPT | Mod: 25,S$PBB,, | Performed by: PHYSICIAN ASSISTANT

## 2018-09-10 PROCEDURE — 99999 PR PBB SHADOW E&M-EST. PATIENT-LVL III: CPT | Mod: PBBFAC,,, | Performed by: PHYSICIAN ASSISTANT

## 2018-09-10 PROCEDURE — 20553 NJX 1/MLT TRIGGER POINTS 3/>: CPT | Mod: PBBFAC | Performed by: PHYSICIAN ASSISTANT

## 2018-09-10 RX ORDER — METHYLPREDNISOLONE ACETATE 40 MG/ML
40 INJECTION, SUSPENSION INTRA-ARTICULAR; INTRALESIONAL; INTRAMUSCULAR; SOFT TISSUE ONCE
Status: COMPLETED | OUTPATIENT
Start: 2018-09-10 | End: 2018-09-10

## 2018-09-10 RX ADMIN — METHYLPREDNISOLONE ACETATE 40 MG: 40 INJECTION, SUSPENSION INTRALESIONAL; INTRAMUSCULAR; INTRASYNOVIAL; SOFT TISSUE at 08:09

## 2018-09-10 NOTE — PROGRESS NOTES
Chief Pain Complaint:  Neck Pain, Bilateral Arm Pain, Shoulder Pain     History of Present Illness:  This patient is a 63 y.o. male who presents today complaining of the above noted pain/s. The patient describes this pain as follows.    - duration of pain: pain for years   - timing: constant   - character: sharp, aching  - radiating, dermatomal: extends to the shoulders b/l, nondermatomal  - antecedent trauma, prior spinal surgery: no prior trauma, no prior spinal surgery, bilateral shoulder surgery  - pertinent negatives: No fever, No chills, No weight loss, No bladder dysfunction, No bowel dysfunction, No saddle anesthesia  - pertinent positives: generalized nonspecific Upper Extremity weakness bilaterally    - medications, other therapies tried (physical therapy, injections):     >> Norco, flexeril, gabapentin, Mobic, Topamax, Fioricet    >> Has previously undergone Physical Therapy - which he does not find helpful but does exercises at home    >> Has previously undergone spinal injection/s & trigger point injections          IMAGING / Labs / Studies (reviewed on 9/10/2018):    5-12-16 XR Lumbar and Thoracic:  Findings: There is slight levoscoliotic curvature of the lumbar spine.  There is grade 2 anterolisthesis of L5 on S1 with minimal grade 1 anterolisthesis of L4 on L5 and slight retrolisthesis of L3 on L4.  Moderate to severe disc height loss present at L5-S1 with mild disc height loss at L3-L4 and L4-L5. Bilateral facet arthropathy present at L4-L5 and L5-S1. No acute fractures visualized.  The remaining visualized osseous and soft tissue structures demonstrate no appreciable abnormality.    Findings: There is mild dextroscoliotic curvature of the thoracic spine.  There are multiple mild age-indeterminate compression deformities involving the mid and lower thoracic spine with suspected involvement of T6-T11.  There is mild multilevel disc height loss noted throughout the thoracic spine with associated disc  osteophyte complex production. Posterior elements appear grossly intact.   The remaining visualized osseous and soft tissue structures demonstrate no appreciable abnormality.       4/28/14 MRI Cervical Spine Without Contrast    Narrative Study:   Cervical spine MRI without contrast.04/28/14 11:09:00  Technique:  Multiplanar non contrast images obtained on the cervical spine.   History: Left neck pain.  Left shoulder pain.  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  No abnormal signal in the cord.  The craniocervical junction is normal.  C2-3: Facet hypertrophy on the left side with mild left sided neural foraminal narrowing  C3-4: Disk space narrowing.  Broad-based osteophyte disk foot.  Bilateral uncovertebral-joint disease with moderate severe neural frontal narrowing.  C4-5: Disk space narrowing.  Broad-based osteophyte disk foot.  Bilateral uncovertebral joint disease with mild to severe neural foraminal narrowing.  Decreased CSF spaces anterior-posterior to the cord.  C5-6: Broad-based osteophyte and disk bulge.  Bilateral uncovertebral joint disease with mild to moderate bilateral bony neural frontal narrowing greater on the left side.  Disk protrusion into the left lateral recess.  C6-7: Disk space narrowing.  Broad-based osteophyte and disk bulge bilateral uncovertebral joint disease with mild neural foraminal narrowing.  C7-T1: Mild to space narrowing.  Mild uncovertebral joint disease.  No significant neural frontal narrowing.           Review of Systems:  CONSTITUTIONAL: patient denies any fever, chills, or weight loss  SKIN: patient denies any rash or itching  RESPIRATORY: patient denies having any shortness of breath  GASTROINTESTINAL: patient denies having any diarrhea, constipation, or bowel incontinence  GENITOURINARY: patient denies having any abnormal bladder function    MUSCULOSKELETAL:  - patient complains of the above noted pain/s (see chief pain complaint)    NEUROLOGICAL:   - pain  "as above  - strength in Upper extremities is decreased, BILATERALLY  - sensation in Upper extremities is abnormal, BILATERALLY  - patient denies any loss of bowel or bladder control      PSYCHIATRIC: patient denies any change in mood           Physical Exam:  Vitals:  BP (!) 143/85 (BP Location: Right arm, Patient Position: Sitting, BP Method: Medium (Automatic))   Pulse 88   Resp 18   Ht 5' 11" (1.803 m)   Wt 83.9 kg (185 lb)   BMI 25.80 kg/m²   (reviewed on 9/10/2018)    General: alert and oriented, in no apparent distress.  Gait: normal gait.  Skin: no rashes, no discoloration, no obvious lesions  HEENT: normocephalic, atraumatic. Pupils equal and round.  Cardiovascular: no significant peripheral edema present.  Respiratory: without use of accessory muscles of respiration.    Musculoskeletal - Cervical Spine:  - Pain on extension of cervical spine: Present  - Cervical facet loading: Present  - TTP over the cervical facet joints: Present  - TTP over paracervical and trapezius muscles: Present bilaterally, tender to minimal palpation   - Spurling's: Negative    Neuro - Upper Extremities:  - BUE Strength:R/L: D: 5/5; B: 5/5; T: 5/5; WF: 5/5; WE: 5/5; IO: 5/5  - Extremity Reflexes: Brisk and symmetric throughout  - Sensory: Sensation to light touch intact bilaterally      Psych:  Mood and affect is appropriate          Assessment:  John Laguna is a 63 y.o. year old male who is presenting with     Encounter Diagnoses   Name Primary?    Spondylosis of cervical region without myelopathy or radiculopathy Yes    Cervical facet joint syndrome     Myofascial muscle pain     Pain in both hands     Myofascial pain        Plan:  1. Interventional: TPI of paracervical / trapezius muscles today. Procedure note below.    2. Pharmacologic:   - Refill Norco 7.5/325 mg Po BID PRN (60 tabs) x 2 months. Paper Rx given today. Patient tolerating opioids with no side effects, obtaining good pain control with functional " improvement. Dr. Malik previously had an extensive discussion with patient regarding the side effects, misuse/addiction potential from these medication.    - Continue Tylenol 500mg PRN and Meloxicam 15mg PRN.   - Opioid contract signed on 3/19/2018.     - [place]  reviewed and appropriate. Last filled 8-15-18.    3. Rehabilitative: Encouraged regular exercise. Encourage home neck exercises.     4. Diagnostic: None for now.    5. Follow up: 8 weeks for med refill.     - I discussed the risks, benefits, and alternatives to potential treatment options. All questions and concerns were fully addressed today in clinic. Dr. Malik was consulted regarding the patient plan and agrees.           >>UDS:  8-11-16 :: appropriate (+hydrocodone metabolites, +barbituates)  1/3/2017 :: appropriate  5/24/2017 :: appropriate  11/17/17 :: appropriate  7/13/2018 :: appropriate    >>Opioid Risk Tool:  1/3/2017 :: 6        Procedures:  Procedure: Trigger point injections  Muscle/s injected: Bilateral Upper Trapezius, Bilateral Middle Trapezius, Bilateral Levator Scapulae, Bilateral Rhomboids   Side: Bilateral     Description of Procedure:  Prior to starting this procedure, risks, benefits, complications, and alternatives were discussed with the patient. The patient agreed to proceed with the procedure. The procedural sites were identified, marked, and widely prepped with ChloraPrep.   A 25-gauge 1.5 inch needle was introduced into the above noted muscle/s. Following negative aspiration, a volume of 1 to 1.5 mL of a solution comprised of 9 mL of 2% Lidocaine, and 1 mL of Depo-Medrol (40 mg/mL) was injected. No pain or paresthesia was noted on injection. This process was repeated at multiple sites throughout the above noted muscle/s until the above noted solution was exhausted. The patient tolerated the procedure well. The injection sites were cleaned and bandaged as needed.      Complications: None

## 2018-09-11 DIAGNOSIS — J20.9 ACUTE BRONCHITIS, UNSPECIFIED ORGANISM: ICD-10-CM

## 2018-09-11 DIAGNOSIS — J02.9 SORE THROAT: ICD-10-CM

## 2018-09-11 DIAGNOSIS — J01.01 ACUTE RECURRENT MAXILLARY SINUSITIS: ICD-10-CM

## 2018-09-11 RX ORDER — FLUTICASONE PROPIONATE 50 MCG
SPRAY, SUSPENSION (ML) NASAL
Qty: 16 ML | Refills: 0 | OUTPATIENT
Start: 2018-09-11

## 2018-09-25 RX ORDER — MELOXICAM 15 MG/1
TABLET ORAL
Qty: 30 TABLET | Refills: 0 | Status: SHIPPED | OUTPATIENT
Start: 2018-09-25 | End: 2018-10-30 | Stop reason: SDUPTHER

## 2018-10-10 DIAGNOSIS — G43.119 INTRACTABLE MIGRAINE WITH AURA WITHOUT STATUS MIGRAINOSUS: ICD-10-CM

## 2018-10-10 RX ORDER — TOPIRAMATE 100 MG/1
TABLET, FILM COATED ORAL
Qty: 30 TABLET | Refills: 11 | Status: SHIPPED | OUTPATIENT
Start: 2018-10-10 | End: 2020-12-29 | Stop reason: SDUPTHER

## 2018-10-29 RX ORDER — HYDROCODONE BITARTRATE AND ACETAMINOPHEN 7.5; 325 MG/1; MG/1
1 TABLET ORAL EVERY 12 HOURS PRN
Qty: 60 TABLET | Refills: 0 | Status: SHIPPED | OUTPATIENT
Start: 2018-12-09 | End: 2018-10-30

## 2018-10-29 RX ORDER — HYDROCODONE BITARTRATE AND ACETAMINOPHEN 7.5; 325 MG/1; MG/1
1 TABLET ORAL EVERY 12 HOURS PRN
Qty: 60 TABLET | Refills: 0 | Status: SHIPPED | OUTPATIENT
Start: 2018-11-10 | End: 2018-10-30

## 2018-10-29 NOTE — TELEPHONE ENCOUNTER
----- Message from Demian Ferrera sent at 10/29/2018 12:39 PM CDT -----  Contact: John  [phone]  Pt needs to get another appt due to him having the flu.

## 2018-10-30 RX ORDER — MELOXICAM 15 MG/1
TABLET ORAL
Qty: 30 TABLET | Refills: 0 | Status: SHIPPED | OUTPATIENT
Start: 2018-10-30 | End: 2018-12-05 | Stop reason: SDUPTHER

## 2018-11-13 ENCOUNTER — OFFICE VISIT (OUTPATIENT)
Dept: PAIN MEDICINE | Facility: CLINIC | Age: 63
End: 2018-11-13
Payer: MEDICAID

## 2018-11-13 VITALS
HEIGHT: 71 IN | RESPIRATION RATE: 18 BRPM | HEART RATE: 105 BPM | BODY MASS INDEX: 25.9 KG/M2 | DIASTOLIC BLOOD PRESSURE: 86 MMHG | WEIGHT: 185 LBS | SYSTOLIC BLOOD PRESSURE: 150 MMHG

## 2018-11-13 DIAGNOSIS — M47.812 CERVICAL FACET JOINT SYNDROME: ICD-10-CM

## 2018-11-13 DIAGNOSIS — M25.519 NECK AND SHOULDER PAIN: ICD-10-CM

## 2018-11-13 DIAGNOSIS — M79.18 MYOFASCIAL PAIN: ICD-10-CM

## 2018-11-13 DIAGNOSIS — M54.2 NECK AND SHOULDER PAIN: ICD-10-CM

## 2018-11-13 DIAGNOSIS — M79.641 PAIN IN BOTH HANDS: ICD-10-CM

## 2018-11-13 DIAGNOSIS — M79.642 PAIN IN BOTH HANDS: ICD-10-CM

## 2018-11-13 DIAGNOSIS — M79.18 MYOFASCIAL MUSCLE PAIN: ICD-10-CM

## 2018-11-13 DIAGNOSIS — M47.812 SPONDYLOSIS OF CERVICAL REGION WITHOUT MYELOPATHY OR RADICULOPATHY: Primary | ICD-10-CM

## 2018-11-13 PROCEDURE — 20553 NJX 1/MLT TRIGGER POINTS 3/>: CPT | Mod: S$PBB,,, | Performed by: PHYSICIAN ASSISTANT

## 2018-11-13 PROCEDURE — 20553 NJX 1/MLT TRIGGER POINTS 3/>: CPT | Mod: PBBFAC | Performed by: PHYSICIAN ASSISTANT

## 2018-11-13 PROCEDURE — 99214 OFFICE O/P EST MOD 30 MIN: CPT | Mod: PBBFAC | Performed by: PHYSICIAN ASSISTANT

## 2018-11-13 PROCEDURE — 99214 OFFICE O/P EST MOD 30 MIN: CPT | Mod: 25,S$PBB,, | Performed by: PHYSICIAN ASSISTANT

## 2018-11-13 PROCEDURE — 99999 PR PBB SHADOW E&M-EST. PATIENT-LVL IV: CPT | Mod: PBBFAC,,, | Performed by: PHYSICIAN ASSISTANT

## 2018-11-13 RX ORDER — HYDROCODONE BITARTRATE AND ACETAMINOPHEN 7.5; 325 MG/1; MG/1
1 TABLET ORAL EVERY 12 HOURS PRN
Qty: 60 TABLET | Refills: 0 | Status: SHIPPED | OUTPATIENT
Start: 2018-12-12 | End: 2019-01-11

## 2018-11-13 RX ORDER — METHYLPREDNISOLONE ACETATE 40 MG/ML
40 INJECTION, SUSPENSION INTRA-ARTICULAR; INTRALESIONAL; INTRAMUSCULAR; SOFT TISSUE ONCE
Status: COMPLETED | OUTPATIENT
Start: 2018-11-13 | End: 2018-11-13

## 2018-11-13 RX ORDER — HYDROCODONE BITARTRATE AND ACETAMINOPHEN 7.5; 325 MG/1; MG/1
1 TABLET ORAL EVERY 12 HOURS PRN
Qty: 60 TABLET | Refills: 0 | Status: SHIPPED | OUTPATIENT
Start: 2018-11-13 | End: 2018-12-13

## 2018-11-13 RX ADMIN — METHYLPREDNISOLONE ACETATE 40 MG: 40 INJECTION, SUSPENSION INTRALESIONAL; INTRAMUSCULAR; INTRASYNOVIAL; SOFT TISSUE at 07:11

## 2018-11-13 NOTE — PROGRESS NOTES
Chief Pain Complaint:  Neck Pain, Bilateral Arm Pain, Shoulder Pain     History of Present Illness:  This patient is a 63 y.o. male who presents today complaining of the above noted pain/s. The patient describes this pain as follows.    - duration of pain: pain for years   - timing: constant   - character: sharp, aching  - radiating, dermatomal: extends to the shoulders b/l, nondermatomal  - antecedent trauma, prior spinal surgery: no prior trauma, no prior spinal surgery, bilateral shoulder surgery  - pertinent negatives: No fever, No chills, No weight loss, No bladder dysfunction, No bowel dysfunction, No saddle anesthesia  - pertinent positives: generalized nonspecific Upper Extremity weakness bilaterally    - medications, other therapies tried (physical therapy, injections):     >> Norco, flexeril, gabapentin, Mobic, Topamax, Fioricet    >> Has previously undergone Physical Therapy - which he does not find helpful but does exercises at home    >> Has previously undergone spinal injection/s & trigger point injections          IMAGING / Labs / Studies (reviewed on 11/13/2018):    5-12-16 XR Lumbar and Thoracic:  Findings: There is slight levoscoliotic curvature of the lumbar spine.  There is grade 2 anterolisthesis of L5 on S1 with minimal grade 1 anterolisthesis of L4 on L5 and slight retrolisthesis of L3 on L4.  Moderate to severe disc height loss present at L5-S1 with mild disc height loss at L3-L4 and L4-L5. Bilateral facet arthropathy present at L4-L5 and L5-S1. No acute fractures visualized.  The remaining visualized osseous and soft tissue structures demonstrate no appreciable abnormality.    Findings: There is mild dextroscoliotic curvature of the thoracic spine.  There are multiple mild age-indeterminate compression deformities involving the mid and lower thoracic spine with suspected involvement of T6-T11.  There is mild multilevel disc height loss noted throughout the thoracic spine with associated disc  osteophyte complex production. Posterior elements appear grossly intact.   The remaining visualized osseous and soft tissue structures demonstrate no appreciable abnormality.       4/28/14 MRI Cervical Spine Without Contrast    Narrative Study:   Cervical spine MRI without contrast.04/28/14 11:09:00  Technique:  Multiplanar non contrast images obtained on the cervical spine.   History: Left neck pain.  Left shoulder pain.  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  No abnormal signal in the cord.  The craniocervical junction is normal.  C2-3: Facet hypertrophy on the left side with mild left sided neural foraminal narrowing  C3-4: Disk space narrowing.  Broad-based osteophyte disk foot.  Bilateral uncovertebral-joint disease with moderate severe neural frontal narrowing.  C4-5: Disk space narrowing.  Broad-based osteophyte disk foot.  Bilateral uncovertebral joint disease with mild to severe neural foraminal narrowing.  Decreased CSF spaces anterior-posterior to the cord.  C5-6: Broad-based osteophyte and disk bulge.  Bilateral uncovertebral joint disease with mild to moderate bilateral bony neural frontal narrowing greater on the left side.  Disk protrusion into the left lateral recess.  C6-7: Disk space narrowing.  Broad-based osteophyte and disk bulge bilateral uncovertebral joint disease with mild neural foraminal narrowing.  C7-T1: Mild to space narrowing.  Mild uncovertebral joint disease.  No significant neural frontal narrowing.           Review of Systems:  CONSTITUTIONAL: patient denies any fever, chills, or weight loss  SKIN: patient denies any rash or itching  RESPIRATORY: patient denies having any shortness of breath  GASTROINTESTINAL: patient denies having any diarrhea, constipation, or bowel incontinence  GENITOURINARY: patient denies having any abnormal bladder function    MUSCULOSKELETAL:  - patient complains of the above noted pain/s (see chief pain complaint)    NEUROLOGICAL:   - pain  "as above  - strength in Upper extremities is decreased, BILATERALLY  - sensation in Upper extremities is abnormal, BILATERALLY  - patient denies any loss of bowel or bladder control      PSYCHIATRIC: patient denies any change in mood           Physical Exam:  Vitals:  BP (!) 150/86 (BP Location: Right arm, Patient Position: Sitting, BP Method: Medium (Automatic))   Pulse 105   Resp 18   Ht 5' 11" (1.803 m)   Wt 83.9 kg (185 lb)   BMI 25.80 kg/m²   (reviewed on 11/13/2018)    General: alert and oriented, in no apparent distress.  Gait: normal gait.  Skin: no rashes, no discoloration, no obvious lesions  HEENT: normocephalic, atraumatic. Pupils equal and round.  Cardiovascular: no significant peripheral edema present.  Respiratory: without use of accessory muscles of respiration.    Musculoskeletal - Cervical Spine:  - Pain on flexion of cervical spine: Absent   - Pain on extension of cervical spine: Present  - Cervical facet loading: Present  - TTP over the cervical facet joints: Present  - TTP over paracervical and trapezius muscles: Present bilaterally, tender to minimal palpation   - Spurling's: Negative    Neuro - Upper Extremities:  - BUE Strength:R/L: D: 5/5; B: 5/5; T: 5/5; WF: 5/5; WE: 5/5; IO: 5/5  - Extremity Reflexes: Brisk and symmetric throughout  - Sensory: Sensation to light touch intact bilaterally      Psych:  Mood and affect is appropriate          Assessment:  John Laguna is a 63 y.o. year old male who is presenting with     Encounter Diagnoses   Name Primary?    Spondylosis of cervical region without myelopathy or radiculopathy Yes    Cervical facet joint syndrome     Myofascial muscle pain     Pain in both hands     Myofascial pain     Neck and shoulder pain        Plan:  1. Interventional: TPI of paracervical / trapezius muscles today.  Procedure note listed below.    2. Pharmacologic:   - Refill Norco 7.5/325 mg Po BID PRN (60 tabs) x 2 months. Paper Rx given today. Patient " tolerating opioids with no side effects, obtaining good pain control with functional improvement. Dr. Malik previously had an extensive discussion with patient regarding the side effects, misuse/addiction potential from these medication.    - Continue Topamax 100mg QD.  - Opioid contract signed on 3/19/2018.     - [place]  reviewed and appropriate. Last filled 10-12-18.  - Will order UDS next visit to ensure medication compliance.      3. Rehabilitative: Encouraged regular exercise. Encourage home neck exercises.     4. Diagnostic: None for now.    5. Follow up: 8 weeks for med refill.     - I discussed the risks, benefits, and alternatives to potential treatment options. All questions and concerns were fully addressed today in clinic. Dr. Malik was consulted regarding the patient plan and agrees.           >>UDS:  8-11-16 :: appropriate (+hydrocodone metabolites, +barbituates)  1/3/2017 :: appropriate  5/24/2017 :: appropriate  11/17/17 :: appropriate  7/13/2018 :: appropriate    >>Opioid Risk Tool:  1/3/2017 :: 6        Procedures:  Procedure: Trigger point injections  Muscle/s injected: Bilateral Upper Trapezius, Bilateral Middle Trapezius, Bilateral Levator Scapulae, Bilateral Rhomboids   Side: Bilateral     Description of Procedure:  Prior to starting this procedure, risks, benefits, complications, and alternatives were discussed with the patient. The patient agreed to proceed with the procedure. The procedural sites were identified, marked, and widely prepped with ChloraPrep.   A 25-gauge 1.5 inch needle was introduced into the above noted muscle/s. Following negative aspiration, a volume of 1 to 1.5 mL of a solution comprised of 9 mL of 1% Lidocaine, and 1 mL of Depo-Medrol (40 mg/mL) was injected. No pain or paresthesia was noted on injection. This process was repeated at multiple sites throughout the above noted muscle/s until the above noted solution was exhausted. The patient tolerated the procedure well.  The injection sites were cleaned and bandaged as needed.      Complications: None

## 2018-12-05 RX ORDER — MELOXICAM 15 MG/1
TABLET ORAL
Qty: 30 TABLET | Refills: 0 | Status: SHIPPED | OUTPATIENT
Start: 2018-12-05 | End: 2019-01-11 | Stop reason: SDUPTHER

## 2018-12-12 RX ORDER — BUTALBITAL, ACETAMINOPHEN AND CAFFEINE 50; 325; 40 MG/1; MG/1; MG/1
TABLET ORAL
Qty: 30 TABLET | Refills: 0 | OUTPATIENT
Start: 2018-12-12

## 2018-12-28 ENCOUNTER — TELEPHONE (OUTPATIENT)
Dept: FAMILY MEDICINE | Facility: CLINIC | Age: 63
End: 2018-12-28

## 2018-12-28 NOTE — TELEPHONE ENCOUNTER
Spoke with patient and informed him that dr rosenberg requested that he go to urgent care for the rash on his hand. Patient verbalized understanding and states to let Dr Rosenberg know he is going to urgent care now.

## 2018-12-28 NOTE — TELEPHONE ENCOUNTER
----- Message from Parisa Richards sent at 12/28/2018  9:16 AM CST -----  pls work in for rash on hand..[phone] ([place])

## 2019-01-07 RX ORDER — HYDROCODONE BITARTRATE AND ACETAMINOPHEN 7.5; 325 MG/1; MG/1
1 TABLET ORAL EVERY 12 HOURS PRN
Qty: 60 TABLET | Refills: 0 | Status: SHIPPED | OUTPATIENT
Start: 2019-01-10 | End: 2019-02-09

## 2019-01-07 RX ORDER — HYDROCODONE BITARTRATE AND ACETAMINOPHEN 7.5; 325 MG/1; MG/1
1 TABLET ORAL EVERY 12 HOURS PRN
Qty: 60 TABLET | Refills: 0 | Status: SHIPPED | OUTPATIENT
Start: 2019-02-08 | End: 2019-03-10

## 2019-01-07 NOTE — TELEPHONE ENCOUNTER
----- Message from Gabby Mcneal sent at 1/7/2019  9:26 AM CST -----  Contact: pt  The pt request a call to reschedule his 01/08/2019 appt, no available appt showing, the pt can be reached at [phone]///[id number]

## 2019-01-13 RX ORDER — MELOXICAM 15 MG/1
TABLET ORAL
Qty: 30 TABLET | Refills: 0 | Status: SHIPPED | OUTPATIENT
Start: 2019-01-13 | End: 2019-02-18 | Stop reason: SDUPTHER

## 2019-01-15 ENCOUNTER — OFFICE VISIT (OUTPATIENT)
Dept: PAIN MEDICINE | Facility: CLINIC | Age: 64
End: 2019-01-15
Payer: MEDICAID

## 2019-01-15 VITALS
SYSTOLIC BLOOD PRESSURE: 152 MMHG | WEIGHT: 185 LBS | BODY MASS INDEX: 25.9 KG/M2 | HEIGHT: 71 IN | HEART RATE: 80 BPM | RESPIRATION RATE: 18 BRPM | DIASTOLIC BLOOD PRESSURE: 89 MMHG

## 2019-01-15 DIAGNOSIS — M79.18 MYOFASCIAL MUSCLE PAIN: ICD-10-CM

## 2019-01-15 DIAGNOSIS — M79.641 PAIN IN BOTH HANDS: ICD-10-CM

## 2019-01-15 DIAGNOSIS — M79.642 PAIN IN BOTH HANDS: ICD-10-CM

## 2019-01-15 DIAGNOSIS — M79.18 MYOFASCIAL PAIN: ICD-10-CM

## 2019-01-15 DIAGNOSIS — M47.812 CERVICAL FACET JOINT SYNDROME: ICD-10-CM

## 2019-01-15 DIAGNOSIS — M47.812 SPONDYLOSIS OF CERVICAL REGION WITHOUT MYELOPATHY OR RADICULOPATHY: Primary | ICD-10-CM

## 2019-01-15 PROCEDURE — 99214 OFFICE O/P EST MOD 30 MIN: CPT | Mod: 25,S$PBB,, | Performed by: PHYSICIAN ASSISTANT

## 2019-01-15 PROCEDURE — 20553 NJX 1/MLT TRIGGER POINTS 3/>: CPT | Mod: S$PBB,,, | Performed by: PHYSICIAN ASSISTANT

## 2019-01-15 PROCEDURE — 20553 NJX 1/MLT TRIGGER POINTS 3/>: CPT | Mod: PBBFAC | Performed by: PHYSICIAN ASSISTANT

## 2019-01-15 PROCEDURE — 99999 PR PBB SHADOW E&M-EST. PATIENT-LVL IV: ICD-10-PCS | Mod: PBBFAC,,, | Performed by: PHYSICIAN ASSISTANT

## 2019-01-15 PROCEDURE — 99214 PR OFFICE/OUTPT VISIT, EST, LEVL IV, 30-39 MIN: ICD-10-PCS | Mod: 25,S$PBB,, | Performed by: PHYSICIAN ASSISTANT

## 2019-01-15 PROCEDURE — 20553 PR INJECT TRIGGER POINTS, > 3: ICD-10-PCS | Mod: S$PBB,,, | Performed by: PHYSICIAN ASSISTANT

## 2019-01-15 PROCEDURE — 99999 PR PBB SHADOW E&M-EST. PATIENT-LVL IV: CPT | Mod: PBBFAC,,, | Performed by: PHYSICIAN ASSISTANT

## 2019-01-15 PROCEDURE — 99214 OFFICE O/P EST MOD 30 MIN: CPT | Mod: PBBFAC | Performed by: PHYSICIAN ASSISTANT

## 2019-01-15 RX ORDER — METHOCARBAMOL 500 MG/1
500 TABLET, FILM COATED ORAL 2 TIMES DAILY PRN
Qty: 60 TABLET | Refills: 0 | Status: SHIPPED | OUTPATIENT
Start: 2019-01-15 | End: 2019-07-24 | Stop reason: SDUPTHER

## 2019-01-15 RX ORDER — METHYLPREDNISOLONE ACETATE 40 MG/ML
40 INJECTION, SUSPENSION INTRA-ARTICULAR; INTRALESIONAL; INTRAMUSCULAR; SOFT TISSUE ONCE
Status: COMPLETED | OUTPATIENT
Start: 2019-01-15 | End: 2019-01-15

## 2019-01-15 RX ADMIN — METHYLPREDNISOLONE ACETATE 40 MG: 40 INJECTION, SUSPENSION INTRALESIONAL; INTRAMUSCULAR; INTRASYNOVIAL; SOFT TISSUE at 08:01

## 2019-01-15 NOTE — PROGRESS NOTES
Chief Pain Complaint:  Neck Pain, Bilateral Arm Pain, Shoulder Pain       History of Present Illness:  This patient is a 63 y.o. male who presents today complaining of the above noted pain/s. The patient describes this pain as follows.    - duration of pain: pain for years   - timing: constant   - character: sharp, aching  - radiating, dermatomal: extends to the shoulders b/l, nondermatomal  - antecedent trauma, prior spinal surgery: no prior trauma, no prior spinal surgery, bilateral shoulder surgery  - pertinent negatives: No fever, No chills, No weight loss, No bladder dysfunction, No bowel dysfunction, No saddle anesthesia  - pertinent positives: generalized nonspecific Upper Extremity weakness bilaterally    - medications, other therapies tried (physical therapy, injections):     >> Norco, flexeril, gabapentin, Mobic, Topamax, Fioricet    >> Has previously undergone Physical Therapy - which he does not find helpful but does exercises at home    >> Has previously undergone spinal injection/s & trigger point injections          IMAGING / Labs / Studies (reviewed on 1/15/2019):    5-12-16 XR Lumbar and Thoracic:  Findings: There is slight levoscoliotic curvature of the lumbar spine.  There is grade 2 anterolisthesis of L5 on S1 with minimal grade 1 anterolisthesis of L4 on L5 and slight retrolisthesis of L3 on L4.  Moderate to severe disc height loss present at L5-S1 with mild disc height loss at L3-L4 and L4-L5. Bilateral facet arthropathy present at L4-L5 and L5-S1. No acute fractures visualized.  The remaining visualized osseous and soft tissue structures demonstrate no appreciable abnormality.    Findings: There is mild dextroscoliotic curvature of the thoracic spine.  There are multiple mild age-indeterminate compression deformities involving the mid and lower thoracic spine with suspected involvement of T6-T11.  There is mild multilevel disc height loss noted throughout the thoracic spine with associated  disc osteophyte complex production. Posterior elements appear grossly intact.   The remaining visualized osseous and soft tissue structures demonstrate no appreciable abnormality.       4/28/14 MRI Cervical Spine Without Contrast    Narrative Study:   Cervical spine MRI without contrast.04/28/14 11:09:00  Technique:  Multiplanar non contrast images obtained on the cervical spine.   History: Left neck pain.  Left shoulder pain.  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  No abnormal signal in the cord.  The craniocervical junction is normal.  C2-3: Facet hypertrophy on the left side with mild left sided neural foraminal narrowing  C3-4: Disk space narrowing.  Broad-based osteophyte disk foot.  Bilateral uncovertebral-joint disease with moderate severe neural frontal narrowing.  C4-5: Disk space narrowing.  Broad-based osteophyte disk foot.  Bilateral uncovertebral joint disease with mild to severe neural foraminal narrowing.  Decreased CSF spaces anterior-posterior to the cord.  C5-6: Broad-based osteophyte and disk bulge.  Bilateral uncovertebral joint disease with mild to moderate bilateral bony neural frontal narrowing greater on the left side.  Disk protrusion into the left lateral recess.  C6-7: Disk space narrowing.  Broad-based osteophyte and disk bulge bilateral uncovertebral joint disease with mild neural foraminal narrowing.  C7-T1: Mild to space narrowing.  Mild uncovertebral joint disease.  No significant neural frontal narrowing.           Review of Systems:  CONSTITUTIONAL: patient denies any fever, chills, or weight loss  SKIN: patient denies any rash or itching  RESPIRATORY: patient denies having any shortness of breath  GASTROINTESTINAL: patient denies having any diarrhea, constipation, or bowel incontinence  GENITOURINARY: patient denies having any abnormal bladder function    MUSCULOSKELETAL:  - patient complains of the above noted pain/s (see chief pain complaint)    NEUROLOGICAL:   -  "pain as above  - strength in Upper extremities is decreased, BILATERALLY  - sensation in Upper extremities is abnormal, BILATERALLY  - patient denies any loss of bowel or bladder control      PSYCHIATRIC: patient denies any change in mood           Physical Exam:  Vitals:  BP (!) 152/89 (BP Location: Right arm, Patient Position: Sitting, BP Method: Medium (Automatic))   Pulse 80   Resp 18   Ht 5' 11" (1.803 m)   Wt 83.9 kg (185 lb)   BMI 25.80 kg/m²   (reviewed on 1/15/2019)    General: alert and oriented, in no apparent distress.  Gait: normal gait.  Skin: no rashes, no discoloration, no obvious lesions  HEENT: normocephalic, atraumatic. Pupils equal and round.  Cardiovascular: no significant peripheral edema present.  Respiratory: without use of accessory muscles of respiration.    Musculoskeletal - Cervical Spine:  - Pain on extension of cervical spine: Present  - Cervical facet loading: Present  - TTP over the cervical facet joints: Present  - TTP over paracervical and trapezius muscles: Present bilaterally, tender to minimal palpation   - Spurling's: Negative    Neuro - Upper Extremities:  - BUE Strength:R/L: D: 5/5; B: 5/5; T: 5/5; WF: 5/5; WE: 5/5; IO: 5/5  - Extremity Reflexes: Brisk and symmetric throughout  - Sensory: Sensation to light touch intact bilaterally      Psych:  Mood and affect is appropriate          Assessment:  John Laguna is a 63 y.o. year old male who is presenting with     Encounter Diagnoses   Name Primary?    Spondylosis of cervical region without myelopathy or radiculopathy Yes    Cervical facet joint syndrome     Myofascial muscle pain     Pain in both hands     Myofascial pain        Plan:  1. Interventional: TPI of paracervical / trapezius muscles today.  Procedure note listed below.    2. Pharmacologic:   - Refill Norco 7.5/325 mg Po BID PRN (60 tabs) x 2 months. Paper Rx given today. Patient tolerating opioids with no side effects, obtaining good pain control " with functional improvement. Dr. Malik previously had an extensive discussion with patient regarding the side effects, misuse/addiction potential from these medication.    - Continue Topamax 100mg QD.  - Opioid contract signed on 3/19/2018.     - LA  reviewed and appropriate. Last filled 12-12-18.  - Will order UDS next visit to ensure medication compliance.      3. Rehabilitative: Encouraged regular exercise. Encourage home neck exercises.     4. Diagnostic: None for now.    5. Follow up: 8 weeks for med refill.     - I discussed the risks, benefits, and alternatives to potential treatment options. All questions and concerns were fully addressed today in clinic. Dr. Malik was consulted regarding the patient plan and agrees.           >>UDS:  8-11-16 :: appropriate (+hydrocodone metabolites, +barbituates)  1/3/2017 :: appropriate  5/24/2017 :: appropriate  11/17/17 :: appropriate  7/13/2018 :: appropriate    >>Opioid Risk Tool:  1/3/2017 :: 6        Procedures:  Procedure: Trigger point injections  Muscle/s injected: Bilateral Upper Trapezius, Bilateral Middle Trapezius, Bilateral Levator Scapulae, Bilateral Rhomboids   Side: Bilateral     Description of Procedure:  Prior to starting this procedure, risks, benefits, complications, and alternatives were discussed with the patient. The patient agreed to proceed with the procedure. The procedural sites were identified, marked, and widely prepped with ChloraPrep.   A 27-gauge 1.5 inch needle was introduced into the above noted muscle/s. Following negative aspiration, a volume of 1 to 1.5 mL of a solution comprised of 9 mL of 1% Lidocaine, and 1 mL of Depo-Medrol (40 mg/mL) was injected. No pain or paresthesia was noted on injection. This process was repeated at multiple sites throughout the above noted muscle/s until the above noted solution was exhausted. The patient tolerated the procedure well. The injection sites were cleaned and bandaged as  needed.      Complications: None

## 2019-02-04 DIAGNOSIS — J02.9 SORE THROAT: ICD-10-CM

## 2019-02-04 DIAGNOSIS — J20.9 ACUTE BRONCHITIS, UNSPECIFIED ORGANISM: ICD-10-CM

## 2019-02-04 DIAGNOSIS — J01.01 ACUTE RECURRENT MAXILLARY SINUSITIS: ICD-10-CM

## 2019-02-06 RX ORDER — FLUTICASONE PROPIONATE 50 MCG
SPRAY, SUSPENSION (ML) NASAL
Qty: 16 ML | Refills: 0 | Status: SHIPPED | OUTPATIENT
Start: 2019-02-06 | End: 2019-04-10 | Stop reason: SDUPTHER

## 2019-02-18 DIAGNOSIS — E78.5 HYPERLIPIDEMIA, UNSPECIFIED HYPERLIPIDEMIA TYPE: ICD-10-CM

## 2019-02-18 RX ORDER — MELOXICAM 15 MG/1
TABLET ORAL
Qty: 30 TABLET | Refills: 0 | Status: SHIPPED | OUTPATIENT
Start: 2019-02-18 | End: 2020-06-25

## 2019-02-18 RX ORDER — AMLODIPINE AND BENAZEPRIL HYDROCHLORIDE 5; 20 MG/1; MG/1
CAPSULE ORAL
Qty: 90 CAPSULE | Refills: 0 | Status: SHIPPED | OUTPATIENT
Start: 2019-02-18 | End: 2020-12-29

## 2019-02-18 RX ORDER — ATORVASTATIN CALCIUM 40 MG/1
TABLET, FILM COATED ORAL
Qty: 30 TABLET | Refills: 0 | Status: SHIPPED | OUTPATIENT
Start: 2019-02-18 | End: 2019-04-12 | Stop reason: SDUPTHER

## 2019-02-18 RX ORDER — OMEPRAZOLE 20 MG/1
CAPSULE, DELAYED RELEASE ORAL
COMMUNITY
Start: 2019-01-11 | End: 2019-02-18 | Stop reason: SDUPTHER

## 2019-02-18 RX ORDER — OMEPRAZOLE 20 MG/1
20 CAPSULE, DELAYED RELEASE ORAL DAILY
Qty: 30 CAPSULE | Refills: 11 | Status: SHIPPED | OUTPATIENT
Start: 2019-02-18 | End: 2020-12-29 | Stop reason: SDUPTHER

## 2019-02-26 ENCOUNTER — TELEPHONE (OUTPATIENT)
Dept: PAIN MEDICINE | Facility: CLINIC | Age: 64
End: 2019-02-26

## 2019-02-26 NOTE — TELEPHONE ENCOUNTER
Patient was contacted to inform about location change for his upcoming appointment in Interventional Pain Management with Ariana Aguilar PA-C.  Location changed from [facility] to [facility] while [facility] is under construction.  [facility]

## 2019-03-04 ENCOUNTER — TELEPHONE (OUTPATIENT)
Dept: PAIN MEDICINE | Facility: CLINIC | Age: 64
End: 2019-03-04

## 2019-03-04 RX ORDER — HYDROCODONE BITARTRATE AND ACETAMINOPHEN 7.5; 325 MG/1; MG/1
1 TABLET ORAL EVERY 12 HOURS PRN
Qty: 60 TABLET | Refills: 0 | Status: SHIPPED | OUTPATIENT
Start: 2019-03-14 | End: 2019-04-13

## 2019-03-04 RX ORDER — HYDROCODONE BITARTRATE AND ACETAMINOPHEN 7.5; 325 MG/1; MG/1
1 TABLET ORAL EVERY 12 HOURS PRN
Qty: 60 TABLET | Refills: 0 | Status: SHIPPED | OUTPATIENT
Start: 2019-04-13 | End: 2019-05-13

## 2019-03-04 NOTE — TELEPHONE ENCOUNTER
----- Message from Gabby Mcneal sent at 3/4/2019  8:01 AM CST -----  Contact: pt  Type:  Sooner Apoointment Request    Caller is requesting a sooner appointment.  Caller declined first available appointment listed below.  Caller will not accept being placed on the waitlist and is requesting a message be sent to doctor.  Name of Caller: the pt  When is the first available appointment? n/a  Symptoms:n/a  Would the patient rather a call back or a response via [facility]? Call back  Best Call Back Number: [phone]  Additional Information: The pt wants to have his 03/05/2019 appt moved to any other time this week

## 2019-03-08 ENCOUNTER — OFFICE VISIT (OUTPATIENT)
Dept: PAIN MEDICINE | Facility: CLINIC | Age: 64
End: 2019-03-08
Payer: MEDICAID

## 2019-03-08 VITALS
HEIGHT: 71 IN | RESPIRATION RATE: 16 BRPM | SYSTOLIC BLOOD PRESSURE: 155 MMHG | BODY MASS INDEX: 25.9 KG/M2 | WEIGHT: 185 LBS | DIASTOLIC BLOOD PRESSURE: 85 MMHG | HEART RATE: 77 BPM

## 2019-03-08 DIAGNOSIS — M47.812 CERVICAL FACET JOINT SYNDROME: ICD-10-CM

## 2019-03-08 DIAGNOSIS — M50.30 DDD (DEGENERATIVE DISC DISEASE), CERVICAL: ICD-10-CM

## 2019-03-08 DIAGNOSIS — M79.641 PAIN IN BOTH HANDS: ICD-10-CM

## 2019-03-08 DIAGNOSIS — M79.18 MYOFASCIAL PAIN: ICD-10-CM

## 2019-03-08 DIAGNOSIS — M79.642 PAIN IN BOTH HANDS: ICD-10-CM

## 2019-03-08 DIAGNOSIS — M47.812 SPONDYLOSIS OF CERVICAL REGION WITHOUT MYELOPATHY OR RADICULOPATHY: Primary | ICD-10-CM

## 2019-03-08 DIAGNOSIS — M79.18 MYOFASCIAL MUSCLE PAIN: ICD-10-CM

## 2019-03-08 PROCEDURE — 20553 NJX 1/MLT TRIGGER POINTS 3/>: CPT | Mod: PBBFAC | Performed by: PHYSICIAN ASSISTANT

## 2019-03-08 PROCEDURE — 99214 OFFICE O/P EST MOD 30 MIN: CPT | Mod: 25,S$PBB,, | Performed by: PHYSICIAN ASSISTANT

## 2019-03-08 PROCEDURE — 20553 PR INJECT TRIGGER POINTS, > 3: ICD-10-PCS | Mod: S$PBB,,, | Performed by: PHYSICIAN ASSISTANT

## 2019-03-08 PROCEDURE — 20553 NJX 1/MLT TRIGGER POINTS 3/>: CPT | Mod: S$PBB,,, | Performed by: PHYSICIAN ASSISTANT

## 2019-03-08 PROCEDURE — 99999 PR PBB SHADOW E&M-EST. PATIENT-LVL IV: CPT | Mod: PBBFAC,,, | Performed by: PHYSICIAN ASSISTANT

## 2019-03-08 PROCEDURE — 99214 OFFICE O/P EST MOD 30 MIN: CPT | Mod: PBBFAC | Performed by: PHYSICIAN ASSISTANT

## 2019-03-08 PROCEDURE — 99999 PR PBB SHADOW E&M-EST. PATIENT-LVL IV: ICD-10-PCS | Mod: PBBFAC,,, | Performed by: PHYSICIAN ASSISTANT

## 2019-03-08 PROCEDURE — 99214 PR OFFICE/OUTPT VISIT, EST, LEVL IV, 30-39 MIN: ICD-10-PCS | Mod: 25,S$PBB,, | Performed by: PHYSICIAN ASSISTANT

## 2019-03-08 RX ORDER — METHYLPREDNISOLONE ACETATE 40 MG/ML
40 INJECTION, SUSPENSION INTRA-ARTICULAR; INTRALESIONAL; INTRAMUSCULAR; SOFT TISSUE ONCE
Status: COMPLETED | OUTPATIENT
Start: 2019-03-08 | End: 2019-03-08

## 2019-03-08 RX ADMIN — METHYLPREDNISOLONE ACETATE 40 MG: 40 INJECTION, SUSPENSION INTRALESIONAL; INTRAMUSCULAR; INTRASYNOVIAL; SOFT TISSUE at 07:03

## 2019-03-08 NOTE — PROGRESS NOTES
Chief Pain Complaint:  Neck Pain, Bilateral Arm Pain, Shoulder Pain       History of Present Illness:  This patient is a 63 y.o. male who presents today complaining of the above noted pain/s. The patient describes this pain as follows.    - duration of pain: pain for years   - timing: constant   - character: sharp, aching  - radiating, dermatomal: extends to the shoulders b/l, nondermatomal  - antecedent trauma, prior spinal surgery: no prior trauma, no prior spinal surgery, bilateral shoulder surgery  - pertinent negatives: No fever, No chills, No weight loss, No bladder dysfunction, No bowel dysfunction, No saddle anesthesia  - pertinent positives: generalized nonspecific Upper Extremity weakness bilaterally    - medications, other therapies tried (physical therapy, injections):     >> Norco, flexeril, gabapentin, Mobic, Topamax, Fioricet    >> Has previously undergone Physical Therapy - which he does not find helpful but does exercises at home    >> Has previously undergone spinal injection/s & trigger point injections          IMAGING / Labs / Studies (reviewed on 3/8/2019):    5-12-16 XR Lumbar and Thoracic:  Findings: There is slight levoscoliotic curvature of the lumbar spine.  There is grade 2 anterolisthesis of L5 on S1 with minimal grade 1 anterolisthesis of L4 on L5 and slight retrolisthesis of L3 on L4.  Moderate to severe disc height loss present at L5-S1 with mild disc height loss at L3-L4 and L4-L5. Bilateral facet arthropathy present at L4-L5 and L5-S1. No acute fractures visualized.  The remaining visualized osseous and soft tissue structures demonstrate no appreciable abnormality.    Findings: There is mild dextroscoliotic curvature of the thoracic spine.  There are multiple mild age-indeterminate compression deformities involving the mid and lower thoracic spine with suspected involvement of T6-T11.  There is mild multilevel disc height loss noted throughout the thoracic spine with associated disc  osteophyte complex production. Posterior elements appear grossly intact.   The remaining visualized osseous and soft tissue structures demonstrate no appreciable abnormality.       4/28/14 MRI Cervical Spine Without Contrast    Narrative Study:   Cervical spine MRI without contrast.04/28/14 11:09:00  Technique:  Multiplanar non contrast images obtained on the cervical spine.   History: Left neck pain.  Left shoulder pain.  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  No abnormal signal in the cord.  The craniocervical junction is normal.  C2-3: Facet hypertrophy on the left side with mild left sided neural foraminal narrowing  C3-4: Disk space narrowing.  Broad-based osteophyte disk foot.  Bilateral uncovertebral-joint disease with moderate severe neural frontal narrowing.  C4-5: Disk space narrowing.  Broad-based osteophyte disk foot.  Bilateral uncovertebral joint disease with mild to severe neural foraminal narrowing.  Decreased CSF spaces anterior-posterior to the cord.  C5-6: Broad-based osteophyte and disk bulge.  Bilateral uncovertebral joint disease with mild to moderate bilateral bony neural frontal narrowing greater on the left side.  Disk protrusion into the left lateral recess.  C6-7: Disk space narrowing.  Broad-based osteophyte and disk bulge bilateral uncovertebral joint disease with mild neural foraminal narrowing.  C7-T1: Mild to space narrowing.  Mild uncovertebral joint disease.  No significant neural frontal narrowing.           Review of Systems:  CONSTITUTIONAL: patient denies any fever, chills, or weight loss  SKIN: patient denies any rash or itching  RESPIRATORY: patient denies having any shortness of breath  GASTROINTESTINAL: patient denies having any diarrhea, constipation, or bowel incontinence  GENITOURINARY: patient denies having any abnormal bladder function    MUSCULOSKELETAL:  - patient complains of the above noted pain/s (see chief pain complaint)    NEUROLOGICAL:   - pain  "as above  - strength in Upper extremities is decreased, BILATERALLY  - sensation in Upper extremities is abnormal, BILATERALLY  - patient denies any loss of bowel or bladder control      PSYCHIATRIC: patient denies any change in mood           Physical Exam:  Vitals:  BP (!) 155/85 (BP Location: Right arm, Patient Position: Sitting, BP Method: Medium (Automatic))   Pulse 77   Resp 16   Ht 5' 11" (1.803 m)   Wt 83.9 kg (185 lb)   BMI 25.80 kg/m²   (reviewed on 3/8/2019)    General: alert and oriented, in no apparent distress.  Gait: normal gait.  Skin: no rashes, no discoloration, no obvious lesions  HEENT: normocephalic, atraumatic. Pupils equal and round.  Cardiovascular: no significant peripheral edema present.  Respiratory: without use of accessory muscles of respiration.    Musculoskeletal - Cervical Spine:  - Full ROM  - Pain on flexion of cervical spine: Absent   - Pain on extension of cervical spine: Present  - Cervical facet loading: Present  - TTP over the cervical facet joints: Present  - TTP over paracervical and trapezius muscles: Present bilaterally, tender to minimal palpation   - Spurling's: Negative    Neuro - Upper Extremities:  - BUE Strength:R/L: D: 5/5; B: 5/5; T: 5/5; WF: 5/5; WE: 5/5; IO: 5/5  - Extremity Reflexes: Brisk and symmetric throughout  - Sensory: Sensation to light touch intact bilaterally      Psych:  Mood and affect is appropriate          Assessment:  John Laguna is a 63 y.o. year old male who is presenting with     Encounter Diagnoses   Name Primary?    Spondylosis of cervical region without myelopathy or radiculopathy Yes    Cervical facet joint syndrome     Myofascial muscle pain     Pain in both hands     Myofascial pain     DDD (degenerative disc disease), cervical        Plan:  1. Interventional: TPI of paracervical / trapezius muscles today.  Procedure note listed below.    2. Pharmacologic:   - Refill Norco 7.5/325 mg Po BID PRN (60 tabs) x 2 months. " Paper Rx given today. Patient tolerating opioids with no side effects, obtaining good pain control with functional improvement. Dr. Malik previously had an extensive discussion with patient regarding the side effects, misuse/addiction potential from these medication.    - Continue Topamax 100mg QD.  - Opioid contract signed on 3/19/2018.     - LA  reviewed and appropriate. Last filled 2-13-19.  - Will order UDS today to ensure medication compliance.      3. Rehabilitative: Encouraged regular exercise. Encourage home neck exercises.     4. Diagnostic: None for now.    5. Follow up: 8 weeks for med refill.     - I discussed the risks, benefits, and alternatives to potential treatment options. All questions and concerns were fully addressed today in clinic. Dr. Malik was consulted regarding the patient plan and agrees.           >>UDS:  8-11-16 :: appropriate (+hydrocodone metabolites, +barbituates)  1/3/2017 :: appropriate  5/24/2017 :: appropriate  11/17/17 :: appropriate  7/13/2018 :: appropriate  3/8/2019 :: pending     >>Opioid Risk Tool:  1/3/2017 :: 6        Procedures:  Procedure: Trigger point injections  Muscle/s injected: Bilateral Upper Trapezius, Bilateral Middle Trapezius, Bilateral Levator Scapulae, Bilateral Rhomboids   Side: Bilateral     Description of Procedure:  Prior to starting this procedure, risks, benefits, complications, and alternatives were discussed with the patient. The patient agreed to proceed with the procedure. The procedural sites were identified, marked, and widely prepped with ChloraPrep.   A 27-gauge 1.5 inch needle was introduced into the above noted muscle/s. Following negative aspiration, a volume of 1 to 1.5 mL of a solution comprised of 9 mL of 1% Lidocaine, and 1 mL of Depo-Medrol (40 mg/mL) was injected. No pain or paresthesia was noted on injection. This process was repeated at multiple sites throughout the above noted muscle/s until the above noted solution was  exhausted. The patient tolerated the procedure well. The injection sites were cleaned and bandaged as needed.      Complications: None

## 2019-04-10 DIAGNOSIS — J20.9 ACUTE BRONCHITIS, UNSPECIFIED ORGANISM: ICD-10-CM

## 2019-04-10 DIAGNOSIS — J01.01 ACUTE RECURRENT MAXILLARY SINUSITIS: ICD-10-CM

## 2019-04-10 DIAGNOSIS — J02.9 SORE THROAT: ICD-10-CM

## 2019-04-10 RX ORDER — MELOXICAM 15 MG/1
TABLET ORAL
Qty: 30 TABLET | Refills: 0 | OUTPATIENT
Start: 2019-04-10

## 2019-04-11 RX ORDER — FLUTICASONE PROPIONATE 50 MCG
SPRAY, SUSPENSION (ML) NASAL
Qty: 16 ML | Refills: 0 | Status: SHIPPED | OUTPATIENT
Start: 2019-04-11 | End: 2020-12-29 | Stop reason: SDUPTHER

## 2019-04-12 DIAGNOSIS — E78.5 HYPERLIPIDEMIA, UNSPECIFIED HYPERLIPIDEMIA TYPE: ICD-10-CM

## 2019-04-12 RX ORDER — ATORVASTATIN CALCIUM 40 MG/1
TABLET, FILM COATED ORAL
Qty: 30 TABLET | Refills: 0 | Status: SHIPPED | OUTPATIENT
Start: 2019-04-12 | End: 2020-12-29

## 2019-04-16 ENCOUNTER — PATIENT OUTREACH (OUTPATIENT)
Dept: ADMINISTRATIVE | Facility: HOSPITAL | Age: 64
End: 2019-04-16

## 2019-04-26 RX ORDER — HYDROCODONE BITARTRATE AND ACETAMINOPHEN 7.5; 325 MG/1; MG/1
1 TABLET ORAL EVERY 12 HOURS PRN
Qty: 60 TABLET | Refills: 0 | Status: SHIPPED | OUTPATIENT
Start: 2019-06-12 | End: 2019-04-26 | Stop reason: CLARIF

## 2019-04-26 RX ORDER — HYDROCODONE BITARTRATE AND ACETAMINOPHEN 7.5; 325 MG/1; MG/1
1 TABLET ORAL EVERY 12 HOURS PRN
Qty: 60 TABLET | Refills: 0 | Status: SHIPPED | OUTPATIENT
Start: 2019-05-13 | End: 2019-04-26 | Stop reason: CLARIF

## 2019-05-03 ENCOUNTER — OFFICE VISIT (OUTPATIENT)
Dept: PAIN MEDICINE | Facility: CLINIC | Age: 64
End: 2019-05-03
Payer: MEDICAID

## 2019-05-03 VITALS
HEIGHT: 71 IN | BODY MASS INDEX: 25.9 KG/M2 | HEART RATE: 64 BPM | DIASTOLIC BLOOD PRESSURE: 89 MMHG | SYSTOLIC BLOOD PRESSURE: 159 MMHG | WEIGHT: 185 LBS | RESPIRATION RATE: 18 BRPM

## 2019-05-03 DIAGNOSIS — M79.18 MYOFASCIAL MUSCLE PAIN: ICD-10-CM

## 2019-05-03 DIAGNOSIS — M47.812 SPONDYLOSIS OF CERVICAL REGION WITHOUT MYELOPATHY OR RADICULOPATHY: Primary | ICD-10-CM

## 2019-05-03 DIAGNOSIS — M79.642 PAIN IN BOTH HANDS: ICD-10-CM

## 2019-05-03 DIAGNOSIS — M50.30 DDD (DEGENERATIVE DISC DISEASE), CERVICAL: ICD-10-CM

## 2019-05-03 DIAGNOSIS — M79.641 PAIN IN BOTH HANDS: ICD-10-CM

## 2019-05-03 PROCEDURE — 99999 PR PBB SHADOW E&M-EST. PATIENT-LVL IV: CPT | Mod: PBBFAC,,, | Performed by: PHYSICIAN ASSISTANT

## 2019-05-03 PROCEDURE — 20552 NJX 1/MLT TRIGGER POINT 1/2: CPT | Mod: PBBFAC | Performed by: PHYSICIAN ASSISTANT

## 2019-05-03 PROCEDURE — 99214 OFFICE O/P EST MOD 30 MIN: CPT | Mod: 25,S$PBB,, | Performed by: PHYSICIAN ASSISTANT

## 2019-05-03 PROCEDURE — 99214 OFFICE O/P EST MOD 30 MIN: CPT | Mod: PBBFAC | Performed by: PHYSICIAN ASSISTANT

## 2019-05-03 PROCEDURE — 99999 PR PBB SHADOW E&M-EST. PATIENT-LVL IV: ICD-10-PCS | Mod: PBBFAC,,, | Performed by: PHYSICIAN ASSISTANT

## 2019-05-03 PROCEDURE — 20552 PR INJECT TRIGGER POINT, 1 OR 2: ICD-10-PCS | Mod: S$PBB,,, | Performed by: PHYSICIAN ASSISTANT

## 2019-05-03 PROCEDURE — 99214 PR OFFICE/OUTPT VISIT, EST, LEVL IV, 30-39 MIN: ICD-10-PCS | Mod: 25,S$PBB,, | Performed by: PHYSICIAN ASSISTANT

## 2019-05-03 PROCEDURE — 20552 NJX 1/MLT TRIGGER POINT 1/2: CPT | Mod: S$PBB,,, | Performed by: PHYSICIAN ASSISTANT

## 2019-05-03 RX ORDER — HYDROCODONE BITARTRATE AND ACETAMINOPHEN 7.5; 325 MG/1; MG/1
1 TABLET ORAL EVERY 12 HOURS PRN
Qty: 60 TABLET | Refills: 0 | Status: SHIPPED | OUTPATIENT
Start: 2019-06-12 | End: 2019-07-12

## 2019-05-03 RX ORDER — METHYLPREDNISOLONE ACETATE 40 MG/ML
40 INJECTION, SUSPENSION INTRA-ARTICULAR; INTRALESIONAL; INTRAMUSCULAR; SOFT TISSUE ONCE
Status: COMPLETED | OUTPATIENT
Start: 2019-05-03 | End: 2019-05-03

## 2019-05-03 RX ORDER — HYDROCODONE BITARTRATE AND ACETAMINOPHEN 7.5; 325 MG/1; MG/1
1 TABLET ORAL EVERY 12 HOURS PRN
Qty: 60 TABLET | Refills: 0 | Status: SHIPPED | OUTPATIENT
Start: 2019-05-13 | End: 2019-06-12

## 2019-05-03 RX ADMIN — METHYLPREDNISOLONE ACETATE 40 MG: 40 INJECTION, SUSPENSION INTRALESIONAL; INTRAMUSCULAR; INTRASYNOVIAL; SOFT TISSUE at 07:05

## 2019-05-03 NOTE — PROGRESS NOTES
Chief Pain Complaint:  Neck Pain, Bilateral Arm Pain, Shoulder Pain       History of Present Illness:  This patient is a 63 y.o. male who presents today complaining of the above noted pain/s. The patient describes this pain as follows.    - duration of pain: pain for years   - timing: constant   - character: sharp, aching  - radiating, dermatomal: extends to the shoulders b/l, nondermatomal  - antecedent trauma, prior spinal surgery: no prior trauma, no prior spinal surgery, bilateral shoulder surgery  - pertinent negatives: No fever, No chills, No weight loss, No bladder dysfunction, No bowel dysfunction, No saddle anesthesia  - pertinent positives: generalized nonspecific Upper Extremity weakness bilaterally    - medications, other therapies tried (physical therapy, injections):     >> Norco, flexeril, gabapentin, Mobic, Topamax, Fioricet    >> Has previously undergone Physical Therapy - which he does not find helpful but does exercises at home    >> Has previously undergone spinal injection/s & trigger point injections          IMAGING / Labs / Studies (reviewed on 5/3/2019):    5-12-16 XR Lumbar and Thoracic:  Findings: There is slight levoscoliotic curvature of the lumbar spine.  There is grade 2 anterolisthesis of L5 on S1 with minimal grade 1 anterolisthesis of L4 on L5 and slight retrolisthesis of L3 on L4.  Moderate to severe disc height loss present at L5-S1 with mild disc height loss at L3-L4 and L4-L5. Bilateral facet arthropathy present at L4-L5 and L5-S1. No acute fractures visualized.  The remaining visualized osseous and soft tissue structures demonstrate no appreciable abnormality.  Findings: There is mild dextroscoliotic curvature of the thoracic spine.  There are multiple mild age-indeterminate compression deformities involving the mid and lower thoracic spine with suspected involvement of T6-T11.  There is mild multilevel disc height loss noted throughout the thoracic spine with associated disc  osteophyte complex production. Posterior elements appear grossly intact.   The remaining visualized osseous and soft tissue structures demonstrate no appreciable abnormality.       4/28/14 MRI Cervical Spine Without Contrast    Narrative Study:   Cervical spine MRI without contrast.04/28/14 11:09:00  Technique:  Multiplanar non contrast images obtained on the cervical spine.   History: Left neck pain.  Left shoulder pain.  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  No abnormal signal in the cord.  The craniocervical junction is normal.  C2-3: Facet hypertrophy on the left side with mild left sided neural foraminal narrowing  C3-4: Disk space narrowing.  Broad-based osteophyte disk foot.  Bilateral uncovertebral-joint disease with moderate severe neural frontal narrowing.  C4-5: Disk space narrowing.  Broad-based osteophyte disk foot.  Bilateral uncovertebral joint disease with mild to severe neural foraminal narrowing.  Decreased CSF spaces anterior-posterior to the cord.  C5-6: Broad-based osteophyte and disk bulge.  Bilateral uncovertebral joint disease with mild to moderate bilateral bony neural frontal narrowing greater on the left side.  Disk protrusion into the left lateral recess.  C6-7: Disk space narrowing.  Broad-based osteophyte and disk bulge bilateral uncovertebral joint disease with mild neural foraminal narrowing.  C7-T1: Mild to space narrowing.  Mild uncovertebral joint disease.  No significant neural frontal narrowing.           Review of Systems:  CONSTITUTIONAL: patient denies any fever, chills, or weight loss  SKIN: patient denies any rash or itching  RESPIRATORY: patient denies having any shortness of breath  GASTROINTESTINAL: patient denies having any diarrhea, constipation, or bowel incontinence  GENITOURINARY: patient denies having any abnormal bladder function    MUSCULOSKELETAL:  - patient complains of the above noted pain/s (see chief pain complaint)    NEUROLOGICAL:   - pain  "as above  - strength in Upper extremities is decreased, BILATERALLY  - sensation in Upper extremities is abnormal, BILATERALLY  - patient denies any loss of bowel or bladder control      PSYCHIATRIC: patient denies any change in mood           Physical Exam:  Vitals:  BP (!) 159/89 (BP Location: Right arm, Patient Position: Sitting, BP Method: Medium (Automatic))   Pulse 64   Resp 18   Ht 5' 11" (1.803 m)   Wt 83.9 kg (185 lb)   BMI 25.80 kg/m²   (reviewed on 5/3/2019)    General: alert and oriented, in no apparent distress.  Gait: normal gait.  Skin: no rashes, no discoloration, no obvious lesions  HEENT: normocephalic, atraumatic. Pupils equal and round.  Cardiovascular: no significant peripheral edema present.  Respiratory: without use of accessory muscles of respiration.    Musculoskeletal - Cervical Spine:  - Pain on flexion of cervical spine: Absent   - Pain on extension of cervical spine: Present  - Cervical facet loading: Present  - TTP over the cervical facet joints: Present  - TTP over paracervical and trapezius muscles: Present bilaterally, tender to minimal palpation, worse on left   - Spurling's: Negative    Neuro - Upper Extremities:  - BUE Strength:R/L: D: 5/5; B: 5/5; T: 5/5; WF: 5/5; WE: 5/5; IO: 5/5  - Extremity Reflexes: Brisk and symmetric throughout  - Sensory: Sensation to light touch intact bilaterally      Psych:  Mood and affect is appropriate          Assessment:  John Laguna is a 63 y.o. year old male who is presenting with     Encounter Diagnoses   Name Primary?    Spondylosis of cervical region without myelopathy or radiculopathy Yes    Myofascial muscle pain     Pain in both hands     DDD (degenerative disc disease), cervical        Plan:  1. Interventional: TPI of paracervical / trapezius muscles today.  Procedure note listed below.    2. Pharmacologic:   - Refill Norco 7.5/325 mg Po BID PRN (60 tabs) x 2 months. Paper Rx given today. Patient tolerating opioids with no " side effects, obtaining good pain control with functional improvement. Dr. Malik previously had an extensive discussion with patient regarding the side effects, misuse/addiction potential from these medication.    - Continue Topamax 100mg QD.  - Opioid contract signed on 3/19/2018.     - [place]  reviewed and appropriate. Last filled 4-14-19.  - Will order UDS today to ensure medication compliance.      3. Rehabilitative: Encouraged regular exercise. Encourage home neck exercises.     4. Diagnostic: None for now.    5. Follow up: 9 weeks for med refill.     - I discussed the risks, benefits, and alternatives to potential treatment options. All questions and concerns were fully addressed today in clinic. Dr. Malik was consulted regarding the patient plan and agrees.           >>UDS:  8-11-16 :: appropriate (+hydrocodone metabolites, +barbituates)  1/3/2017 :: appropriate  5/24/2017 :: appropriate  11/17/17 :: appropriate  7/13/2018 :: appropriate  3/8/2019 :: (no results - test requisition not marked)  5/3/2019 :: pending     >>Opioid Risk Tool:  1/3/2017 :: 6        Procedures:  Procedure: Trigger point injections  Muscle/s injected: Bilateral Upper Trapezius, Bilateral Middle Trapezius, Bilateral Levator Scapulae, Bilateral Rhomboids   Side: Bilateral     Description of Procedure:  Prior to starting this procedure, risks, benefits, complications, and alternatives were discussed with the patient. The patient agreed to proceed with the procedure. The procedural sites were identified, marked, and widely prepped with ChloraPrep.   A 27-gauge 1.5 inch needle was introduced into the above noted muscle/s. Following negative aspiration, a volume of 1 to 1.5 mL of a solution comprised of 5 mL of 1% Lidocaine, and 2.5 mL of Depo-Medrol (40 mg/mL) was injected. No pain or paresthesia was noted on injection. This process was repeated at multiple sites throughout the above noted muscle/s until the above noted solution was  exhausted. The patient tolerated the procedure well. The injection sites were cleaned and bandaged as needed.      Complications: None

## 2019-06-26 RX ORDER — HYDROCODONE BITARTRATE AND ACETAMINOPHEN 7.5; 325 MG/1; MG/1
1 TABLET ORAL EVERY 12 HOURS PRN
Qty: 60 TABLET | Refills: 0 | Status: SHIPPED | OUTPATIENT
Start: 2019-07-28 | End: 2019-07-03

## 2019-06-26 RX ORDER — HYDROCODONE BITARTRATE AND ACETAMINOPHEN 7.5; 325 MG/1; MG/1
1 TABLET ORAL EVERY 12 HOURS PRN
Qty: 60 TABLET | Refills: 0 | Status: SHIPPED | OUTPATIENT
Start: 2019-06-28 | End: 2019-07-03

## 2019-07-24 ENCOUNTER — OFFICE VISIT (OUTPATIENT)
Dept: PAIN MEDICINE | Facility: CLINIC | Age: 64
End: 2019-07-24
Payer: MEDICAID

## 2019-07-24 VITALS
BODY MASS INDEX: 25.9 KG/M2 | SYSTOLIC BLOOD PRESSURE: 147 MMHG | DIASTOLIC BLOOD PRESSURE: 78 MMHG | HEIGHT: 71 IN | WEIGHT: 185 LBS | HEART RATE: 67 BPM

## 2019-07-24 DIAGNOSIS — M54.2 NECK AND SHOULDER PAIN: ICD-10-CM

## 2019-07-24 DIAGNOSIS — M25.519 NECK AND SHOULDER PAIN: ICD-10-CM

## 2019-07-24 DIAGNOSIS — M50.30 DDD (DEGENERATIVE DISC DISEASE), CERVICAL: ICD-10-CM

## 2019-07-24 DIAGNOSIS — M79.18 MYOFASCIAL PAIN: ICD-10-CM

## 2019-07-24 DIAGNOSIS — M47.812 CERVICAL FACET JOINT SYNDROME: ICD-10-CM

## 2019-07-24 DIAGNOSIS — M47.812 SPONDYLOSIS OF CERVICAL REGION WITHOUT MYELOPATHY OR RADICULOPATHY: Primary | ICD-10-CM

## 2019-07-24 PROCEDURE — 20553 NJX 1/MLT TRIGGER POINTS 3/>: CPT | Mod: PBBFAC | Performed by: PHYSICIAN ASSISTANT

## 2019-07-24 PROCEDURE — 99213 OFFICE O/P EST LOW 20 MIN: CPT | Mod: PBBFAC | Performed by: PHYSICIAN ASSISTANT

## 2019-07-24 PROCEDURE — 99214 PR OFFICE/OUTPT VISIT, EST, LEVL IV, 30-39 MIN: ICD-10-PCS | Mod: 25,S$PBB,, | Performed by: PHYSICIAN ASSISTANT

## 2019-07-24 PROCEDURE — 20553 PR INJECT TRIGGER POINTS, > 3: ICD-10-PCS | Mod: S$PBB,,, | Performed by: PHYSICIAN ASSISTANT

## 2019-07-24 PROCEDURE — 99999 PR PBB SHADOW E&M-EST. PATIENT-LVL III: ICD-10-PCS | Mod: PBBFAC,,, | Performed by: PHYSICIAN ASSISTANT

## 2019-07-24 PROCEDURE — 20553 NJX 1/MLT TRIGGER POINTS 3/>: CPT | Mod: S$PBB,,, | Performed by: PHYSICIAN ASSISTANT

## 2019-07-24 PROCEDURE — 99999 PR PBB SHADOW E&M-EST. PATIENT-LVL III: CPT | Mod: PBBFAC,,, | Performed by: PHYSICIAN ASSISTANT

## 2019-07-24 PROCEDURE — 99214 OFFICE O/P EST MOD 30 MIN: CPT | Mod: 25,S$PBB,, | Performed by: PHYSICIAN ASSISTANT

## 2019-07-24 RX ORDER — METHOCARBAMOL 500 MG/1
500 TABLET, FILM COATED ORAL 2 TIMES DAILY PRN
Qty: 60 TABLET | Refills: 0 | Status: SHIPPED | OUTPATIENT
Start: 2019-07-24 | End: 2019-08-27 | Stop reason: SDUPTHER

## 2019-07-24 RX ORDER — HYDROCODONE BITARTRATE AND ACETAMINOPHEN 7.5; 325 MG/1; MG/1
1 TABLET ORAL EVERY 12 HOURS PRN
Qty: 60 TABLET | Refills: 0 | Status: SHIPPED | OUTPATIENT
Start: 2019-07-24 | End: 2019-08-23

## 2019-07-24 RX ORDER — HYDROCODONE BITARTRATE AND ACETAMINOPHEN 7.5; 325 MG/1; MG/1
1 TABLET ORAL EVERY 12 HOURS PRN
Qty: 60 TABLET | Refills: 0 | Status: SHIPPED | OUTPATIENT
Start: 2019-08-23 | End: 2019-09-22

## 2019-07-24 RX ORDER — METHYLPREDNISOLONE ACETATE 40 MG/ML
40 INJECTION, SUSPENSION INTRA-ARTICULAR; INTRALESIONAL; INTRAMUSCULAR; SOFT TISSUE ONCE
Status: COMPLETED | OUTPATIENT
Start: 2019-07-24 | End: 2019-07-24

## 2019-07-24 RX ADMIN — METHYLPREDNISOLONE ACETATE 40 MG: 40 INJECTION, SUSPENSION INTRALESIONAL; INTRAMUSCULAR; INTRASYNOVIAL; SOFT TISSUE at 07:07

## 2019-07-24 NOTE — PROGRESS NOTES
Chief Pain Complaint:  Neck Pain, Bilateral Arm Pain, Shoulder Pain       History of Present Illness:  This patient is a 64 y.o. male who presents today complaining of the above noted pain/s. The patient describes this pain as follows.    - duration of pain: pain for years   - timing: constant   - character: sharp, aching  - radiating, dermatomal: extends to the shoulders b/l, nondermatomal  - antecedent trauma, prior spinal surgery: no prior trauma, no prior spinal surgery, bilateral shoulder surgery  - pertinent negatives: No fever, No chills, No weight loss, No bladder dysfunction, No bowel dysfunction, No saddle anesthesia  - pertinent positives: generalized nonspecific Upper Extremity weakness bilaterally    - medications, other therapies tried (physical therapy, injections):     >> Norco, flexeril, gabapentin, Mobic, Topamax, Fioricet    >> Has previously undergone Physical Therapy - which he does not find helpful but does exercises at home    >> Has previously undergone spinal injection/s & trigger point injections          IMAGING / Labs / Studies (reviewed on 7/24/2019):    5-12-16 XR Lumbar and Thoracic:  Findings: There is slight levoscoliotic curvature of the lumbar spine.  There is grade 2 anterolisthesis of L5 on S1 with minimal grade 1 anterolisthesis of L4 on L5 and slight retrolisthesis of L3 on L4.  Moderate to severe disc height loss present at L5-S1 with mild disc height loss at L3-L4 and L4-L5. Bilateral facet arthropathy present at L4-L5 and L5-S1. No acute fractures visualized.  The remaining visualized osseous and soft tissue structures demonstrate no appreciable abnormality.  Findings: There is mild dextroscoliotic curvature of the thoracic spine.  There are multiple mild age-indeterminate compression deformities involving the mid and lower thoracic spine with suspected involvement of T6-T11.  There is mild multilevel disc height loss noted throughout the thoracic spine with associated disc  osteophyte complex production. Posterior elements appear grossly intact.   The remaining visualized osseous and soft tissue structures demonstrate no appreciable abnormality.       4/28/14 MRI Cervical Spine Without Contrast    Narrative Study:   Cervical spine MRI without contrast.04/28/14 11:09:00  Technique:  Multiplanar non contrast images obtained on the cervical spine.   History: Left neck pain.  Left shoulder pain.  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  No abnormal signal in the cord.  The craniocervical junction is normal.  C2-3: Facet hypertrophy on the left side with mild left sided neural foraminal narrowing  C3-4: Disk space narrowing.  Broad-based osteophyte disk foot.  Bilateral uncovertebral-joint disease with moderate severe neural frontal narrowing.  C4-5: Disk space narrowing.  Broad-based osteophyte disk foot.  Bilateral uncovertebral joint disease with mild to severe neural foraminal narrowing.  Decreased CSF spaces anterior-posterior to the cord.  C5-6: Broad-based osteophyte and disk bulge.  Bilateral uncovertebral joint disease with mild to moderate bilateral bony neural frontal narrowing greater on the left side.  Disk protrusion into the left lateral recess.  C6-7: Disk space narrowing.  Broad-based osteophyte and disk bulge bilateral uncovertebral joint disease with mild neural foraminal narrowing.  C7-T1: Mild to space narrowing.  Mild uncovertebral joint disease.  No significant neural frontal narrowing.           Review of Systems:  CONSTITUTIONAL: patient denies any fever, chills, or weight loss  SKIN: patient denies any rash or itching  RESPIRATORY: patient denies having any shortness of breath  GASTROINTESTINAL: patient denies having any diarrhea, constipation, or bowel incontinence  GENITOURINARY: patient denies having any abnormal bladder function    MUSCULOSKELETAL:  - patient complains of the above noted pain/s (see chief pain complaint)    NEUROLOGICAL:   - pain  "as above  - strength in Upper extremities is decreased, BILATERALLY  - sensation in Upper extremities is abnormal, BILATERALLY  - patient denies any loss of bowel or bladder control      PSYCHIATRIC: patient denies any change in mood           Physical Exam:  Vitals:  BP (!) 147/78 (BP Location: Right arm, Patient Position: Sitting, BP Method: Medium (Automatic))   Pulse 67   Ht 5' 11" (1.803 m)   Wt 83.9 kg (185 lb)   BMI 25.80 kg/m²   (reviewed on 7/24/2019)    General: alert and oriented, in no apparent distress.  Gait: normal gait.  Skin: no rashes, no discoloration, no obvious lesions  HEENT: normocephalic, atraumatic. Pupils equal and round.  Cardiovascular: no significant peripheral edema present.  Respiratory: without use of accessory muscles of respiration.    Musculoskeletal - Cervical Spine:  - Somewhat limited ROM, mostly with rotation  - Pain on flexion of cervical spine: Absent   - Pain on extension of cervical spine: Present  - Cervical facet loading: Present  - TTP over the cervical facet joints: Present  - TTP over paracervical and trapezius muscles: Present bilaterally, tender to minimal palpation, worse on left   - Spurling's: Negative    Neuro - Upper Extremities:  - BUE Strength:R/L: D: 5/5; B: 5/5; T: 5/5; WF: 5/5; WE: 5/5; IO: 5/5  - Extremity Reflexes: Brisk and symmetric throughout  - Sensory: Sensation to light touch intact bilaterally      Psych:  Mood and affect is appropriate          Assessment:  John Laguna is a 64 y.o. year old male who is presenting with     Encounter Diagnoses   Name Primary?    Spondylosis of cervical region without myelopathy or radiculopathy Yes    DDD (degenerative disc disease), cervical     Cervical facet joint syndrome     Myofascial pain     Neck and shoulder pain        Plan:  1. Interventional: TPI of paracervical / trapezius muscles today.  Procedure note listed below.    2. Pharmacologic:   - Refill Norco 7.5/325 mg Po BID PRN (60 tabs) " x 2 months. Paper Rx given today. Patient tolerating opioids with no side effects, obtaining good pain control with functional improvement. Dr. Malik previously had an extensive discussion with patient regarding the side effects, misuse/addiction potential from these medication.    - Continue Topamax 100mg QD.  - Opioid contract signed on 3/19/2018.     - [place]  reviewed and appropriate. Last filled 5-14-19. He was only given #45 tablets.  - Will order UDS next visit to ensure medication compliance.      3. Rehabilitative: Encouraged regular exercise. Encourage home neck exercises.     4. Diagnostic: None for now.    5. Follow up: 8 weeks for med refill.     - I discussed the risks, benefits, and alternatives to potential treatment options. All questions and concerns were fully addressed today in clinic. Dr. Malik was consulted regarding the patient plan and agrees.           >>UDS:  8-11-16 :: appropriate (+hydrocodone metabolites, +barbituates)  1/3/2017 :: appropriate  5/24/2017 :: appropriate  11/17/17 :: appropriate  7/13/2018 :: appropriate  3/8/2019 :: (no results - test requisition not marked)  5/3/2019 :: (no results - test requisition not marked)    >>Opioid Risk Tool:  1/3/2017 :: 6        Procedures:  Procedure: Trigger point injections  Muscle/s injected: Bilateral Upper Trapezius, Bilateral Middle Trapezius, Bilateral Levator Scapulae, Bilateral Rhomboids   Side: Bilateral     Description of Procedure:  Prior to starting this procedure, risks, benefits, complications, and alternatives were discussed with the patient. The patient agreed to proceed with the procedure. The procedural sites were identified, marked, and widely prepped with ChloraPrep.   A 27-gauge 1.5 inch needle was introduced into the above noted muscle/s. Following negative aspiration, a volume of 10mL solution comprised of 9 mL of 1% Lidocaine, and 1 mL of Depo-Medrol (40 mg/mL) was injected. No pain or paresthesia was noted on  injection. This process was repeated at multiple sites throughout the above noted muscle/s until the above noted solution was exhausted. The patient tolerated the procedure well. The injection sites were cleaned and bandaged as needed.      Complications: None

## 2019-08-21 ENCOUNTER — TELEPHONE (OUTPATIENT)
Dept: PAIN MEDICINE | Facility: CLINIC | Age: 64
End: 2019-08-21

## 2019-08-21 NOTE — TELEPHONE ENCOUNTER
----- Message from Luz Whyte sent at 8/21/2019 10:17 AM CDT -----  Contact: self [phone]  States that he is calling to speak to nurse. States that the pharm needs an authorization to give pt a quantity of 60 on the [place]. Pt uses     [id number] - [place], [place] - [address]  [address]  [address] [address]  Phone: [phone] Fax: [phone]    Please call back at [phone]//thank you acc

## 2019-08-27 ENCOUNTER — TELEPHONE (OUTPATIENT)
Dept: PAIN MEDICINE | Facility: CLINIC | Age: 64
End: 2019-08-27

## 2019-08-27 RX ORDER — METHOCARBAMOL 500 MG/1
500 TABLET, FILM COATED ORAL 2 TIMES DAILY PRN
Qty: 60 TABLET | Refills: 0 | Status: SHIPPED | OUTPATIENT
Start: 2019-08-27 | End: 2019-10-18 | Stop reason: SDUPTHER

## 2019-08-27 NOTE — TELEPHONE ENCOUNTER
----- Message from Annie De Leon MA sent at 8/27/2019 12:51 PM CDT -----  Contact: pt      ----- Message -----  From: Francia Michelle  Sent: 8/27/2019  12:19 PM  To: Lauren BAY Staff    He's calling in regards to refill    rxmethocarbamol (ROBAXIN) 500 MG Tab       pls call pt back at [phone] (home)         [id number] - [place], [place] - [address]  [address] [facility] [address]  Phone: [phone] Fax: [phone]

## 2019-08-27 NOTE — TELEPHONE ENCOUNTER
----- Message from Ariana Aguilar PA-C sent at 8/27/2019  1:02 PM CDT -----  Contact: pt  Done.    ----- Message -----  From: Annie De Leon MA  Sent: 8/27/2019  12:51 PM  To: Ariana Aguilar PA-C        ----- Message -----  From: Francia Michelle  Sent: 8/27/2019  12:19 PM  To: Lauren BAY Staff    He's calling in regards to refill    rxmethocarbamol (ROBAXIN) 500 MG Tab       pls call pt back at [phone] (home)         [id number] - [place], [place] - [address] [place], [place] [address]  [address]  [address] [facility] [address]  Phone: [phone] Fax: [phone]

## 2019-09-16 RX ORDER — HYDROCODONE BITARTRATE AND ACETAMINOPHEN 7.5; 325 MG/1; MG/1
1 TABLET ORAL EVERY 12 HOURS PRN
Qty: 60 TABLET | Refills: 0 | Status: SHIPPED | OUTPATIENT
Start: 2019-09-21 | End: 2019-10-21

## 2019-09-16 RX ORDER — HYDROCODONE BITARTRATE AND ACETAMINOPHEN 7.5; 325 MG/1; MG/1
1 TABLET ORAL EVERY 12 HOURS PRN
Qty: 60 TABLET | Refills: 0 | Status: SHIPPED | OUTPATIENT
Start: 2019-10-21 | End: 2019-11-20

## 2019-09-25 ENCOUNTER — TELEPHONE (OUTPATIENT)
Dept: PHYSICAL MEDICINE AND REHAB | Facility: CLINIC | Age: 64
End: 2019-09-25

## 2019-09-25 NOTE — TELEPHONE ENCOUNTER
----- Message from Jamin Ruth sent at 9/25/2019  3:25 PM CDT -----  Contact: Pt  Please give pt a call at [phone] (home) to have his appt rescheduled and due to medicaid it's pushing him back to November 14

## 2019-09-25 NOTE — TELEPHONE ENCOUNTER
Spoke with patient to let him know that we received his message regarding rescheduling his appointment. Advised patient that Ariana Aguilar PA-C and her staff are already gone for the day but that I will route them a message regarding rescheduling his appointment when they return to the office tomorrow. Patient verbalized understanding.

## 2019-10-08 ENCOUNTER — TELEPHONE (OUTPATIENT)
Dept: PAIN MEDICINE | Facility: CLINIC | Age: 64
End: 2019-10-08

## 2019-10-08 NOTE — TELEPHONE ENCOUNTER
----- Message from Jacqueline Bai sent at 10/8/2019  9:41 AM CDT -----  Contact: self [phone]  Pt would like to reschedule follow-up appt to 10/18/19.  Please call back at [phone]. Md Yamilet

## 2019-10-08 NOTE — TELEPHONE ENCOUNTER
----- Message from Jacqueline Bai sent at 10/8/2019  9:41 AM CDT -----  Contact: self [phone]  Pt would like to reschedule follow-up appt to 10/18/19.  Please call back at [phone]. Md Yamilet

## 2019-10-18 ENCOUNTER — OFFICE VISIT (OUTPATIENT)
Dept: PAIN MEDICINE | Facility: CLINIC | Age: 64
End: 2019-10-18
Payer: MEDICAID

## 2019-10-18 VITALS — RESPIRATION RATE: 17 BRPM | WEIGHT: 185 LBS | HEIGHT: 71 IN | BODY MASS INDEX: 25.9 KG/M2

## 2019-10-18 DIAGNOSIS — M47.812 SPONDYLOSIS OF CERVICAL REGION WITHOUT MYELOPATHY OR RADICULOPATHY: Primary | ICD-10-CM

## 2019-10-18 DIAGNOSIS — M79.18 MYOFASCIAL MUSCLE PAIN: ICD-10-CM

## 2019-10-18 DIAGNOSIS — M25.519 NECK AND SHOULDER PAIN: ICD-10-CM

## 2019-10-18 DIAGNOSIS — M50.30 DDD (DEGENERATIVE DISC DISEASE), CERVICAL: ICD-10-CM

## 2019-10-18 DIAGNOSIS — M47.812 CERVICAL FACET JOINT SYNDROME: ICD-10-CM

## 2019-10-18 DIAGNOSIS — M54.2 NECK AND SHOULDER PAIN: ICD-10-CM

## 2019-10-18 PROCEDURE — 99999 PR PBB SHADOW E&M-EST. PATIENT-LVL III: CPT | Mod: PBBFAC,,, | Performed by: PHYSICIAN ASSISTANT

## 2019-10-18 PROCEDURE — 20552 NJX 1/MLT TRIGGER POINT 1/2: CPT | Mod: S$PBB,,, | Performed by: PHYSICIAN ASSISTANT

## 2019-10-18 PROCEDURE — 99214 PR OFFICE/OUTPT VISIT, EST, LEVL IV, 30-39 MIN: ICD-10-PCS | Mod: 25,S$PBB,, | Performed by: PHYSICIAN ASSISTANT

## 2019-10-18 PROCEDURE — 99214 OFFICE O/P EST MOD 30 MIN: CPT | Mod: 25,S$PBB,, | Performed by: PHYSICIAN ASSISTANT

## 2019-10-18 PROCEDURE — 20552 NJX 1/MLT TRIGGER POINT 1/2: CPT | Mod: PBBFAC | Performed by: PHYSICIAN ASSISTANT

## 2019-10-18 PROCEDURE — 99999 PR PBB SHADOW E&M-EST. PATIENT-LVL III: ICD-10-PCS | Mod: PBBFAC,,, | Performed by: PHYSICIAN ASSISTANT

## 2019-10-18 PROCEDURE — 99213 OFFICE O/P EST LOW 20 MIN: CPT | Mod: PBBFAC | Performed by: PHYSICIAN ASSISTANT

## 2019-10-18 PROCEDURE — 20552 PR INJECT TRIGGER POINT, 1 OR 2: ICD-10-PCS | Mod: S$PBB,,, | Performed by: PHYSICIAN ASSISTANT

## 2019-10-18 RX ORDER — METHOCARBAMOL 500 MG/1
500 TABLET, FILM COATED ORAL 2 TIMES DAILY PRN
Qty: 60 TABLET | Refills: 0 | Status: SHIPPED | OUTPATIENT
Start: 2019-10-18 | End: 2020-02-07 | Stop reason: SDUPTHER

## 2019-10-18 RX ORDER — METHYLPREDNISOLONE ACETATE 40 MG/ML
40 INJECTION, SUSPENSION INTRA-ARTICULAR; INTRALESIONAL; INTRAMUSCULAR; SOFT TISSUE
Status: COMPLETED | OUTPATIENT
Start: 2019-10-18 | End: 2019-10-18

## 2019-10-18 RX ADMIN — METHYLPREDNISOLONE ACETATE 40 MG: 40 INJECTION, SUSPENSION INTRALESIONAL; INTRAMUSCULAR; INTRASYNOVIAL; SOFT TISSUE at 07:10

## 2019-10-18 NOTE — PROGRESS NOTES
Chief Pain Complaint:  Neck Pain, Bilateral Arm Pain, Shoulder Pain       History of Present Illness:  This patient is a 64 y.o. male who presents today complaining of the above noted pain/s. The patient describes this pain as follows.    - duration of pain: pain for years   - timing: constant   - character: sharp, aching  - radiating, dermatomal: extends to the shoulders b/l, nondermatomal  - antecedent trauma, prior spinal surgery: no prior trauma, no prior spinal surgery, bilateral shoulder surgery  - pertinent negatives: No fever, No chills, No weight loss, No bladder dysfunction, No bowel dysfunction, No saddle anesthesia  - pertinent positives: generalized nonspecific Upper Extremity weakness bilaterally    - medications, other therapies tried (physical therapy, injections):     >> Norco, flexeril, gabapentin, Mobic, Topamax, Fioricet    >> Has previously undergone Physical Therapy - which he does not find helpful but does exercises at home    >> Has previously undergone spinal injection/s & trigger point injections          IMAGING / Labs / Studies (reviewed on 10/18/2019):    5-12-16 XR Lumbar and Thoracic:  Findings: There is slight levoscoliotic curvature of the lumbar spine.  There is grade 2 anterolisthesis of L5 on S1 with minimal grade 1 anterolisthesis of L4 on L5 and slight retrolisthesis of L3 on L4.  Moderate to severe disc height loss present at L5-S1 with mild disc height loss at L3-L4 and L4-L5. Bilateral facet arthropathy present at L4-L5 and L5-S1. No acute fractures visualized.  The remaining visualized osseous and soft tissue structures demonstrate no appreciable abnormality.  Findings: There is mild dextroscoliotic curvature of the thoracic spine.  There are multiple mild age-indeterminate compression deformities involving the mid and lower thoracic spine with suspected involvement of T6-T11.  There is mild multilevel disc height loss noted throughout the thoracic spine with associated disc  osteophyte complex production. Posterior elements appear grossly intact.   The remaining visualized osseous and soft tissue structures demonstrate no appreciable abnormality.       4/28/14 MRI Cervical Spine Without Contrast    Narrative Study:   Cervical spine MRI without contrast.04/28/14 11:09:00  Technique:  Multiplanar non contrast images obtained on the cervical spine.   History: Left neck pain.  Left shoulder pain.  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  No abnormal signal in the cord.  The craniocervical junction is normal.  C2-3: Facet hypertrophy on the left side with mild left sided neural foraminal narrowing  C3-4: Disk space narrowing.  Broad-based osteophyte disk foot.  Bilateral uncovertebral-joint disease with moderate severe neural frontal narrowing.  C4-5: Disk space narrowing.  Broad-based osteophyte disk foot.  Bilateral uncovertebral joint disease with mild to severe neural foraminal narrowing.  Decreased CSF spaces anterior-posterior to the cord.  C5-6: Broad-based osteophyte and disk bulge.  Bilateral uncovertebral joint disease with mild to moderate bilateral bony neural frontal narrowing greater on the left side.  Disk protrusion into the left lateral recess.  C6-7: Disk space narrowing.  Broad-based osteophyte and disk bulge bilateral uncovertebral joint disease with mild neural foraminal narrowing.  C7-T1: Mild to space narrowing.  Mild uncovertebral joint disease.  No significant neural frontal narrowing.           Review of Systems:  CONSTITUTIONAL: patient denies any fever, chills, or weight loss  SKIN: patient denies any rash or itching  RESPIRATORY: patient denies having any shortness of breath  GASTROINTESTINAL: patient denies having any diarrhea, constipation, or bowel incontinence  GENITOURINARY: patient denies having any abnormal bladder function    MUSCULOSKELETAL:  - patient complains of the above noted pain/s (see chief pain complaint)    NEUROLOGICAL:   - pain  "as above  - strength in Upper extremities is decreased, BILATERALLY  - sensation in Upper extremities is abnormal, BILATERALLY  - patient denies any loss of bowel or bladder control      PSYCHIATRIC: patient denies any change in mood           Physical Exam:  Vitals:  Resp 17   Ht 5' 11" (1.803 m)   Wt 83.9 kg (185 lb)   BMI 25.80 kg/m²   (reviewed on 10/18/2019)    General: alert and oriented, in no apparent distress.  Gait: normal gait.  Skin: no rashes, no discoloration, no obvious lesions  HEENT: normocephalic, atraumatic. Pupils equal and round.  Cardiovascular: no significant peripheral edema present.  Respiratory: without use of accessory muscles of respiration.    Musculoskeletal - Cervical Spine:  - Pain on flexion of cervical spine: Absent   - Pain on extension of cervical spine: Present  - Cervical facet loading: Present  - TTP over the cervical facet joints: Present  - TTP over paracervical and trapezius muscles: Present bilaterally, tender to minimal palpation, worse on left   - Spurling's: Negative    Neuro - Upper Extremities:  - BUE Strength:R/L: D: 5/5; B: 5/5; T: 5/5; WF: 5/5; WE: 5/5; IO: 5/5  - Extremity Reflexes: Brisk and symmetric throughout  - Sensory: Sensation to light touch intact bilaterally      Psych:  Mood and affect is appropriate          Assessment:  John Laguna is a 64 y.o. year old male who is presenting with     Encounter Diagnoses   Name Primary?    Spondylosis of cervical region without myelopathy or radiculopathy Yes    DDD (degenerative disc disease), cervical     Cervical facet joint syndrome     Myofascial muscle pain     Neck and shoulder pain        Plan:  1. Interventional: TPI of paracervical / trapezius muscles today.  Procedure note listed below.    2. Pharmacologic:   - Refill Norco 7.5/325 mg Po BID PRN (60 tabs) x 2 months. Paper Rx given today. Patient tolerating opioids with no side effects, obtaining good pain control with functional improvement. " Dr. Malik previously had an extensive discussion with patient regarding the side effects, misuse/addiction potential from these medication.    - Continue Topamax 100mg QD.  - Opioid contract signed on 3/19/2018.     - [place]  reviewed and appropriate. Last filled 8-22-19.  - Will order UDS next visit to ensure medication compliance.      3. Rehabilitative: Encouraged regular exercise. Encourage home neck exercises.     4. Diagnostic: None for now.    5. Follow up: 8 weeks for med refill.     - I discussed the risks, benefits, and alternatives to potential treatment options. All questions and concerns were fully addressed today in clinic. Dr. Malik was consulted regarding the patient plan and agrees.           >>UDS:  8-11-16 :: appropriate (+hydrocodone metabolites, +barbituates)  1/3/2017 :: appropriate  5/24/2017 :: appropriate  11/17/17 :: appropriate  7/13/2018 :: appropriate  3/8/2019 :: (no results - test requisition not marked)  5/3/2019 :: (no results - test requisition not marked)    >>Opioid Risk Tool:  1/3/2017 :: 6        Procedures:  Procedure: Trigger point injections  Muscle/s injected: Bilateral Upper Trapezius, Bilateral Middle Trapezius, Bilateral Levator Scapulae, Bilateral Rhomboids   Side: Bilateral     Description of Procedure:  Prior to starting this procedure, risks, benefits, complications, and alternatives were discussed with the patient. The patient agreed to proceed with the procedure. The procedural sites were identified, marked, and widely prepped with ChloraPrep.   A 27-gauge 1.5 inch needle was introduced into the above noted muscle/s. Following negative aspiration, a volume of 10mL solution comprised of 7.5 mL of 1% Lidocaine, and 2.5 mL of Depo-Medrol (40 mg/mL) was injected. No pain or paresthesia was noted on injection. This process was repeated at multiple sites throughout the above noted muscle/s until the above noted solution was exhausted. The patient tolerated the procedure  well. The injection sites were cleaned and bandaged as needed.      Complications: None

## 2019-10-21 RX ORDER — HYDROCODONE BITARTRATE AND ACETAMINOPHEN 7.5; 325 MG/1; MG/1
TABLET ORAL
Qty: 15 TABLET | Refills: 0 | Status: SHIPPED | OUTPATIENT
Start: 2019-10-21 | End: 2020-03-13 | Stop reason: ALTCHOICE

## 2019-10-21 NOTE — TELEPHONE ENCOUNTER
----- Message from Ariana Aguilar PA-C sent at 10/21/2019  8:02 AM CDT -----  Contact: pt  Can you call pharmacy? I gave #60 total, so he should be able to talk to the pharmacy to dispense the rest and pay for them.     ----- Message -----  From: Jami Garcia LPN  Sent: 10/18/2019   2:50 PM CDT  To: Ariana Aguilar PA-C    Contacted pt. Pt requesting refill Robaxin. Informed pt  will send in now. Pt also requesting Norco #15 as pharmacy only dispensed #45 to complete rx #60. Pt states he will pay out of pocket. Please advise.//lp    ----- Message -----  From: Lianne Luciano  Sent: 10/18/2019   2:00 PM CDT  To: [facility] Staff    Patient called about status of refill. He can be reached at [phone]        Thanks,  iLanne Luciano

## 2019-10-29 DIAGNOSIS — J01.01 ACUTE RECURRENT MAXILLARY SINUSITIS: ICD-10-CM

## 2019-10-29 DIAGNOSIS — J02.9 SORE THROAT: ICD-10-CM

## 2019-10-29 DIAGNOSIS — J20.9 ACUTE BRONCHITIS, UNSPECIFIED ORGANISM: ICD-10-CM

## 2019-10-29 RX ORDER — FLUTICASONE PROPIONATE 50 MCG
SPRAY, SUSPENSION (ML) NASAL
Qty: 16 ML | Refills: 0 | OUTPATIENT
Start: 2019-10-29

## 2019-11-11 ENCOUNTER — TELEPHONE (OUTPATIENT)
Dept: FAMILY MEDICINE | Facility: CLINIC | Age: 64
End: 2019-11-11

## 2019-11-11 NOTE — TELEPHONE ENCOUNTER
----- Message from Katherineyolanda Crane sent at 11/11/2019 12:36 PM CST -----  Contact: pt   Type:  Sooner Apoointment Request    Caller is requesting a sooner appointment.  Caller declined first available appointment listed below.  Caller will not accept being placed on the waitlist and is requesting a message be sent to doctor.  Name of Caller: pt  When is the first available appointment? January 2020  Symptoms: rash/ itching  Would the patient rather a call back or a response via [facility]? call  Best Call Back Number: [phone]  Additional Information: Pt would like to be worked in with Dr. Rosenberg soon than January.

## 2019-11-11 NOTE — TELEPHONE ENCOUNTER
Attempted to return call to patient to schedule a sooner appointment. No answer, left message on voicemail

## 2019-11-25 RX ORDER — HYDROCODONE BITARTRATE AND ACETAMINOPHEN 7.5; 325 MG/1; MG/1
1 TABLET ORAL EVERY 12 HOURS PRN
Qty: 60 TABLET | Refills: 0 | Status: SHIPPED | OUTPATIENT
Start: 2019-12-19 | End: 2020-01-28

## 2019-11-25 RX ORDER — HYDROCODONE BITARTRATE AND ACETAMINOPHEN 7.5; 325 MG/1; MG/1
1 TABLET ORAL EVERY 12 HOURS PRN
Qty: 60 TABLET | Refills: 0 | Status: SHIPPED | OUTPATIENT
Start: 2020-01-18 | End: 2020-01-28

## 2019-12-04 ENCOUNTER — TELEPHONE (OUTPATIENT)
Dept: PAIN MEDICINE | Facility: CLINIC | Age: 64
End: 2019-12-04

## 2019-12-04 NOTE — TELEPHONE ENCOUNTER
----- Message from Marcelina Hernandez sent at 12/4/2019 11:55 AM CST -----  Contact: pt  .Type:  Sooner Apoointment Request    Caller is requesting a sooner appointment.  Caller declined first available appointment listed below.  Caller will not accept being placed on the waitlist and is requesting a message be sent to doctor.  Name of Caller: pt  When is the first available appointment? 1/2  Symptoms: 98 wk med refill  Would the patient rather a call back or a response via [facility]?  Call back   Best Call Back Number: [phone] (home)   Additional Information:

## 2019-12-05 ENCOUNTER — PATIENT OUTREACH (OUTPATIENT)
Dept: ADMINISTRATIVE | Facility: HOSPITAL | Age: 64
End: 2019-12-05

## 2019-12-05 NOTE — PROGRESS NOTES
HTN OUTREACH: I spoke to a man that stated he was not the pt. I asked him if he could have the pt to call me he said yes.

## 2019-12-10 ENCOUNTER — TELEPHONE (OUTPATIENT)
Dept: PAIN MEDICINE | Facility: CLINIC | Age: 64
End: 2019-12-10

## 2019-12-10 NOTE — TELEPHONE ENCOUNTER
----- Message from Sheeba Leyva sent at 12/10/2019  7:28 AM CST -----  Contact: pt  Pt would like to be called back regarding his appt    Pt can be reached at [phone]

## 2019-12-10 NOTE — TELEPHONE ENCOUNTER
----- Message from Gabby Mcneal sent at 12/10/2019  8:25 AM CST -----  Contact: pt  Type:  Patient Returning Call    Who Called: the pt   Who Left Message for Patient: unknown  Does the patient know what this is regarding?: appt  Would the patient rather a call back or a response via [email or web address]? Call back  Best Call Back Number: [phone]  Additional Information: n/a

## 2020-01-02 ENCOUNTER — TELEPHONE (OUTPATIENT)
Dept: PAIN MEDICINE | Facility: CLINIC | Age: 65
End: 2020-01-02

## 2020-01-02 NOTE — TELEPHONE ENCOUNTER
----- Message from Radha Padilla sent at 1/2/2020  9:54 AM CST -----  Contact: self  Requesting call back regarding questions about pt upcoming appt. Please call back at [phone]

## 2020-01-20 RX ORDER — HYDROCODONE BITARTRATE AND ACETAMINOPHEN 7.5; 325 MG/1; MG/1
1 TABLET ORAL EVERY 12 HOURS PRN
Qty: 60 TABLET | Refills: 0 | Status: SHIPPED | OUTPATIENT
Start: 2020-02-19 | End: 2020-03-20

## 2020-01-20 RX ORDER — HYDROCODONE BITARTRATE AND ACETAMINOPHEN 7.5; 325 MG/1; MG/1
1 TABLET ORAL EVERY 12 HOURS PRN
Qty: 60 TABLET | Refills: 0 | Status: SHIPPED | OUTPATIENT
Start: 2020-01-21 | End: 2020-02-20

## 2020-01-24 ENCOUNTER — TELEPHONE (OUTPATIENT)
Dept: PAIN MEDICINE | Facility: CLINIC | Age: 65
End: 2020-01-24

## 2020-01-24 NOTE — TELEPHONE ENCOUNTER
----- Message from Gabby Mcneal sent at 1/24/2020  4:07 PM CST -----  Contact: pt  The pt request a return call to reschedule his 01/28/2020 appt, no additional info given and can be reached at [phone]///[id number]

## 2020-01-30 ENCOUNTER — TELEPHONE (OUTPATIENT)
Dept: PAIN MEDICINE | Facility: CLINIC | Age: 65
End: 2020-01-30

## 2020-01-30 NOTE — TELEPHONE ENCOUNTER
----- Message from Marcelina Hernandez sent at 1/30/2020  2:31 PM CST -----  Contact: pt  .Type:  Sooner Apoointment Request    Caller is requesting a sooner appointment.  Caller declined first available appointment listed below.  Caller will not accept being placed on the waitlist and is requesting a message be sent to doctor.  Name of Caller: pt  When is the first available appointment? 1/31   Symptoms: 8 week med refill w/ UDS  Would the patient rather a call back or a response via [facility]? Call back   Best Call Back Number: [phone] (home)     Additional Information: pt has the flu will like to reschedule

## 2020-02-07 ENCOUNTER — OFFICE VISIT (OUTPATIENT)
Dept: PAIN MEDICINE | Facility: CLINIC | Age: 65
End: 2020-02-07
Payer: MEDICAID

## 2020-02-07 VITALS
BODY MASS INDEX: 25.9 KG/M2 | HEIGHT: 71 IN | DIASTOLIC BLOOD PRESSURE: 99 MMHG | HEART RATE: 81 BPM | WEIGHT: 185 LBS | SYSTOLIC BLOOD PRESSURE: 183 MMHG

## 2020-02-07 DIAGNOSIS — M47.812 CERVICAL FACET JOINT SYNDROME: ICD-10-CM

## 2020-02-07 DIAGNOSIS — M54.2 NECK AND SHOULDER PAIN: ICD-10-CM

## 2020-02-07 DIAGNOSIS — M47.812 SPONDYLOSIS OF CERVICAL REGION WITHOUT MYELOPATHY OR RADICULOPATHY: Primary | ICD-10-CM

## 2020-02-07 DIAGNOSIS — M25.519 NECK AND SHOULDER PAIN: ICD-10-CM

## 2020-02-07 DIAGNOSIS — M50.30 DDD (DEGENERATIVE DISC DISEASE), CERVICAL: ICD-10-CM

## 2020-02-07 DIAGNOSIS — M79.18 MYOFASCIAL MUSCLE PAIN: ICD-10-CM

## 2020-02-07 PROCEDURE — 20553 NJX 1/MLT TRIGGER POINTS 3/>: CPT | Mod: S$PBB,,, | Performed by: PHYSICIAN ASSISTANT

## 2020-02-07 PROCEDURE — 99999 PR PBB SHADOW E&M-EST. PATIENT-LVL IV: ICD-10-PCS | Mod: PBBFAC,,, | Performed by: PHYSICIAN ASSISTANT

## 2020-02-07 PROCEDURE — 99214 PR OFFICE/OUTPT VISIT, EST, LEVL IV, 30-39 MIN: ICD-10-PCS | Mod: 25,S$PBB,, | Performed by: PHYSICIAN ASSISTANT

## 2020-02-07 PROCEDURE — 99214 OFFICE O/P EST MOD 30 MIN: CPT | Mod: 25,S$PBB,, | Performed by: PHYSICIAN ASSISTANT

## 2020-02-07 PROCEDURE — 20553 NJX 1/MLT TRIGGER POINTS 3/>: CPT | Mod: PBBFAC | Performed by: PHYSICIAN ASSISTANT

## 2020-02-07 PROCEDURE — 20553 PR INJECT TRIGGER POINTS, > 3: ICD-10-PCS | Mod: S$PBB,,, | Performed by: PHYSICIAN ASSISTANT

## 2020-02-07 PROCEDURE — 99999 PR PBB SHADOW E&M-EST. PATIENT-LVL IV: CPT | Mod: PBBFAC,,, | Performed by: PHYSICIAN ASSISTANT

## 2020-02-07 PROCEDURE — 99214 OFFICE O/P EST MOD 30 MIN: CPT | Mod: PBBFAC | Performed by: PHYSICIAN ASSISTANT

## 2020-02-07 RX ORDER — METHYLPREDNISOLONE ACETATE 40 MG/ML
40 INJECTION, SUSPENSION INTRA-ARTICULAR; INTRALESIONAL; INTRAMUSCULAR; SOFT TISSUE
Status: COMPLETED | OUTPATIENT
Start: 2020-02-07 | End: 2020-02-07

## 2020-02-07 RX ORDER — METHOCARBAMOL 500 MG/1
500 TABLET, FILM COATED ORAL 2 TIMES DAILY PRN
Qty: 60 TABLET | Refills: 0 | Status: SHIPPED | OUTPATIENT
Start: 2020-02-07 | End: 2020-04-16 | Stop reason: SDUPTHER

## 2020-02-07 RX ADMIN — METHYLPREDNISOLONE ACETATE 40 MG: 40 INJECTION, SUSPENSION INTRA-ARTICULAR; INTRALESIONAL; INTRAMUSCULAR; SOFT TISSUE at 08:02

## 2020-02-07 NOTE — PROGRESS NOTES
Chief Pain Complaint:  Neck Pain, Bilateral Arm Pain, Shoulder Pain       History of Present Illness:  This patient is a 64 y.o. male who presents today complaining of the above noted pain/s. The patient describes this pain as follows.    - duration of pain: pain for years   - timing: constant   - character: sharp, aching  - radiating, dermatomal: extends to the shoulders b/l, nondermatomal  - antecedent trauma, prior spinal surgery: no prior trauma, no prior spinal surgery, bilateral shoulder surgery  - pertinent negatives: No fever, No chills, No weight loss, No bladder dysfunction, No bowel dysfunction, No saddle anesthesia  - pertinent positives: generalized nonspecific Upper Extremity weakness bilaterally    - medications, other therapies tried (physical therapy, injections):     >> Norco, flexeril, gabapentin, Mobic, Topamax, Fioricet    >> Has previously undergone Physical Therapy - which he does not find helpful but does exercises at home    >> Has previously undergone spinal injection/s & trigger point injections          IMAGING / Labs / Studies (reviewed on 2/7/2020):    5-12-16 XR Lumbar and Thoracic:  Findings: There is slight levoscoliotic curvature of the lumbar spine.  There is grade 2 anterolisthesis of L5 on S1 with minimal grade 1 anterolisthesis of L4 on L5 and slight retrolisthesis of L3 on L4.  Moderate to severe disc height loss present at L5-S1 with mild disc height loss at L3-L4 and L4-L5. Bilateral facet arthropathy present at L4-L5 and L5-S1. No acute fractures visualized.  The remaining visualized osseous and soft tissue structures demonstrate no appreciable abnormality.  Findings: There is mild dextroscoliotic curvature of the thoracic spine.  There are multiple mild age-indeterminate compression deformities involving the mid and lower thoracic spine with suspected involvement of T6-T11.  There is mild multilevel disc height loss noted throughout the thoracic spine with associated disc  osteophyte complex production. Posterior elements appear grossly intact.   The remaining visualized osseous and soft tissue structures demonstrate no appreciable abnormality.       4/28/14 MRI Cervical Spine Without Contrast    Narrative Study:   Cervical spine MRI without contrast.04/28/14 11:09:00  Technique:  Multiplanar non contrast images obtained on the cervical spine.   History: Left neck pain.  Left shoulder pain.  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  No abnormal signal in the cord.  The craniocervical junction is normal.  C2-3: Facet hypertrophy on the left side with mild left sided neural foraminal narrowing  C3-4: Disk space narrowing.  Broad-based osteophyte disk foot.  Bilateral uncovertebral-joint disease with moderate severe neural frontal narrowing.  C4-5: Disk space narrowing.  Broad-based osteophyte disk foot.  Bilateral uncovertebral joint disease with mild to severe neural foraminal narrowing.  Decreased CSF spaces anterior-posterior to the cord.  C5-6: Broad-based osteophyte and disk bulge.  Bilateral uncovertebral joint disease with mild to moderate bilateral bony neural frontal narrowing greater on the left side.  Disk protrusion into the left lateral recess.  C6-7: Disk space narrowing.  Broad-based osteophyte and disk bulge bilateral uncovertebral joint disease with mild neural foraminal narrowing.  C7-T1: Mild to space narrowing.  Mild uncovertebral joint disease.  No significant neural frontal narrowing.           Review of Systems:  CONSTITUTIONAL: patient denies any fever, chills, or weight loss  SKIN: patient denies any rash or itching  RESPIRATORY: patient denies having any shortness of breath  GASTROINTESTINAL: patient denies having any diarrhea, constipation, or bowel incontinence  GENITOURINARY: patient denies having any abnormal bladder function    MUSCULOSKELETAL:  - patient complains of the above noted pain/s (see chief pain complaint)    NEUROLOGICAL:   - pain  "as above  - strength in Upper extremities is decreased, BILATERALLY  - sensation in Upper extremities is abnormal, BILATERALLY  - patient denies any loss of bowel or bladder control      PSYCHIATRIC: patient denies any change in mood           Physical Exam:  Vitals:  BP (!) 183/99 (BP Location: Right arm, Patient Position: Sitting)   Pulse 81   Ht 5' 11" (1.803 m)   Wt 83.9 kg (185 lb)   BMI 25.80 kg/m²   (reviewed on 2/7/2020)    General: alert and oriented, in no apparent distress.  Gait: normal gait.  Skin: no rashes, no discoloration, no obvious lesions  HEENT: normocephalic, atraumatic. Pupils equal and round.  Cardiovascular: no significant peripheral edema present.  Respiratory: without use of accessory muscles of respiration.    Musculoskeletal - Cervical Spine:  - Somewhat limited ROM, mostly with rotation  - Pain on flexion of cervical spine: Absent   - Pain on extension of cervical spine: Present  - Cervical facet loading: Present  - TTP over the cervical facet joints: Present  - TTP over paracervical and trapezius muscles: Present bilaterally, tender to minimal palpation, worse on left   - Spurling's: Negative    Neuro - Upper Extremities:  - BUE Strength:R/L: D: 5/5; B: 5/5; T: 5/5; WF: 5/5; WE: 5/5; IO: 5/5  - Extremity Reflexes: Brisk and symmetric throughout  - Sensory: Sensation to light touch intact bilaterally      Psych:  Mood and affect is appropriate          Assessment:  John Laguna is a 64 y.o. year old male who is presenting with     Encounter Diagnoses   Name Primary?    Spondylosis of cervical region without myelopathy or radiculopathy Yes    DDD (degenerative disc disease), cervical     Cervical facet joint syndrome     Myofascial muscle pain     Neck and shoulder pain        Plan:  1. Interventional: TPI of paracervical / trapezius muscles today.  Procedure note listed below.    2. Pharmacologic:   - Refill Norco 7.5/325 mg Po BID PRN (60 tabs) x 2 months. Paper Rx " given today. Patient tolerating opioids with no side effects, obtaining good pain control with functional improvement. Dr. Malik previously had an extensive discussion with patient regarding the side effects, misuse/addiction potential from these medication.    - Continue Topamax 100mg QD.  - Opioid contract signed on 3/19/2018.     - [place]  reviewed and appropriate. Last filled 11-20-19 (over 2 months ago).  - Will order UDS next visit to ensure medication compliance.      3. Rehabilitative: Encouraged regular exercise. Encourage home neck exercises.     4. Diagnostic: None for now.    5. Follow up: 4 weeks for med refill.     - I discussed the risks, benefits, and alternatives to potential treatment options. All questions and concerns were fully addressed today in clinic. Dr. Malik was consulted regarding the patient plan and agrees.           >>UDS:  8-11-16 :: appropriate (+hydrocodone metabolites, +barbituates)  1/3/2017 :: appropriate  5/24/2017 :: appropriate  11/17/17 :: appropriate  7/13/2018 :: appropriate  3/8/2019 :: (no results - test requisition not marked)  5/3/2019 :: (no results - test requisition not marked)    >>Opioid Risk Tool:  1/3/2017 :: 6        Procedures:  Procedure: Trigger point injections  Muscle/s injected: Bilateral Upper Trapezius, Bilateral Middle Trapezius, Bilateral Levator Scapulae, Bilateral Rhomboids   Side: Bilateral     Description of Procedure:  Prior to starting this procedure, risks, benefits, complications, and alternatives were discussed with the patient. The patient agreed to proceed with the procedure. The procedural sites were identified, marked, and widely prepped with ChloraPrep.   A 27-gauge 1.5 inch needle was introduced into the above noted muscle/s. Following negative aspiration, a volume of 10mL solution comprised of 9 mL of 1% Lidocaine, and 1 mL of Depo-Medrol (40 mg/mL) was injected. No pain or paresthesia was noted on injection. This process was repeated at  multiple sites throughout the above noted muscle/s until the above noted solution was exhausted. The patient tolerated the procedure well. The injection sites were cleaned and bandaged as needed.      Complications: None

## 2020-03-09 RX ORDER — HYDROCODONE BITARTRATE AND ACETAMINOPHEN 7.5; 325 MG/1; MG/1
1 TABLET ORAL EVERY 12 HOURS PRN
Qty: 60 TABLET | Refills: 0 | Status: SHIPPED | OUTPATIENT
Start: 2020-05-06 | End: 2020-03-13

## 2020-03-09 RX ORDER — HYDROCODONE BITARTRATE AND ACETAMINOPHEN 7.5; 325 MG/1; MG/1
1 TABLET ORAL EVERY 12 HOURS PRN
Qty: 60 TABLET | Refills: 0 | Status: SHIPPED | OUTPATIENT
Start: 2020-06-05 | End: 2020-03-13

## 2020-03-09 RX ORDER — HYDROCODONE BITARTRATE AND ACETAMINOPHEN 7.5; 325 MG/1; MG/1
1 TABLET ORAL EVERY 12 HOURS PRN
Qty: 60 TABLET | Refills: 0 | Status: SHIPPED | OUTPATIENT
Start: 2020-04-06 | End: 2020-03-13

## 2020-03-10 NOTE — TELEPHONE ENCOUNTER
----- Message from Shanna Rosales sent at 3/10/2020  3:18 PM CDT -----  Contact: Self- [phone]  Pt would like a call from nurse in regards to appt on 3/13/20. Please call back at [phone].       Thank You,   Shanna Rosales

## 2020-04-16 RX ORDER — METHOCARBAMOL 500 MG/1
500 TABLET, FILM COATED ORAL 2 TIMES DAILY PRN
Qty: 60 TABLET | Refills: 0 | Status: SHIPPED | OUTPATIENT
Start: 2020-04-16 | End: 2020-06-25 | Stop reason: SDUPTHER

## 2020-04-17 ENCOUNTER — OFFICE VISIT (OUTPATIENT)
Dept: PAIN MEDICINE | Facility: CLINIC | Age: 65
End: 2020-04-17
Payer: MEDICAID

## 2020-04-17 DIAGNOSIS — M47.812 CERVICAL FACET JOINT SYNDROME: Primary | ICD-10-CM

## 2020-04-17 DIAGNOSIS — M79.18 MYOFASCIAL MUSCLE PAIN: ICD-10-CM

## 2020-04-17 DIAGNOSIS — M25.519 NECK AND SHOULDER PAIN: ICD-10-CM

## 2020-04-17 DIAGNOSIS — M79.641 PAIN IN BOTH HANDS: ICD-10-CM

## 2020-04-17 DIAGNOSIS — M50.30 DDD (DEGENERATIVE DISC DISEASE), CERVICAL: ICD-10-CM

## 2020-04-17 DIAGNOSIS — M79.642 PAIN IN BOTH HANDS: ICD-10-CM

## 2020-04-17 DIAGNOSIS — M54.2 NECK AND SHOULDER PAIN: ICD-10-CM

## 2020-04-17 PROCEDURE — 99441 PR PHYSICIAN TELEPHONE EVALUATION 5-10 MIN: ICD-10-PCS | Mod: 95,,, | Performed by: ANESTHESIOLOGY

## 2020-04-17 PROCEDURE — 99441 PR PHYSICIAN TELEPHONE EVALUATION 5-10 MIN: CPT | Mod: 95,,, | Performed by: ANESTHESIOLOGY

## 2020-04-17 RX ORDER — HYDROCODONE BITARTRATE AND ACETAMINOPHEN 7.5; 325 MG/1; MG/1
1 TABLET ORAL EVERY 12 HOURS PRN
Qty: 45 TABLET | Refills: 0 | Status: SHIPPED | OUTPATIENT
Start: 2020-04-17 | End: 2020-05-14 | Stop reason: SDUPTHER

## 2020-04-19 NOTE — PROGRESS NOTES
Telephone Encounter    Chief Pain Complaint:  Neck Pain, Bilateral Arm Pain, Shoulder Pain       History of Present Illness:  This patient is a 64 y.o. male who presents today complaining of the above noted pain/s. The patient describes this pain as follows.    - duration of pain: pain for years   - timing: constant   - character: sharp, aching  - radiating, dermatomal: extends to the shoulders b/l, nondermatomal  - antecedent trauma, prior spinal surgery: no prior trauma, no prior spinal surgery, bilateral shoulder surgery  - pertinent negatives: No fever, No chills, No weight loss, No bladder dysfunction, No bowel dysfunction, No saddle anesthesia  - pertinent positives: generalized nonspecific Upper Extremity weakness bilaterally    - medications, other therapies tried (physical therapy, injections):     >> Norco, flexeril, gabapentin, Mobic, Topamax, Fioricet    >> Has previously undergone Physical Therapy - which he does not find helpful but does exercises at home    >> Has previously undergone spinal injection/s & trigger point injections          IMAGING / Labs / Studies (reviewed on 4/19/2020):    5-12-16 XR Lumbar and Thoracic:  Findings: There is slight levoscoliotic curvature of the lumbar spine.  There is grade 2 anterolisthesis of L5 on S1 with minimal grade 1 anterolisthesis of L4 on L5 and slight retrolisthesis of L3 on L4.  Moderate to severe disc height loss present at L5-S1 with mild disc height loss at L3-L4 and L4-L5. Bilateral facet arthropathy present at L4-L5 and L5-S1. No acute fractures visualized.  The remaining visualized osseous and soft tissue structures demonstrate no appreciable abnormality.  Findings: There is mild dextroscoliotic curvature of the thoracic spine.  There are multiple mild age-indeterminate compression deformities involving the mid and lower thoracic spine with suspected involvement of T6-T11.  There is mild multilevel disc height loss noted throughout the thoracic  spine with associated disc osteophyte complex production. Posterior elements appear grossly intact.   The remaining visualized osseous and soft tissue structures demonstrate no appreciable abnormality.       4/28/14 MRI Cervical Spine Without Contrast    Narrative Study:   Cervical spine MRI without contrast.04/28/14 11:09:00  Technique:  Multiplanar non contrast images obtained on the cervical spine.   History: Left neck pain.  Left shoulder pain.  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  No abnormal signal in the cord.  The craniocervical junction is normal.  C2-3: Facet hypertrophy on the left side with mild left sided neural foraminal narrowing  C3-4: Disk space narrowing.  Broad-based osteophyte disk foot.  Bilateral uncovertebral-joint disease with moderate severe neural frontal narrowing.  C4-5: Disk space narrowing.  Broad-based osteophyte disk foot.  Bilateral uncovertebral joint disease with mild to severe neural foraminal narrowing.  Decreased CSF spaces anterior-posterior to the cord.  C5-6: Broad-based osteophyte and disk bulge.  Bilateral uncovertebral joint disease with mild to moderate bilateral bony neural frontal narrowing greater on the left side.  Disk protrusion into the left lateral recess.  C6-7: Disk space narrowing.  Broad-based osteophyte and disk bulge bilateral uncovertebral joint disease with mild neural foraminal narrowing.  C7-T1: Mild to space narrowing.  Mild uncovertebral joint disease.  No significant neural frontal narrowing.           Review of Systems:  CONSTITUTIONAL: patient denies any fever, chills, or weight loss  SKIN: patient denies any rash or itching  RESPIRATORY: patient denies having any shortness of breath  GASTROINTESTINAL: patient denies having any diarrhea, constipation, or bowel incontinence  GENITOURINARY: patient denies having any abnormal bladder function    MUSCULOSKELETAL:  - patient complains of the above noted pain/s (see chief pain  complaint)    NEUROLOGICAL:   - pain as above  - strength in Upper extremities is decreased, BILATERALLY  - sensation in Upper extremities is abnormal, BILATERALLY  - patient denies any loss of bowel or bladder control      PSYCHIATRIC: patient denies any change in mood           Physical Exam:    Telephone Encounter Physical Exam from last visit    Vitals:  There were no vitals taken for this visit.  (reviewed on 4/19/2020)    General: alert and oriented, in no apparent distress.  Gait: normal gait.  Skin: no rashes, no discoloration, no obvious lesions  HEENT: normocephalic, atraumatic. Pupils equal and round.  Cardiovascular: no significant peripheral edema present.  Respiratory: without use of accessory muscles of respiration.    Musculoskeletal - Cervical Spine:  - Somewhat limited ROM, mostly with rotation  - Pain on flexion of cervical spine: Absent   - Pain on extension of cervical spine: Present  - Cervical facet loading: Present  - TTP over the cervical facet joints: Present  - TTP over paracervical and trapezius muscles: Present bilaterally, tender to minimal palpation, worse on left   - Spurling's: Negative    Neuro - Upper Extremities:  - BUE Strength:R/L: D: 5/5; B: 5/5; T: 5/5; WF: 5/5; WE: 5/5; IO: 5/5  - Extremity Reflexes: Brisk and symmetric throughout  - Sensory: Sensation to light touch intact bilaterally      Psych:  Mood and affect is appropriate          Assessment:  John Laguna is a 64 y.o. year old male who is presenting with     Encounter Diagnoses   Name Primary?    Cervical facet joint syndrome Yes    Myofascial muscle pain     Neck and shoulder pain     Pain in both hands     DDD (degenerative disc disease), cervical        Plan:  1. Interventional: None for now.     2. Pharmacologic:   - Refill Norco 7.5/325 mg Po BID PRN (45 tabs) x 1 months. Electronic Rx done. Patient tolerating opioids with no side effects, obtaining good pain control with functional improvement. Had  an extensive discussion with patient regarding the side effects, misuse/addiction potential from these medication.    - Continue Topamax 100mg QD.  - Opioid contract signed on 3/19/2018.     - [place]  reviewed and appropriate. Last filled 3-07-20  - Will order UDS next visit to ensure medication compliance.      3. Rehabilitative: Encouraged regular exercise. Encourage home neck exercises.     4. Diagnostic: None for now.    5. Follow up: 4 weeks for med refill.       >>UDS:  8-11-16 :: appropriate (+hydrocodone metabolites, +barbituates)  1/3/2017 :: appropriate  5/24/2017 :: appropriate  11/17/17 :: appropriate  7/13/2018 :: appropriate  3/8/2019 :: (no results - test requisition not marked)  5/3/2019 :: (no results - test requisition not marked)    >>Opioid Risk Tool:  1/3/2017 :: 6

## 2020-05-14 ENCOUNTER — TELEPHONE (OUTPATIENT)
Dept: PAIN MEDICINE | Facility: CLINIC | Age: 65
End: 2020-05-14

## 2020-05-14 RX ORDER — HYDROCODONE BITARTRATE AND ACETAMINOPHEN 7.5; 325 MG/1; MG/1
1 TABLET ORAL EVERY 12 HOURS PRN
Qty: 45 TABLET | Refills: 0 | Status: SHIPPED | OUTPATIENT
Start: 2020-05-14 | End: 2020-06-13

## 2020-05-14 NOTE — TELEPHONE ENCOUNTER
----- Message from Janneth Jalloh sent at 5/14/2020  3:57 PM CDT -----  Contact: Pt  Patient needs a refill for the HYDROcodone-acetaminophen (NORCO) 7.5-325 mg per tablet     [id number] - [place], [place] - [address]  [address] [facility] [address]  Phone: [phone] Fax: [phone]

## 2020-05-14 NOTE — TELEPHONE ENCOUNTER
Contacted pt. Informed pt that has left for the day but I will let Dr baker know. Pt was able to verbalize all understanding. All questions answered.//lp

## 2020-06-19 RX ORDER — HYDROCODONE BITARTRATE AND ACETAMINOPHEN 7.5; 325 MG/1; MG/1
1 TABLET ORAL EVERY 12 HOURS PRN
Qty: 60 TABLET | Refills: 0 | Status: SHIPPED | OUTPATIENT
Start: 2020-06-19 | End: 2020-06-19

## 2020-06-19 RX ORDER — HYDROCODONE BITARTRATE AND ACETAMINOPHEN 7.5; 325 MG/1; MG/1
TABLET ORAL
Qty: 75 TABLET | Refills: 0 | Status: SHIPPED | OUTPATIENT
Start: 2020-07-26 | End: 2020-06-19

## 2020-06-19 RX ORDER — HYDROCODONE BITARTRATE AND ACETAMINOPHEN 7.5; 325 MG/1; MG/1
TABLET ORAL
Qty: 45 TABLET | Refills: 0 | Status: SHIPPED | OUTPATIENT
Start: 2020-07-26 | End: 2020-08-17 | Stop reason: SDUPTHER

## 2020-06-19 RX ORDER — HYDROCODONE BITARTRATE AND ACETAMINOPHEN 7.5; 325 MG/1; MG/1
TABLET ORAL
Qty: 75 TABLET | Refills: 0 | Status: SHIPPED | OUTPATIENT
Start: 2020-06-19 | End: 2020-06-19

## 2020-06-19 RX ORDER — HYDROCODONE BITARTRATE AND ACETAMINOPHEN 7.5; 325 MG/1; MG/1
TABLET ORAL
Qty: 45 TABLET | Refills: 0 | Status: SHIPPED | OUTPATIENT
Start: 2020-06-26 | End: 2020-06-25

## 2020-06-19 RX ORDER — HYDROCODONE BITARTRATE AND ACETAMINOPHEN 7.5; 325 MG/1; MG/1
1 TABLET ORAL EVERY 12 HOURS PRN
Qty: 60 TABLET | Refills: 0 | Status: SHIPPED | OUTPATIENT
Start: 2020-07-26 | End: 2020-06-19

## 2020-06-24 ENCOUNTER — TELEPHONE (OUTPATIENT)
Dept: PAIN MEDICINE | Facility: CLINIC | Age: 65
End: 2020-06-24

## 2020-06-24 NOTE — TELEPHONE ENCOUNTER
----- Message from Kamilah Kerr sent at 6/24/2020 10:44 AM CDT -----  Regarding: Appt  Pt is requesting call back in regards to questions about getting earlier appt        Pls call back at [phone]

## 2020-06-25 ENCOUNTER — OFFICE VISIT (OUTPATIENT)
Dept: PAIN MEDICINE | Facility: CLINIC | Age: 65
End: 2020-06-25
Payer: MEDICARE

## 2020-06-25 VITALS
HEIGHT: 71 IN | BODY MASS INDEX: 26.6 KG/M2 | RESPIRATION RATE: 18 BRPM | WEIGHT: 190 LBS | SYSTOLIC BLOOD PRESSURE: 166 MMHG | DIASTOLIC BLOOD PRESSURE: 91 MMHG | HEART RATE: 88 BPM

## 2020-06-25 DIAGNOSIS — M54.2 NECK AND SHOULDER PAIN: ICD-10-CM

## 2020-06-25 DIAGNOSIS — M79.18 MYOFASCIAL MUSCLE PAIN: ICD-10-CM

## 2020-06-25 DIAGNOSIS — M25.519 NECK AND SHOULDER PAIN: ICD-10-CM

## 2020-06-25 DIAGNOSIS — M50.30 DDD (DEGENERATIVE DISC DISEASE), CERVICAL: ICD-10-CM

## 2020-06-25 DIAGNOSIS — M47.812 CERVICAL FACET JOINT SYNDROME: Primary | ICD-10-CM

## 2020-06-25 PROCEDURE — 20553 PR INJECT TRIGGER POINTS, > 3: ICD-10-PCS | Mod: S$PBB,,, | Performed by: PHYSICIAN ASSISTANT

## 2020-06-25 PROCEDURE — 99999 PR PBB SHADOW E&M-EST. PATIENT-LVL IV: ICD-10-PCS | Mod: PBBFAC,,, | Performed by: PHYSICIAN ASSISTANT

## 2020-06-25 PROCEDURE — 99214 PR OFFICE/OUTPT VISIT, EST, LEVL IV, 30-39 MIN: ICD-10-PCS | Mod: 25,S$PBB,, | Performed by: PHYSICIAN ASSISTANT

## 2020-06-25 PROCEDURE — 20553 NJX 1/MLT TRIGGER POINTS 3/>: CPT | Mod: PBBFAC | Performed by: PHYSICIAN ASSISTANT

## 2020-06-25 PROCEDURE — 99999 PR PBB SHADOW E&M-EST. PATIENT-LVL IV: CPT | Mod: PBBFAC,,, | Performed by: PHYSICIAN ASSISTANT

## 2020-06-25 PROCEDURE — 99214 OFFICE O/P EST MOD 30 MIN: CPT | Mod: 25,S$PBB,, | Performed by: PHYSICIAN ASSISTANT

## 2020-06-25 PROCEDURE — 20553 NJX 1/MLT TRIGGER POINTS 3/>: CPT | Mod: S$PBB,,, | Performed by: PHYSICIAN ASSISTANT

## 2020-06-25 PROCEDURE — 99214 OFFICE O/P EST MOD 30 MIN: CPT | Mod: PBBFAC,25 | Performed by: PHYSICIAN ASSISTANT

## 2020-06-25 RX ORDER — HYDROCODONE BITARTRATE AND ACETAMINOPHEN 7.5; 325 MG/1; MG/1
TABLET ORAL
Qty: 45 TABLET | Refills: 0 | Status: SHIPPED | OUTPATIENT
Start: 2020-06-25 | End: 2020-08-17 | Stop reason: SDUPTHER

## 2020-06-25 RX ORDER — MELOXICAM 7.5 MG/1
7.5 TABLET ORAL 2 TIMES DAILY WITH MEALS
Qty: 60 TABLET | Refills: 0 | Status: SHIPPED | OUTPATIENT
Start: 2020-06-25 | End: 2020-07-25

## 2020-06-25 RX ORDER — METHYLPREDNISOLONE ACETATE 40 MG/ML
40 INJECTION, SUSPENSION INTRA-ARTICULAR; INTRALESIONAL; INTRAMUSCULAR; SOFT TISSUE
Status: COMPLETED | OUTPATIENT
Start: 2020-06-25 | End: 2020-06-25

## 2020-06-25 RX ORDER — METHOCARBAMOL 500 MG/1
500 TABLET, FILM COATED ORAL 2 TIMES DAILY PRN
Qty: 60 TABLET | Refills: 0 | Status: SHIPPED | OUTPATIENT
Start: 2020-06-25 | End: 2020-07-21

## 2020-06-25 RX ADMIN — METHYLPREDNISOLONE ACETATE 40 MG: 40 INJECTION, SUSPENSION INTRALESIONAL; INTRAMUSCULAR; INTRASYNOVIAL; SOFT TISSUE at 08:06

## 2020-06-25 NOTE — PROGRESS NOTES
Chief Pain Complaint:  Neck Pain, Bilateral Arm Pain, Shoulder Pain       History of Present Illness:  This patient is a 65 y.o. male who presents today complaining of the above noted pain/s. The patient describes this pain as follows.    - duration of pain: pain for years   - timing: constant   - character: sharp, aching  - radiating, dermatomal: extends to the shoulders b/l, nondermatomal  - antecedent trauma, prior spinal surgery: no prior trauma, no prior spinal surgery, bilateral shoulder surgery  - pertinent negatives: No fever, No chills, No weight loss, No bladder dysfunction, No bowel dysfunction, No saddle anesthesia  - pertinent positives: generalized nonspecific Upper Extremity weakness bilaterally    - medications, other therapies tried (physical therapy, injections):     >> Norco, flexeril, gabapentin, Mobic, Topamax, Fioricet    >> Has previously undergone Physical Therapy - which he does not find helpful but does exercises at home    >> Has previously undergone spinal injection/s & trigger point injections          IMAGING / Labs / Studies (reviewed on 6/25/2020):    5-12-16 XR Lumbar and Thoracic:  Findings: There is slight levoscoliotic curvature of the lumbar spine.  There is grade 2 anterolisthesis of L5 on S1 with minimal grade 1 anterolisthesis of L4 on L5 and slight retrolisthesis of L3 on L4.  Moderate to severe disc height loss present at L5-S1 with mild disc height loss at L3-L4 and L4-L5. Bilateral facet arthropathy present at L4-L5 and L5-S1. No acute fractures visualized.  The remaining visualized osseous and soft tissue structures demonstrate no appreciable abnormality.  Findings: There is mild dextroscoliotic curvature of the thoracic spine.  There are multiple mild age-indeterminate compression deformities involving the mid and lower thoracic spine with suspected involvement of T6-T11.  There is mild multilevel disc height loss noted throughout the thoracic spine with associated disc  osteophyte complex production. Posterior elements appear grossly intact.   The remaining visualized osseous and soft tissue structures demonstrate no appreciable abnormality.       4/28/14 MRI Cervical Spine Without Contrast    Narrative Study:   Cervical spine MRI without contrast.04/28/14 11:09:00  Technique:  Multiplanar non contrast images obtained on the cervical spine.   History: Left neck pain.  Left shoulder pain.  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  No abnormal signal in the cord.  The craniocervical junction is normal.  C2-3: Facet hypertrophy on the left side with mild left sided neural foraminal narrowing  C3-4: Disk space narrowing.  Broad-based osteophyte disk foot.  Bilateral uncovertebral-joint disease with moderate severe neural frontal narrowing.  C4-5: Disk space narrowing.  Broad-based osteophyte disk foot.  Bilateral uncovertebral joint disease with mild to severe neural foraminal narrowing.  Decreased CSF spaces anterior-posterior to the cord.  C5-6: Broad-based osteophyte and disk bulge.  Bilateral uncovertebral joint disease with mild to moderate bilateral bony neural frontal narrowing greater on the left side.  Disk protrusion into the left lateral recess.  C6-7: Disk space narrowing.  Broad-based osteophyte and disk bulge bilateral uncovertebral joint disease with mild neural foraminal narrowing.  C7-T1: Mild to space narrowing.  Mild uncovertebral joint disease.  No significant neural frontal narrowing.           Review of Systems:  CONSTITUTIONAL: patient denies any fever, chills, or weight loss  SKIN: patient denies any rash or itching  RESPIRATORY: patient denies having any shortness of breath  GASTROINTESTINAL: patient denies having any diarrhea, constipation, or bowel incontinence  GENITOURINARY: patient denies having any abnormal bladder function    MUSCULOSKELETAL:  - patient complains of the above noted pain/s (see chief pain complaint)    NEUROLOGICAL:   - pain  "as above  - strength in Upper extremities is decreased, BILATERALLY  - sensation in Upper extremities is abnormal, BILATERALLY  - patient denies any loss of bowel or bladder control      PSYCHIATRIC: patient denies any change in mood           Physical Exam:  Vitals:  BP (!) 166/91 (BP Location: Right arm, Patient Position: Sitting, BP Method: Medium (Automatic))   Pulse 88   Resp 18   Ht 5' 11" (1.803 m)   Wt 86.2 kg (190 lb)   BMI 26.50 kg/m²   (reviewed on 6/25/2020)    General: alert and oriented, in no apparent distress.  Gait: normal gait.  Skin: no rashes, no discoloration, no obvious lesions  HEENT: normocephalic, atraumatic. Pupils equal and round.  Cardiovascular: no significant peripheral edema present.  Respiratory: without use of accessory muscles of respiration.    Musculoskeletal - Cervical Spine:  - Pain on flexion of cervical spine: Present   - Pain on extension of cervical spine: Present  - Cervical facet loading: Present  - TTP over the cervical facet joints: Present  - TTP over paracervical and trapezius muscles: Present bilaterally, tender to minimal palpation, worse on left   - Spurling's: Negative    Neuro - Upper Extremities:  - BUE Strength:R/L: D: 5/5; B: 5/5; T: 5/5; WF: 5/5; WE: 5/5; IO: 5/5  - Extremity Reflexes: Brisk and symmetric throughout  - Sensory: Sensation to light touch intact bilaterally      Psych:  Mood and affect is appropriate          Assessment:  John Laguna is a 65 y.o. year old male who is presenting with     Encounter Diagnoses   Name Primary?    Cervical facet joint syndrome Yes    Myofascial muscle pain     Neck and shoulder pain     DDD (degenerative disc disease), cervical        Plan:  1. Interventional: TPI of paracervical / trapezius muscles today.  Procedure note listed below.    2. Pharmacologic:   - Refill Norco 7.5/325 mg Po BID PRN (60 tabs) x 2 months. Paper Rx given today. Patient tolerating opioids with no side effects, obtaining good " pain control with functional improvement. Dr. Malik previously had an extensive discussion with patient regarding the side effects, misuse/addiction potential from these medication.    - Refill Robaxin 500mg BID PRN and Mobic 7.5mg BID PRN.   - Continue Topamax 100mg QD.  - Opioid contract signed on 3/19/2018.     - [place]  reviewed and appropriate. Last filled 5-14-20 (over 1 month ago).  - Will order UDS today to ensure medication compliance.      3. Rehabilitative: Encouraged regular exercise. Encourage home neck exercises.     4. Diagnostic: None for now.    5. Follow up: 8 weeks for med refill.     - I discussed the risks, benefits, and alternatives to potential treatment options. All questions and concerns were fully addressed today in clinic.             >>UDS:  8-11-16 :: appropriate (+hydrocodone metabolites, +barbituates)  1/3/2017 :: appropriate  5/24/2017 :: appropriate  11/17/17 :: appropriate  7/13/2018 :: appropriate  3/8/2019 :: (no results - test requisition not marked)  5/3/2019 :: (no results - test requisition not marked)  6/25/2020 :: pending     >>Opioid Risk Tool:  1/3/2017 :: 6        Procedures:  Procedure: Trigger point injections  Muscle/s injected: Bilateral Upper Trapezius, Bilateral Middle Trapezius, Bilateral Levator Scapulae, Bilateral Rhomboids   Side: Bilateral     Description of Procedure:  Prior to starting this procedure, risks, benefits, complications, and alternatives were discussed with the patient. The patient agreed to proceed with the procedure. The procedural sites were identified, marked, and widely prepped with ChloraPrep.   A 27-gauge 1.5 inch needle was introduced into the above noted muscle/s. Following negative aspiration, a volume of 10mL solution comprised of 9 mL of 1% Lidocaine, and 1 mL of Depo-Medrol (40 mg/mL) was injected. No pain or paresthesia was noted on injection. This process was repeated at multiple sites throughout the above noted muscle/s until the  above noted solution was exhausted. The patient tolerated the procedure well. The injection sites were cleaned and bandaged as needed.      Complications: None

## 2020-06-26 ENCOUNTER — TELEPHONE (OUTPATIENT)
Dept: PAIN MEDICINE | Facility: CLINIC | Age: 65
End: 2020-06-26

## 2020-07-21 ENCOUNTER — TELEPHONE (OUTPATIENT)
Dept: PAIN MEDICINE | Facility: CLINIC | Age: 65
End: 2020-07-21

## 2020-07-21 RX ORDER — TIZANIDINE 4 MG/1
TABLET ORAL
Qty: 60 TABLET | Refills: 0 | Status: SHIPPED | OUTPATIENT
Start: 2020-07-21 | End: 2020-08-20

## 2020-07-21 NOTE — TELEPHONE ENCOUNTER
----- Message from Jeanne Paredes sent at 7/21/2020 11:37 AM CDT -----  Contact: self/  Would like to consult with nurse regarding his medication(HYDROcodone-acetaminophen (NORCO) 7.5-325 mg per tablet0, please call back at [phone]. Thanks/ar

## 2020-08-17 RX ORDER — HYDROCODONE BITARTRATE AND ACETAMINOPHEN 7.5; 325 MG/1; MG/1
TABLET ORAL
Qty: 45 TABLET | Refills: 0 | Status: SHIPPED | OUTPATIENT
Start: 2020-08-25 | End: 2020-11-06 | Stop reason: SDUPTHER

## 2020-08-17 RX ORDER — HYDROCODONE BITARTRATE AND ACETAMINOPHEN 7.5; 325 MG/1; MG/1
TABLET ORAL
Qty: 45 TABLET | Refills: 0 | Status: SHIPPED | OUTPATIENT
Start: 2020-09-24 | End: 2020-11-20 | Stop reason: SDUPTHER

## 2020-08-28 ENCOUNTER — OFFICE VISIT (OUTPATIENT)
Dept: PAIN MEDICINE | Facility: CLINIC | Age: 65
End: 2020-08-28
Payer: MEDICARE

## 2020-08-28 ENCOUNTER — TELEPHONE (OUTPATIENT)
Dept: PAIN MEDICINE | Facility: CLINIC | Age: 65
End: 2020-08-28

## 2020-08-28 VITALS
HEIGHT: 71 IN | SYSTOLIC BLOOD PRESSURE: 171 MMHG | HEART RATE: 94 BPM | BODY MASS INDEX: 26.6 KG/M2 | WEIGHT: 190 LBS | RESPIRATION RATE: 20 BRPM | DIASTOLIC BLOOD PRESSURE: 94 MMHG

## 2020-08-28 DIAGNOSIS — M47.812 CERVICAL FACET JOINT SYNDROME: Primary | ICD-10-CM

## 2020-08-28 DIAGNOSIS — M79.18 MYOFASCIAL MUSCLE PAIN: ICD-10-CM

## 2020-08-28 DIAGNOSIS — M25.519 NECK AND SHOULDER PAIN: ICD-10-CM

## 2020-08-28 DIAGNOSIS — M79.641 PAIN IN BOTH HANDS: ICD-10-CM

## 2020-08-28 DIAGNOSIS — M50.30 DDD (DEGENERATIVE DISC DISEASE), CERVICAL: ICD-10-CM

## 2020-08-28 DIAGNOSIS — M54.2 NECK AND SHOULDER PAIN: ICD-10-CM

## 2020-08-28 DIAGNOSIS — M79.642 PAIN IN BOTH HANDS: ICD-10-CM

## 2020-08-28 PROCEDURE — 99214 PR OFFICE/OUTPT VISIT, EST, LEVL IV, 30-39 MIN: ICD-10-PCS | Mod: 25,S$PBB,, | Performed by: PHYSICIAN ASSISTANT

## 2020-08-28 PROCEDURE — 99999 PR PBB SHADOW E&M-EST. PATIENT-LVL IV: ICD-10-PCS | Mod: PBBFAC,,, | Performed by: PHYSICIAN ASSISTANT

## 2020-08-28 PROCEDURE — 99999 PR PBB SHADOW E&M-EST. PATIENT-LVL IV: CPT | Mod: PBBFAC,,, | Performed by: PHYSICIAN ASSISTANT

## 2020-08-28 PROCEDURE — 99214 OFFICE O/P EST MOD 30 MIN: CPT | Mod: 25,S$PBB,, | Performed by: PHYSICIAN ASSISTANT

## 2020-08-28 PROCEDURE — 96372 PR INJECTION,THERAP/PROPH/DIAG2ST, IM OR SUBCUT: ICD-10-PCS | Mod: S$PBB,,, | Performed by: PHYSICIAN ASSISTANT

## 2020-08-28 PROCEDURE — 96372 THER/PROPH/DIAG INJ SC/IM: CPT | Mod: S$PBB,,, | Performed by: PHYSICIAN ASSISTANT

## 2020-08-28 PROCEDURE — 99214 OFFICE O/P EST MOD 30 MIN: CPT | Mod: PBBFAC | Performed by: PHYSICIAN ASSISTANT

## 2020-08-28 RX ORDER — METHYLPREDNISOLONE ACETATE 40 MG/ML
40 INJECTION, SUSPENSION INTRA-ARTICULAR; INTRALESIONAL; INTRAMUSCULAR; SOFT TISSUE
Status: COMPLETED | OUTPATIENT
Start: 2020-08-28 | End: 2020-08-28

## 2020-08-28 RX ORDER — LIDOCAINE HYDROCHLORIDE 10 MG/ML
9 INJECTION INFILTRATION; PERINEURAL
Status: COMPLETED | OUTPATIENT
Start: 2020-08-28 | End: 2020-08-28

## 2020-08-28 RX ADMIN — METHYLPREDNISOLONE ACETATE 40 MG: 40 INJECTION, SUSPENSION INTRALESIONAL; INTRAMUSCULAR; INTRASYNOVIAL; SOFT TISSUE at 09:08

## 2020-08-28 RX ADMIN — LIDOCAINE HYDROCHLORIDE 9 ML: 10 INJECTION, SOLUTION INFILTRATION; PERINEURAL at 09:08

## 2020-08-28 NOTE — PROGRESS NOTES
Chief Pain Complaint:  Neck Pain, Bilateral Arm Pain, Shoulder Pain       History of Present Illness:  This patient is a 65 y.o. male who presents today complaining of the above noted pain/s. The patient describes this pain as follows.    - duration of pain: pain for years   - timing: constant   - character: sharp, aching  - radiating, dermatomal: extends to the shoulders b/l, nondermatomal  - antecedent trauma, prior spinal surgery: no prior trauma, no prior spinal surgery, bilateral shoulder surgery  - pertinent negatives: No fever, No chills, No weight loss, No bladder dysfunction, No bowel dysfunction, No saddle anesthesia  - pertinent positives: generalized nonspecific Upper Extremity weakness bilaterally    - medications, other therapies tried (physical therapy, injections):     >> Norco, flexeril, gabapentin, Mobic, Topamax, Fioricet    >> Has previously undergone Physical Therapy - which he does not find helpful but does exercises at home    >> Has previously undergone spinal injection/s & trigger point injections          IMAGING / Labs / Studies (reviewed on 8/28/2020):    5-12-16 XR Lumbar and Thoracic:  Findings: There is slight levoscoliotic curvature of the lumbar spine.  There is grade 2 anterolisthesis of L5 on S1 with minimal grade 1 anterolisthesis of L4 on L5 and slight retrolisthesis of L3 on L4.  Moderate to severe disc height loss present at L5-S1 with mild disc height loss at L3-L4 and L4-L5. Bilateral facet arthropathy present at L4-L5 and L5-S1. No acute fractures visualized.  The remaining visualized osseous and soft tissue structures demonstrate no appreciable abnormality.  Findings: There is mild dextroscoliotic curvature of the thoracic spine.  There are multiple mild age-indeterminate compression deformities involving the mid and lower thoracic spine with suspected involvement of T6-T11.  There is mild multilevel disc height loss noted throughout the thoracic spine with associated disc  osteophyte complex production. Posterior elements appear grossly intact.   The remaining visualized osseous and soft tissue structures demonstrate no appreciable abnormality.       4/28/14 MRI Cervical Spine Without Contrast    Narrative Study:   Cervical spine MRI without contrast.04/28/14 11:09:00  Technique:  Multiplanar non contrast images obtained on the cervical spine.   History: Left neck pain.  Left shoulder pain.  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  No abnormal signal in the cord.  The craniocervical junction is normal.  C2-3: Facet hypertrophy on the left side with mild left sided neural foraminal narrowing  C3-4: Disk space narrowing.  Broad-based osteophyte disk foot.  Bilateral uncovertebral-joint disease with moderate severe neural frontal narrowing.  C4-5: Disk space narrowing.  Broad-based osteophyte disk foot.  Bilateral uncovertebral joint disease with mild to severe neural foraminal narrowing.  Decreased CSF spaces anterior-posterior to the cord.  C5-6: Broad-based osteophyte and disk bulge.  Bilateral uncovertebral joint disease with mild to moderate bilateral bony neural frontal narrowing greater on the left side.  Disk protrusion into the left lateral recess.  C6-7: Disk space narrowing.  Broad-based osteophyte and disk bulge bilateral uncovertebral joint disease with mild neural foraminal narrowing.  C7-T1: Mild to space narrowing.  Mild uncovertebral joint disease.  No significant neural frontal narrowing.           Review of Systems:  CONSTITUTIONAL: patient denies any fever, chills, or weight loss  SKIN: patient denies any rash or itching  RESPIRATORY: patient denies having any shortness of breath  GASTROINTESTINAL: patient denies having any diarrhea, constipation, or bowel incontinence  GENITOURINARY: patient denies having any abnormal bladder function    MUSCULOSKELETAL:  - patient complains of the above noted pain/s (see chief pain complaint)    NEUROLOGICAL:   - pain  "as above  - strength in Upper extremities is decreased, BILATERALLY  - sensation in Upper extremities is abnormal, BILATERALLY  - patient denies any loss of bowel or bladder control      PSYCHIATRIC: patient denies any change in mood           Physical Exam:  Vitals:    08/28/20 0725   BP: (!) 171/94   Pulse: 94   Resp: 20   Weight: 86.2 kg (190 lb)   Height: 5' 11" (1.803 m)   PainSc: 10-Worst pain ever   PainLoc: Neck    (reviewed on 8/28/2020)    General: alert and oriented, in no apparent distress.  Gait: normal gait.  Skin: no rashes, no discoloration, no obvious lesions  HEENT: normocephalic, atraumatic. Pupils equal and round.  Cardiovascular: no significant peripheral edema present.  Respiratory: without use of accessory muscles of respiration.    Musculoskeletal - Cervical Spine:  - Pain on flexion of cervical spine: Present   - Pain on extension of cervical spine: Present  - Cervical facet loading: Present  - TTP over the cervical facet joints: Present  - TTP over paracervical and trapezius muscles: Present bilaterally, tender to minimal palpation, worse on left   - Spurling's: Negative    Neuro - Upper Extremities:  - BUE Strength:R/L: D: 5/5; B: 5/5; T: 5/5; WF: 5/5; WE: 5/5; IO: 5/5  - Extremity Reflexes: Brisk and symmetric throughout  - Sensory: Sensation to light touch intact bilaterally      Psych:  Mood and affect is appropriate          Assessment:  John Laguna is a 65 y.o. year old male who is presenting with     Encounter Diagnoses   Name Primary?    Cervical facet joint syndrome Yes    Myofascial muscle pain     Neck and shoulder pain     DDD (degenerative disc disease), cervical     Pain in both hands        Plan:  1. Interventional: TPI of paracervical / trapezius muscles today.  Procedure note listed below.    2. Pharmacologic:   - Refill Norco 7.5/325 mg Po BID PRN (60 tabs) x 2 months. Paper Rx given today. Patient tolerating opioids with no side effects, obtaining good pain " control with functional improvement. Dr. Malik previously had an extensive discussion with patient regarding the side effects, misuse/addiction potential from these medication.    - Refill Zanaflex 4mg to take 1/2 to 1 tab BID PRN (60 tablets). Patient understands this may cause drowsiness.   - Continue Topamax 100mg QD.  - Opioid contract signed on 3/19/2018.     - [place]  reviewed and appropriate. Last filled 7-26-20.  - Will order UDS next visit to ensure medication compliance.      3. Rehabilitative: Encouraged regular exercise. Encourage home neck exercises.     4. Diagnostic: None for now.    5. Follow up: 8 weeks for med refill.     - I discussed the risks, benefits, and alternatives to potential treatment options. All questions and concerns were fully addressed today in clinic.             >>UDS:  8-11-16 :: appropriate (+hydrocodone metabolites, +barbituates)  1/3/2017 :: appropriate  5/24/2017 :: appropriate  11/17/17 :: appropriate  7/13/2018 :: appropriate  3/8/2019 :: (no results - test requisition not marked)  5/3/2019 :: (no results - test requisition not marked)  6/25/2020 :: negative (appropriate because out of medication that day)    >>Opioid Risk Tool:  1/3/2017 :: 6        Procedures:  Procedure: Trigger point injections  Muscle/s injected: Bilateral Upper Trapezius, Bilateral Middle Trapezius, Bilateral Levator Scapulae, Bilateral Rhomboids   Side: Bilateral     Description of Procedure:  Prior to starting this procedure, risks, benefits, complications, and alternatives were discussed with the patient. The patient agreed to proceed with the procedure. The procedural sites were identified, marked, and widely prepped with ChloraPrep.   A 27-gauge 1.5 inch needle was introduced into the above noted muscle/s. Following negative aspiration, a volume of 10mL solution comprised of 9 mL of 1% Lidocaine, and 1 mL of Depo-Medrol (40 mg/mL) was injected. No pain or paresthesia was noted on injection. This  process was repeated at multiple sites throughout the above noted muscle/s until the above noted solution was exhausted. The patient tolerated the procedure well. The injection sites were cleaned and bandaged as needed.      Complications: None

## 2020-08-28 NOTE — TELEPHONE ENCOUNTER
Pt was calling about a bill he received from [employer] let pt know that for the UDS they are billed separately can can reach out to [employer] and his insurance all questions answered//dp

## 2020-08-28 NOTE — TELEPHONE ENCOUNTER
----- Message from Liz Alexander sent at 8/28/2020  2:38 PM CDT -----  Regarding: meds  Contact: Patient  Patient has a question about his medication, please call him back at [phone]

## 2020-10-01 ENCOUNTER — PATIENT MESSAGE (OUTPATIENT)
Dept: OTHER | Facility: OTHER | Age: 65
End: 2020-10-01

## 2020-11-06 ENCOUNTER — OFFICE VISIT (OUTPATIENT)
Dept: PAIN MEDICINE | Facility: CLINIC | Age: 65
End: 2020-11-06
Payer: MEDICAID

## 2020-11-06 VITALS
WEIGHT: 190.06 LBS | HEIGHT: 71 IN | BODY MASS INDEX: 26.61 KG/M2 | HEART RATE: 87 BPM | SYSTOLIC BLOOD PRESSURE: 158 MMHG | DIASTOLIC BLOOD PRESSURE: 94 MMHG

## 2020-11-06 DIAGNOSIS — M54.2 NECK AND SHOULDER PAIN: ICD-10-CM

## 2020-11-06 DIAGNOSIS — M25.519 NECK AND SHOULDER PAIN: ICD-10-CM

## 2020-11-06 DIAGNOSIS — M50.30 DDD (DEGENERATIVE DISC DISEASE), CERVICAL: ICD-10-CM

## 2020-11-06 DIAGNOSIS — M47.812 CERVICAL FACET JOINT SYNDROME: Primary | ICD-10-CM

## 2020-11-06 DIAGNOSIS — M79.18 MYOFASCIAL MUSCLE PAIN: ICD-10-CM

## 2020-11-06 DIAGNOSIS — M50.30 DDD (DEGENERATIVE DISC DISEASE), CERVICAL: Primary | ICD-10-CM

## 2020-11-06 PROCEDURE — 99214 OFFICE O/P EST MOD 30 MIN: CPT | Mod: S$PBB,25,, | Performed by: PHYSICIAN ASSISTANT

## 2020-11-06 PROCEDURE — 20553 PR INJECT TRIGGER POINTS, > 3: ICD-10-PCS | Mod: S$PBB,,, | Performed by: PHYSICIAN ASSISTANT

## 2020-11-06 PROCEDURE — 99214 PR OFFICE/OUTPT VISIT, EST, LEVL IV, 30-39 MIN: ICD-10-PCS | Mod: S$PBB,25,, | Performed by: PHYSICIAN ASSISTANT

## 2020-11-06 PROCEDURE — 99999 PR PBB SHADOW E&M-EST. PATIENT-LVL IV: CPT | Mod: PBBFAC,,, | Performed by: PHYSICIAN ASSISTANT

## 2020-11-06 PROCEDURE — 20553 NJX 1/MLT TRIGGER POINTS 3/>: CPT | Mod: PBBFAC | Performed by: PHYSICIAN ASSISTANT

## 2020-11-06 PROCEDURE — 99214 OFFICE O/P EST MOD 30 MIN: CPT | Mod: PBBFAC,25 | Performed by: PHYSICIAN ASSISTANT

## 2020-11-06 PROCEDURE — 99999 PR PBB SHADOW E&M-EST. PATIENT-LVL IV: ICD-10-PCS | Mod: PBBFAC,,, | Performed by: PHYSICIAN ASSISTANT

## 2020-11-06 PROCEDURE — 20553 NJX 1/MLT TRIGGER POINTS 3/>: CPT | Mod: S$PBB,,, | Performed by: PHYSICIAN ASSISTANT

## 2020-11-06 RX ORDER — HYDROCODONE BITARTRATE AND ACETAMINOPHEN 7.5; 325 MG/1; MG/1
TABLET ORAL
Qty: 45 TABLET | Refills: 0 | Status: SHIPPED | OUTPATIENT
Start: 2020-11-06 | End: 2020-12-29 | Stop reason: SDUPTHER

## 2020-11-06 RX ORDER — LIDOCAINE HYDROCHLORIDE 10 MG/ML
1 INJECTION INFILTRATION; PERINEURAL ONCE
Qty: 1 ML | Refills: 0 | Status: SHIPPED | OUTPATIENT
Start: 2020-11-06 | End: 2020-11-06

## 2020-11-06 RX ORDER — METHYLPREDNISOLONE ACETATE 80 MG/ML
20 INJECTION, SUSPENSION INTRA-ARTICULAR; INTRALESIONAL; INTRAMUSCULAR; SOFT TISSUE
Status: COMPLETED | OUTPATIENT
Start: 2020-11-06 | End: 2020-11-06

## 2020-11-06 RX ORDER — METHYLPREDNISOLONE ACETATE 40 MG/ML
40 INJECTION, SUSPENSION INTRA-ARTICULAR; INTRALESIONAL; INTRAMUSCULAR; SOFT TISSUE
Status: DISCONTINUED | OUTPATIENT
Start: 2020-11-06 | End: 2020-11-06

## 2020-11-06 RX ADMIN — METHYLPREDNISOLONE ACETATE 20 MG: 80 INJECTION, SUSPENSION INTRALESIONAL; INTRAMUSCULAR; INTRASYNOVIAL; SOFT TISSUE at 08:11

## 2020-11-06 NOTE — PROGRESS NOTES
Chief Pain Complaint:  Neck Pain, Bilateral Arm Pain, Shoulder Pain       History of Present Illness:  This patient is a 65 y.o. male who presents today complaining of the above noted pain/s. The patient describes this pain as follows.    - duration of pain: pain for years   - timing: constant   - character: sharp, aching  - radiating, dermatomal: extends to the shoulders b/l, nondermatomal  - antecedent trauma, prior spinal surgery: no prior trauma, no prior spinal surgery, bilateral shoulder surgery  - pertinent negatives: No fever, No chills, No weight loss, No bladder dysfunction, No bowel dysfunction, No saddle anesthesia  - pertinent positives: generalized nonspecific Upper Extremity weakness bilaterally    - medications, other therapies tried (physical therapy, injections):     >> Norco, flexeril, gabapentin, Mobic, Topamax, Fioricet    >> Has previously undergone Physical Therapy - which he does not find helpful but does exercises at home    >> Has previously undergone spinal injection/s & trigger point injections          IMAGING / Labs / Studies (reviewed on 11/6/2020):    5-12-16 XR Lumbar and Thoracic:  Findings: There is slight levoscoliotic curvature of the lumbar spine.  There is grade 2 anterolisthesis of L5 on S1 with minimal grade 1 anterolisthesis of L4 on L5 and slight retrolisthesis of L3 on L4.  Moderate to severe disc height loss present at L5-S1 with mild disc height loss at L3-L4 and L4-L5. Bilateral facet arthropathy present at L4-L5 and L5-S1. No acute fractures visualized.  The remaining visualized osseous and soft tissue structures demonstrate no appreciable abnormality.  Findings: There is mild dextroscoliotic curvature of the thoracic spine.  There are multiple mild age-indeterminate compression deformities involving the mid and lower thoracic spine with suspected involvement of T6-T11.  There is mild multilevel disc height loss noted throughout the thoracic spine with associated disc  osteophyte complex production. Posterior elements appear grossly intact.   The remaining visualized osseous and soft tissue structures demonstrate no appreciable abnormality.       4/28/14 MRI Cervical Spine Without Contrast    Narrative Study:   Cervical spine MRI without contrast.04/28/14 11:09:00  Technique:  Multiplanar non contrast images obtained on the cervical spine.   History: Left neck pain.  Left shoulder pain.  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  No abnormal signal in the cord.  The craniocervical junction is normal.  C2-3: Facet hypertrophy on the left side with mild left sided neural foraminal narrowing  C3-4: Disk space narrowing.  Broad-based osteophyte disk foot.  Bilateral uncovertebral-joint disease with moderate severe neural frontal narrowing.  C4-5: Disk space narrowing.  Broad-based osteophyte disk foot.  Bilateral uncovertebral joint disease with mild to severe neural foraminal narrowing.  Decreased CSF spaces anterior-posterior to the cord.  C5-6: Broad-based osteophyte and disk bulge.  Bilateral uncovertebral joint disease with mild to moderate bilateral bony neural frontal narrowing greater on the left side.  Disk protrusion into the left lateral recess.  C6-7: Disk space narrowing.  Broad-based osteophyte and disk bulge bilateral uncovertebral joint disease with mild neural foraminal narrowing.  C7-T1: Mild to space narrowing.  Mild uncovertebral joint disease.  No significant neural frontal narrowing.           Review of Systems:  CONSTITUTIONAL: patient denies any fever, chills, or weight loss  SKIN: patient denies any rash or itching  RESPIRATORY: patient denies having any shortness of breath  GASTROINTESTINAL: patient denies having any diarrhea, constipation, or bowel incontinence  GENITOURINARY: patient denies having any abnormal bladder function    MUSCULOSKELETAL:  - patient complains of the above noted pain/s (see chief pain complaint)    NEUROLOGICAL:   - pain  "as above  - strength in Upper extremities is decreased, BILATERALLY  - sensation in Upper extremities is abnormal, BILATERALLY  - patient denies any loss of bowel or bladder control      PSYCHIATRIC: patient denies any change in mood           Physical Exam:  Vitals:    11/06/20 0719   BP: (!) 158/94   Pulse: 87   Weight: 86.2 kg (190 lb 0.6 oz)   Height: 5' 11" (1.803 m)   PainSc:   9    (reviewed on 11/6/2020)    General: alert and oriented, in no apparent distress.  Gait: normal gait.  Skin: no rashes, no discoloration, no obvious lesions  HEENT: normocephalic, atraumatic. Pupils equal and round.  Cardiovascular: no significant peripheral edema present.  Respiratory: without use of accessory muscles of respiration.    Musculoskeletal - Cervical Spine:  - Limited ROM secondary to pain reproduction   - Pain on flexion of cervical spine: Present   - Pain on extension of cervical spine: Present  - Cervical facet loading: Present  - TTP over the cervical facet joints: Present  - TTP over paracervical and trapezius muscles: Present bilaterally, tender to minimal palpation, worse on left   - Spurling's: Negative    Neuro - Upper Extremities:  - BUE Strength:R/L: D: 5/5; B: 5/5; T: 5/5; WF: 5/5; WE: 5/5; IO: 5/5  - Extremity Reflexes: Brisk and symmetric throughout  - Sensory: Sensation to light touch intact bilaterally      Psych:  Mood and affect is appropriate          Assessment:  John Laguna is a 65 y.o. year old male who is presenting with     Encounter Diagnoses   Name Primary?    Cervical facet joint syndrome Yes    Myofascial muscle pain     Neck and shoulder pain     DDD (degenerative disc disease), cervical        Plan:  1. Interventional: TPI of paracervical / trapezius muscles today.  Procedure note listed below.    2. Pharmacologic:   - Refill Norco 7.5/325 mg Po BID PRN (60 tabs) x 2 months. Will e-prescribe. Patient tolerating opioids with no side effects, obtaining good pain control with " functional improvement. Dr. Malik previously had an extensive discussion with patient regarding the side effects, misuse/addiction potential from these medication.    - Refill Zanaflex 4mg to take 1/2 to 1 tab BID PRN (60 tablets). Patient understands this may cause drowsiness.   - Continue Topamax 100mg QD.  - Opioid contract signed on 3/19/2018.     - [place]  reviewed and appropriate. Last filled 9-24-20.  - Will order UDS today to ensure medication compliance.     3. Rehabilitative: Encouraged regular exercise. Encourage home neck exercises.     4. Diagnostic: None for now.    5. Follow up:   - 8 week med refill with me  - Appointment with Dr. Cavanaugh to establish care/ sign pain contract if appropriate/ no medication refills required at that appointment      - I discussed the risks, benefits, and alternatives to potential treatment options. All questions and concerns were fully addressed today in clinic.             >>UDS:  8-11-16 :: appropriate (+hydrocodone metabolites, +barbituates)  1/3/2017 :: appropriate  5/24/2017 :: appropriate  11/17/17 :: appropriate  7/13/2018 :: appropriate  3/8/2019 :: (no results - test requisition not marked)  5/3/2019 :: (no results - test requisition not marked)  6/25/2020 :: negative (appropriate because out of medication that day)  11/6/2020 :: pending - last filled on 9/24/20     >>Opioid Risk Tool:  1/3/2017 :: 6        Procedures:  Procedure: Trigger point injections  Muscle/s injected: Bilateral Upper Trapezius, Bilateral Middle Trapezius, Bilateral Levator Scapulae, Bilateral Rhomboids   Side: Bilateral     Description of Procedure:  Prior to starting this procedure, risks, benefits, complications, and alternatives were discussed with the patient. The patient agreed to proceed with the procedure. The procedural sites were identified, marked, and widely prepped with ChloraPrep.   A 27-gauge 1.5 inch needle was introduced into the above noted muscle/s. Following negative  aspiration, a volume of 10mL solution comprised of 9 mL of 1% Lidocaine, and 1 mL of Depo-Medrol (40 mg/mL) was injected. No pain or paresthesia was noted on injection. This process was repeated at multiple sites throughout the above noted muscle/s until the above noted solution was exhausted. The patient tolerated the procedure well. The injection sites were cleaned and bandaged as needed.      Complications: None

## 2020-11-19 ENCOUNTER — TELEPHONE (OUTPATIENT)
Dept: PAIN MEDICINE | Facility: CLINIC | Age: 65
End: 2020-11-19

## 2020-11-19 NOTE — TELEPHONE ENCOUNTER
----- Message from Jeanne Paredes sent at 11/19/2020  2:24 PM CST -----  Contact: self/[phone]  Would like to consult with nurse regarding medication(HYDROcodone-acetaminophen (NORCO) 7.5-325 mg per tablet), patient would like to know he will get his refill.  Please call back at [phone]

## 2020-11-20 ENCOUNTER — TELEPHONE (OUTPATIENT)
Dept: PAIN MEDICINE | Facility: CLINIC | Age: 65
End: 2020-11-20

## 2020-11-20 DIAGNOSIS — M47.812 CERVICAL FACET JOINT SYNDROME: Primary | ICD-10-CM

## 2020-11-20 RX ORDER — HYDROCODONE BITARTRATE AND ACETAMINOPHEN 7.5; 325 MG/1; MG/1
TABLET ORAL
Qty: 45 TABLET | Refills: 0 | Status: SHIPPED | OUTPATIENT
Start: 2020-12-05 | End: 2020-12-24 | Stop reason: SDUPTHER

## 2020-11-20 NOTE — TELEPHONE ENCOUNTER
Spoke with MRN [id number] Mr Laguna he stated he was out of meds that he did not get the nov rx I then spoke with [id number] - [place], [place] - [address] AT [facility] who confirmed the rx was indeed picked up on 11/6/2020 he was in the passenger seat of the vehicle mr Laguna still states he did not get the rx the pharmacy board also shows pt filled the rx

## 2020-12-01 ENCOUNTER — TELEPHONE (OUTPATIENT)
Dept: PAIN MEDICINE | Facility: CLINIC | Age: 65
End: 2020-12-01

## 2020-12-01 NOTE — TELEPHONE ENCOUNTER
----- Message from Serina Carrillo sent at 12/1/2020  7:26 AM CST -----  Regarding: reschedule appt  Contact: pt  Pt needs to reschedule appt before next available in Feb with Gustavo Cavanaugh that the appt was  scheduled for. Pt insisted on message being sent to Lauren's office. Pt can be reached at [phone]

## 2020-12-01 NOTE — TELEPHONE ENCOUNTER
Contacted pt. Pt requesting reschedule appt with . Offered pt appt 2/3/21. Pt gladly accepted. All questions answered.//lp

## 2020-12-11 ENCOUNTER — PATIENT MESSAGE (OUTPATIENT)
Dept: OTHER | Facility: OTHER | Age: 65
End: 2020-12-11

## 2020-12-24 ENCOUNTER — OFFICE VISIT (OUTPATIENT)
Dept: PAIN MEDICINE | Facility: CLINIC | Age: 65
End: 2020-12-24
Payer: MEDICARE

## 2020-12-24 VITALS
HEART RATE: 82 BPM | WEIGHT: 232.25 LBS | BODY MASS INDEX: 32.52 KG/M2 | SYSTOLIC BLOOD PRESSURE: 167 MMHG | HEIGHT: 71 IN | DIASTOLIC BLOOD PRESSURE: 93 MMHG

## 2020-12-24 DIAGNOSIS — M47.812 CERVICAL FACET JOINT SYNDROME: ICD-10-CM

## 2020-12-24 DIAGNOSIS — M47.812 CERVICAL FACET JOINT SYNDROME: Primary | ICD-10-CM

## 2020-12-24 DIAGNOSIS — Z79.891 OPIOID USE AGREEMENT EXISTS: ICD-10-CM

## 2020-12-24 DIAGNOSIS — M79.18 MYOFASCIAL MUSCLE PAIN: ICD-10-CM

## 2020-12-24 DIAGNOSIS — M25.519 NECK AND SHOULDER PAIN: ICD-10-CM

## 2020-12-24 DIAGNOSIS — M54.2 NECK AND SHOULDER PAIN: ICD-10-CM

## 2020-12-24 DIAGNOSIS — M50.30 DDD (DEGENERATIVE DISC DISEASE), CERVICAL: ICD-10-CM

## 2020-12-24 PROCEDURE — 99213 OFFICE O/P EST LOW 20 MIN: CPT | Mod: PBBFAC | Performed by: PHYSICIAN ASSISTANT

## 2020-12-24 PROCEDURE — 20553 PR INJECT TRIGGER POINTS, > 3: ICD-10-PCS | Mod: S$PBB,,, | Performed by: PHYSICIAN ASSISTANT

## 2020-12-24 PROCEDURE — 99214 PR OFFICE/OUTPT VISIT, EST, LEVL IV, 30-39 MIN: ICD-10-PCS | Mod: 25,S$PBB,, | Performed by: PHYSICIAN ASSISTANT

## 2020-12-24 PROCEDURE — 99214 OFFICE O/P EST MOD 30 MIN: CPT | Mod: 25,S$PBB,, | Performed by: PHYSICIAN ASSISTANT

## 2020-12-24 PROCEDURE — 99999 PR PBB SHADOW E&M-EST. PATIENT-LVL III: ICD-10-PCS | Mod: PBBFAC,,, | Performed by: PHYSICIAN ASSISTANT

## 2020-12-24 PROCEDURE — 20553 NJX 1/MLT TRIGGER POINTS 3/>: CPT | Mod: PBBFAC | Performed by: PHYSICIAN ASSISTANT

## 2020-12-24 PROCEDURE — 99999 PR PBB SHADOW E&M-EST. PATIENT-LVL III: CPT | Mod: PBBFAC,,, | Performed by: PHYSICIAN ASSISTANT

## 2020-12-24 PROCEDURE — 20553 NJX 1/MLT TRIGGER POINTS 3/>: CPT | Mod: S$PBB,,, | Performed by: PHYSICIAN ASSISTANT

## 2020-12-24 RX ORDER — HYDROCODONE BITARTRATE AND ACETAMINOPHEN 7.5; 325 MG/1; MG/1
TABLET ORAL
Qty: 45 TABLET | Refills: 0 | Status: SHIPPED | OUTPATIENT
Start: 2021-01-04 | End: 2021-01-28 | Stop reason: SDUPTHER

## 2020-12-24 RX ORDER — METHYLPREDNISOLONE ACETATE 40 MG/ML
40 INJECTION, SUSPENSION INTRA-ARTICULAR; INTRALESIONAL; INTRAMUSCULAR; SOFT TISSUE
Status: COMPLETED | OUTPATIENT
Start: 2020-12-24 | End: 2020-12-24

## 2020-12-24 RX ORDER — TIZANIDINE 4 MG/1
TABLET ORAL
Qty: 60 TABLET | Refills: 0 | Status: SHIPPED | OUTPATIENT
Start: 2020-12-24 | End: 2020-12-29 | Stop reason: SDUPTHER

## 2020-12-24 RX ADMIN — METHYLPREDNISOLONE ACETATE 40 MG: 40 INJECTION, SUSPENSION INTRA-ARTICULAR; INTRALESIONAL; INTRAMUSCULAR; SOFT TISSUE at 07:12

## 2020-12-24 NOTE — PROGRESS NOTES
Chief Pain Complaint:  Neck Pain, Bilateral Arm Pain, Shoulder Pain       History of Present Illness:  This patient is a 65 y.o. male who presents today complaining of the above noted pain/s. The patient describes this pain as follows.    - duration of pain: pain for years   - timing: constant   - character: sharp, aching  - radiating, dermatomal: extends to the shoulders b/l, nondermatomal  - antecedent trauma, prior spinal surgery: no prior trauma, no prior spinal surgery, bilateral shoulder surgery  - pertinent negatives: No fever, No chills, No weight loss, No bladder dysfunction, No bowel dysfunction, No saddle anesthesia  - pertinent positives: generalized nonspecific Upper Extremity weakness bilaterally    - medications, other therapies tried (physical therapy, injections):     >> Norco, flexeril, gabapentin, Mobic, Topamax, Fioricet    >> Has previously undergone Physical Therapy - which he does not find helpful but does exercises at home    >> Has previously undergone spinal injection/s & trigger point injections          IMAGING / Labs / Studies (reviewed on 12/24/2020):    5-12-16 XR Lumbar and Thoracic:  Findings: There is slight levoscoliotic curvature of the lumbar spine.  There is grade 2 anterolisthesis of L5 on S1 with minimal grade 1 anterolisthesis of L4 on L5 and slight retrolisthesis of L3 on L4.  Moderate to severe disc height loss present at L5-S1 with mild disc height loss at L3-L4 and L4-L5. Bilateral facet arthropathy present at L4-L5 and L5-S1. No acute fractures visualized.  The remaining visualized osseous and soft tissue structures demonstrate no appreciable abnormality.  Findings: There is mild dextroscoliotic curvature of the thoracic spine.  There are multiple mild age-indeterminate compression deformities involving the mid and lower thoracic spine with suspected involvement of T6-T11.  There is mild multilevel disc height loss noted throughout the thoracic spine with associated disc  osteophyte complex production. Posterior elements appear grossly intact.   The remaining visualized osseous and soft tissue structures demonstrate no appreciable abnormality.       4/28/14 MRI Cervical Spine Without Contrast    Narrative Study:   Cervical spine MRI without contrast.04/28/14 11:09:00  Technique:  Multiplanar non contrast images obtained on the cervical spine.   History: Left neck pain.  Left shoulder pain.  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  No abnormal signal in the cord.  The craniocervical junction is normal.  C2-3: Facet hypertrophy on the left side with mild left sided neural foraminal narrowing  C3-4: Disk space narrowing.  Broad-based osteophyte disk foot.  Bilateral uncovertebral-joint disease with moderate severe neural frontal narrowing.  C4-5: Disk space narrowing.  Broad-based osteophyte disk foot.  Bilateral uncovertebral joint disease with mild to severe neural foraminal narrowing.  Decreased CSF spaces anterior-posterior to the cord.  C5-6: Broad-based osteophyte and disk bulge.  Bilateral uncovertebral joint disease with mild to moderate bilateral bony neural frontal narrowing greater on the left side.  Disk protrusion into the left lateral recess.  C6-7: Disk space narrowing.  Broad-based osteophyte and disk bulge bilateral uncovertebral joint disease with mild neural foraminal narrowing.  C7-T1: Mild to space narrowing.  Mild uncovertebral joint disease.  No significant neural frontal narrowing.           Review of Systems:  CONSTITUTIONAL: patient denies any fever, chills, or weight loss  SKIN: patient denies any rash or itching  RESPIRATORY: patient denies having any shortness of breath  GASTROINTESTINAL: patient denies having any diarrhea, constipation, or bowel incontinence  GENITOURINARY: patient denies having any abnormal bladder function    MUSCULOSKELETAL:  - patient complains of the above noted pain/s (see chief pain complaint)    NEUROLOGICAL:   - pain  "as above  - strength in Upper extremities is decreased, BILATERALLY  - sensation in Upper extremities is abnormal, BILATERALLY  - patient denies any loss of bowel or bladder control      PSYCHIATRIC: patient denies any change in mood           Physical Exam:  Vitals:    12/24/20 0715   BP: (!) 167/93   Pulse: 82   Weight: 105.3 kg (232 lb 4.1 oz)   Height: 5' 11" (1.803 m)   PainSc: 10-Worst pain ever    (reviewed on 12/24/2020)    General: alert and oriented, in no apparent distress.  Gait: normal gait.  Skin: no rashes, no discoloration, no obvious lesions  HEENT: normocephalic, atraumatic. Pupils equal and round.  Cardiovascular: no significant peripheral edema present.  Respiratory: without use of accessory muscles of respiration.    Musculoskeletal - Cervical Spine:  - Limited ROM secondary to pain reproduction   - Pain on flexion of cervical spine: Present   - Pain on extension of cervical spine: Present  - Cervical facet loading: Present  - TTP over the cervical facet joints: Present  - TTP over paracervical and trapezius muscles: Present bilaterally, tender to minimal palpation, worse on left   - Spurling's: Negative    Shoulder:  - decreased ROM due to neck pain     Neuro - Upper Extremities:  - BUE Strength:R/L: D: 5/5; B: 5/5; T: 5/5; WF: 5/5; WE: 5/5; IO: 5/5  - Extremity Reflexes: Brisk and symmetric throughout  - Sensory: Sensation to light touch intact bilaterally      Psych:  Mood and affect is appropriate          Assessment:  John Laguna is a 65 y.o. year old male who is presenting with     Encounter Diagnoses   Name Primary?    Cervical facet joint syndrome Yes    Myofascial muscle pain     Neck and shoulder pain     DDD (degenerative disc disease), cervical     Opioid use agreement exists        Plan:  1. Interventional: TPI of paracervical / trapezius muscles today.  Procedure note listed below.    2. Pharmacologic:   - Refill Norco 7.5/325 mg Po QD-BID PRN (45 tabs) for 30-day " supply x 1 month. [11.25 MME] Will e-prescribe. Patient tolerating opioids with no side effects, obtaining good pain control with functional improvement. Dr. Malik previously had an extensive discussion with patient regarding the side effects, misuse/addiction potential from these medication.    - Refill Zanaflex 4mg to take 1/2 to 1 tab BID PRN (60 tablets). Patient understands this may cause drowsiness.   - Continue Topamax 100mg QD.  - Opioid contract signed on 3/19/2018.     - [place]  reviewed and appropriate.       3. Rehabilitative: Encouraged regular exercise. Encourage home neck exercises.     4. Diagnostic: None for now.    5. Follow up: Appointment with PCP Dr. Rosenberg to opioid Refills     - I discussed the risks, benefits, and alternatives to potential treatment options. All questions and concerns were fully addressed today in clinic.             >>UDS:  8-11-16 :: appropriate (+hydrocodone metabolites, +barbituates)  1/3/2017 :: appropriate  5/24/2017 :: appropriate  11/17/17 :: appropriate  7/13/2018 :: appropriate  3/8/2019 :: (no results - test requisition not marked)  5/3/2019 :: (no results - test requisition not marked)  6/25/2020 :: negative (appropriate because out of medication that day)  11/6/2020 :: negative as expected - last filled on 9/24/20     >>Opioid Risk Tool:  1/3/2017 :: 6        Procedures:  Procedure: Trigger point injections  Muscle/s injected: Bilateral Upper Trapezius, Bilateral Middle Trapezius, Bilateral Levator Scapulae, Bilateral Rhomboids   Side: Bilateral     Description of Procedure:  Prior to starting this procedure, risks, benefits, complications, and alternatives were discussed with the patient. The patient agreed to proceed with the procedure. The procedural sites were identified, marked, and widely prepped with ChloraPrep.   A 27-gauge 1.5 inch needle was introduced into the above noted muscle/s. Following negative aspiration, a volume of 10mL solution comprised of 9  mL of 1% Lidocaine, and 1 mL of Depo-Medrol (40 mg/mL) was injected. No pain or paresthesia was noted on injection. This process was repeated at multiple sites throughout the above noted muscle/s until the above noted solution was exhausted. The patient tolerated the procedure well. The injection sites were cleaned and bandaged as needed.      Complications: None

## 2020-12-29 ENCOUNTER — OFFICE VISIT (OUTPATIENT)
Dept: FAMILY MEDICINE | Facility: CLINIC | Age: 65
End: 2020-12-29
Payer: MEDICARE

## 2020-12-29 VITALS
HEART RATE: 100 BPM | HEIGHT: 71 IN | OXYGEN SATURATION: 95 % | BODY MASS INDEX: 32.41 KG/M2 | DIASTOLIC BLOOD PRESSURE: 90 MMHG | SYSTOLIC BLOOD PRESSURE: 150 MMHG | WEIGHT: 231.5 LBS | TEMPERATURE: 98 F

## 2020-12-29 DIAGNOSIS — M47.812 ARTHROPATHY OF CERVICAL FACET JOINT: ICD-10-CM

## 2020-12-29 DIAGNOSIS — I10 HYPERTENSION NOT AT GOAL: Primary | ICD-10-CM

## 2020-12-29 DIAGNOSIS — M50.30 DDD (DEGENERATIVE DISC DISEASE), CERVICAL: ICD-10-CM

## 2020-12-29 DIAGNOSIS — Z12.11 COLON CANCER SCREENING: ICD-10-CM

## 2020-12-29 DIAGNOSIS — G43.119 INTRACTABLE MIGRAINE WITH AURA WITHOUT STATUS MIGRAINOSUS: ICD-10-CM

## 2020-12-29 DIAGNOSIS — N40.0 BENIGN PROSTATIC HYPERPLASIA WITHOUT LOWER URINARY TRACT SYMPTOMS: ICD-10-CM

## 2020-12-29 DIAGNOSIS — Z13.6 SCREENING FOR AAA (ABDOMINAL AORTIC ANEURYSM): ICD-10-CM

## 2020-12-29 PROCEDURE — 99213 OFFICE O/P EST LOW 20 MIN: CPT | Mod: PBBFAC,PO | Performed by: FAMILY MEDICINE

## 2020-12-29 PROCEDURE — 99999 PR PBB SHADOW E&M-EST. PATIENT-LVL III: ICD-10-PCS | Mod: PBBFAC,,, | Performed by: FAMILY MEDICINE

## 2020-12-29 PROCEDURE — 99214 OFFICE O/P EST MOD 30 MIN: CPT | Mod: S$PBB,,, | Performed by: FAMILY MEDICINE

## 2020-12-29 PROCEDURE — 99214 PR OFFICE/OUTPT VISIT, EST, LEVL IV, 30-39 MIN: ICD-10-PCS | Mod: S$PBB,,, | Performed by: FAMILY MEDICINE

## 2020-12-29 PROCEDURE — 99999 PR PBB SHADOW E&M-EST. PATIENT-LVL III: CPT | Mod: PBBFAC,,, | Performed by: FAMILY MEDICINE

## 2020-12-29 RX ORDER — HYDROCODONE BITARTRATE AND ACETAMINOPHEN 7.5; 325 MG/1; MG/1
TABLET ORAL
Qty: 45 TABLET | Refills: 0 | Status: SHIPPED | OUTPATIENT
Start: 2020-12-29 | End: 2021-02-03 | Stop reason: SDUPTHER

## 2020-12-29 RX ORDER — FLUTICASONE PROPIONATE 50 MCG
SPRAY, SUSPENSION (ML) NASAL
Qty: 16 ML | Refills: 4 | Status: SHIPPED | OUTPATIENT
Start: 2020-12-29 | End: 2021-03-21

## 2020-12-29 RX ORDER — TIZANIDINE 4 MG/1
TABLET ORAL
Qty: 60 TABLET | Refills: 0 | Status: SHIPPED | OUTPATIENT
Start: 2020-12-29 | End: 2021-02-23

## 2020-12-29 RX ORDER — OMEPRAZOLE 20 MG/1
20 CAPSULE, DELAYED RELEASE ORAL DAILY
Qty: 90 CAPSULE | Refills: 2 | Status: SHIPPED | OUTPATIENT
Start: 2020-12-29 | End: 2021-04-30

## 2020-12-29 RX ORDER — BUTALBITAL, ACETAMINOPHEN AND CAFFEINE 50; 325; 40 MG/1; MG/1; MG/1
1 TABLET ORAL EVERY 4 HOURS PRN
Qty: 30 TABLET | Refills: 1 | Status: SHIPPED | OUTPATIENT
Start: 2020-12-29 | End: 2021-04-30 | Stop reason: SDUPTHER

## 2020-12-29 RX ORDER — AMLODIPINE AND BENAZEPRIL HYDROCHLORIDE 10; 20 MG/1; MG/1
1 CAPSULE ORAL DAILY
Qty: 90 CAPSULE | Refills: 3 | Status: SHIPPED | OUTPATIENT
Start: 2020-12-29 | End: 2021-01-26 | Stop reason: SDUPTHER

## 2020-12-29 RX ORDER — TOPIRAMATE 100 MG/1
100 TABLET, FILM COATED ORAL DAILY
Qty: 30 TABLET | Refills: 4 | Status: SHIPPED | OUTPATIENT
Start: 2020-12-29 | End: 2021-03-21

## 2020-12-29 NOTE — PROGRESS NOTES
Chief Complaint:    Chief Complaint   Patient presents with    Annual Exam       History of Present Illness:  Is here for follow-up,  He says he has not been taking his medications blood pressure is running high today.  He has chronic neck pain goes to pain management since the pain management left he is here for his pain meds.      ROS:  Review of Systems   Constitutional: Negative for activity change, chills, fatigue, fever and unexpected weight change.   HENT: Negative for congestion, ear discharge, ear pain, hearing loss, postnasal drip and rhinorrhea.    Eyes: Negative for pain and visual disturbance.   Respiratory: Negative for cough, chest tightness and shortness of breath.    Cardiovascular: Negative for chest pain and palpitations.   Gastrointestinal: Negative for abdominal pain, diarrhea and vomiting.   Endocrine: Negative for heat intolerance.   Genitourinary: Negative for dysuria, flank pain, frequency and hematuria.   Musculoskeletal: Negative for back pain, gait problem and neck pain.   Skin: Negative for color change and rash.   Neurological: Negative for dizziness, tremors, seizures, numbness and headaches.   Psychiatric/Behavioral: Negative for agitation, hallucinations, self-injury, sleep disturbance and suicidal ideas. The patient is not nervous/anxious.        Past Medical History:   Diagnosis Date    Arthritis     Brain aneurysm     Cancer 2012    PROSTATE    COPD (chronic obstructive pulmonary disease)     Headache(784.0)     Hypertension     ON MEDS       Social History:  Social History     Socioeconomic History    Marital status: Single     Spouse name: Not on file    Number of children: Not on file    Years of education: Not on file    Highest education level: Not on file   Occupational History    Occupation: retired/disabled   Social Needs    Financial resource strain: Not on file    Food insecurity     Worry: Not on file     Inability: Not on file    Transportation needs  "    Medical: Not on file     Non-medical: Not on file   Tobacco Use    Smoking status: Current Every Day Smoker     Packs/day: 0.25     Years: 40.00     Pack years: 10.00     Types: Cigarettes    Smokeless tobacco: Never Used    Tobacco comment: instructed not to smoke after midnight   Substance and Sexual Activity    Alcohol use: No     Alcohol/week: 0.0 standard drinks    Drug use: No    Sexual activity: Yes     Birth control/protection: None   Lifestyle    Physical activity     Days per week: Not on file     Minutes per session: Not on file    Stress: Not on file   Relationships    Social connections     Talks on phone: Not on file     Gets together: Not on file     Attends Mandaen service: Not on file     Active member of club or organization: Not on file     Attends meetings of clubs or organizations: Not on file     Relationship status: Not on file   Other Topics Concern    Not on file   Social History Narrative    Not on file       Family History:   family history includes Aneurysm in his paternal uncle; Heart disease in his brother, father, mother, and sister.    Health Maintenance   Topic Date Due    Lipid Panel  05/24/2018    Pneumococcal Vaccine (65+ Low/Medium Risk) (2 of 2 - PPSV23) 05/06/2020    Abdominal Aortic Aneurysm Screening  05/06/2020    TETANUS VACCINE  09/05/2024    Hepatitis C Screening  Completed       Physical Exam:    Vital Signs  Temp: 98.1 °F (36.7 °C)  Temp src: Temporal  Pulse: 100  SpO2: 95 %  BP: (!) 150/90  BP Location: Left arm  Patient Position: Sitting  Pain Score:   9  Pain Loc: Neck  Height and Weight  Height: 5' 11" (180.3 cm)  Weight: 105 kg (231 lb 7.7 oz)  BSA (Calculated - sq m): 2.29 sq meters  BMI (Calculated): 32.3  Weight in (lb) to have BMI = 25: 178.9]    Body mass index is 32.29 kg/m².    Physical Exam  Constitutional:       Appearance: He is well-developed.   Eyes:      Conjunctiva/sclera: Conjunctivae normal.      Pupils: Pupils are equal, " round, and reactive to light.   Neck:      Musculoskeletal: Normal range of motion and neck supple.   Cardiovascular:      Rate and Rhythm: Normal rate and regular rhythm.      Heart sounds: Normal heart sounds. No murmur.   Pulmonary:      Effort: Pulmonary effort is normal. No respiratory distress.      Breath sounds: Normal breath sounds. No wheezing or rales.   Chest:      Chest wall: No tenderness.   Abdominal:      General: There is no distension.      Palpations: Abdomen is soft. There is no mass.      Tenderness: There is no abdominal tenderness. There is no guarding.   Musculoskeletal:         General: No tenderness.   Lymphadenopathy:      Cervical: No cervical adenopathy.   Skin:     General: Skin is warm and dry.   Neurological:      Mental Status: He is alert and oriented to person, place, and time.      Deep Tendon Reflexes: Reflexes are normal and symmetric.   Psychiatric:         Behavior: Behavior normal.         Thought Content: Thought content normal.         Judgment: Judgment normal.         Lab Results   Component Value Date    CHOL 139 05/24/2017    CHOL 177 05/03/2016    CHOL 160 10/02/2015    TRIG 143 05/24/2017    TRIG 106 05/03/2016    TRIG 88 10/02/2015    HDL 45 05/24/2017    HDL 65 05/03/2016    HDL 49 10/02/2015    TOTALCHOLEST 3.1 05/24/2017    TOTALCHOLEST 2.7 05/03/2016    TOTALCHOLEST 3.3 10/02/2015    NONHDLCHOL 94 05/24/2017    NONHDLCHOL 112 05/03/2016    NONHDLCHOL 111 10/02/2015       Lab Results   Component Value Date    HGBA1C 4.8 03/21/2018       Assessment:      ICD-10-CM ICD-9-CM   1. Hypertension not at goal  I10 401.9   2. Intractable migraine with aura without status migrainosus  G43.119 346.01   3. Arthropathy of cervical facet joint  M47.812 721.0   4. Colon cancer screening  Z12.11 V76.51   5. Screening for AAA (abdominal aortic aneurysm)  Z13.6 V81.2   6. DDD (degenerative disc disease), cervical  M50.30 722.4   7. Benign prostatic hyperplasia without lower urinary  tract symptoms  N40.0 600.00         Plan:     check patient's Elliott number 45 refill today  Resume blood pressure medication please start monitoring blood pressure carefully  Migraine headache stable  BPH stable check PSA today  Schedule a AAA screen  Do recommend screening colonoscopy  Follow-up 1 month    Orders Placed This Encounter   Procedures    US Abdominal Aorta    CBC Auto Differential    Comprehensive Metabolic Panel    Lipid Panel    Hemoglobin A1C    PSA, Screening       Current Outpatient Medications   Medication Sig Dispense Refill    acetaminophen (TYLENOL) 650 MG TbSR Take 650 mg by mouth every 8 (eight) hours.      butalbital-acetaminophen-caffeine -40 mg (FIORICET, ESGIC) -40 mg per tablet Take 1 tablet by mouth every 4 (four) hours as needed. for pain. 30 tablet 1    fluticasone propionate (FLONASE) 50 mcg/actuation nasal spray SHAKE LIQUID WELL AND USE 2 SPRAYS IN EACH NOSTRIL EVERY DAY 16 mL 4    [START ON 1/4/2021] HYDROcodone-acetaminophen (NORCO) 7.5-325 mg per tablet Take 1 tab PO every 8 to 12 hours PRN Pain. 30 day supply 45 tablet 0    HYDROcodone-acetaminophen (NORCO) 7.5-325 mg per tablet Take 1 tab PO every 8 to 12 hours PRN Pain. 30 day supply 45 tablet 0    omeprazole (PRILOSEC) 20 MG capsule Take 1 capsule (20 mg total) by mouth once daily. 90 capsule 2    tiZANidine (ZANAFLEX) 4 MG tablet TAKE 1/2 TO 1 TABLET BY MOUTH TWICE DAILY AS NEEDED FOR MUSCLE SPASMS. MAY CAUSE DROWSINESS 60 tablet 0    topiramate (TOPAMAX) 100 MG tablet Take 1 tablet (100 mg total) by mouth once daily. 30 tablet 4    amlodipine-benazepril 10-20mg (LOTREL) 10-20 mg per capsule Take 1 capsule by mouth once daily. 90 capsule 3     No current facility-administered medications for this visit.        Medications Discontinued During This Encounter   Medication Reason    amlodipine-benazepril 5-20 mg (LOTREL) 5-20 mg per capsule Patient no longer taking    atorvastatin (LIPITOR) 40  MG tablet Patient no longer taking    PROVENTIL HFA 90 mcg/actuation inhaler Patient no longer taking    butalbital-acetaminophen-caffeine -40 mg (FIORICET, ESGIC) -40 mg per tablet Reorder    topiramate (TOPAMAX) 100 MG tablet Reorder    omeprazole (PRILOSEC) 20 MG capsule Reorder    fluticasone (FLONASE) 50 mcg/actuation nasal spray Reorder    HYDROcodone-acetaminophen (NORCO) 7.5-325 mg per tablet Reorder    tiZANidine (ZANAFLEX) 4 MG tablet Reorder       Follow up in about 1 month (around 1/29/2021).      Tori Rosenberg MD

## 2020-12-30 ENCOUNTER — LAB VISIT (OUTPATIENT)
Dept: LAB | Facility: HOSPITAL | Age: 65
End: 2020-12-30
Attending: FAMILY MEDICINE
Payer: MEDICARE

## 2020-12-30 DIAGNOSIS — N40.0 BENIGN PROSTATIC HYPERPLASIA WITHOUT LOWER URINARY TRACT SYMPTOMS: ICD-10-CM

## 2020-12-30 DIAGNOSIS — I10 HYPERTENSION NOT AT GOAL: ICD-10-CM

## 2020-12-30 LAB
ALBUMIN SERPL BCP-MCNC: 4 G/DL (ref 3.5–5.2)
ALP SERPL-CCNC: 136 U/L (ref 55–135)
ALT SERPL W/O P-5'-P-CCNC: 16 U/L (ref 10–44)
ANION GAP SERPL CALC-SCNC: 8 MMOL/L (ref 8–16)
AST SERPL-CCNC: 20 U/L (ref 10–40)
BASOPHILS # BLD AUTO: 0.12 K/UL (ref 0–0.2)
BASOPHILS NFR BLD: 1.7 % (ref 0–1.9)
BILIRUB SERPL-MCNC: 0.9 MG/DL (ref 0.1–1)
BUN SERPL-MCNC: 11 MG/DL (ref 8–23)
CALCIUM SERPL-MCNC: 9.4 MG/DL (ref 8.7–10.5)
CHLORIDE SERPL-SCNC: 100 MMOL/L (ref 95–110)
CHOLEST SERPL-MCNC: 224 MG/DL (ref 120–199)
CHOLEST/HDLC SERPL: 3 {RATIO} (ref 2–5)
CO2 SERPL-SCNC: 29 MMOL/L (ref 23–29)
COMPLEXED PSA SERPL-MCNC: 6.5 NG/ML (ref 0–4)
CREAT SERPL-MCNC: 0.9 MG/DL (ref 0.5–1.4)
DIFFERENTIAL METHOD: ABNORMAL
EOSINOPHIL # BLD AUTO: 0.1 K/UL (ref 0–0.5)
EOSINOPHIL NFR BLD: 2 % (ref 0–8)
ERYTHROCYTE [DISTWIDTH] IN BLOOD BY AUTOMATED COUNT: 13 % (ref 11.5–14.5)
EST. GFR  (AFRICAN AMERICAN): >60 ML/MIN/1.73 M^2
EST. GFR  (NON AFRICAN AMERICAN): >60 ML/MIN/1.73 M^2
GLUCOSE SERPL-MCNC: 97 MG/DL (ref 70–110)
HCT VFR BLD AUTO: 55.1 % (ref 40–54)
HDLC SERPL-MCNC: 75 MG/DL (ref 40–75)
HDLC SERPL: 33.5 % (ref 20–50)
HGB BLD-MCNC: 17.9 G/DL (ref 14–18)
IMM GRANULOCYTES # BLD AUTO: 0.08 K/UL (ref 0–0.04)
IMM GRANULOCYTES NFR BLD AUTO: 1.2 % (ref 0–0.5)
LDLC SERPL CALC-MCNC: 134 MG/DL (ref 63–159)
LYMPHOCYTES # BLD AUTO: 1.7 K/UL (ref 1–4.8)
LYMPHOCYTES NFR BLD: 24 % (ref 18–48)
MCH RBC QN AUTO: 32 PG (ref 27–31)
MCHC RBC AUTO-ENTMCNC: 32.5 G/DL (ref 32–36)
MCV RBC AUTO: 99 FL (ref 82–98)
MONOCYTES # BLD AUTO: 0.7 K/UL (ref 0.3–1)
MONOCYTES NFR BLD: 9.6 % (ref 4–15)
NEUTROPHILS # BLD AUTO: 4.2 K/UL (ref 1.8–7.7)
NEUTROPHILS NFR BLD: 61.5 % (ref 38–73)
NONHDLC SERPL-MCNC: 149 MG/DL
NRBC BLD-RTO: 0 /100 WBC
PLATELET # BLD AUTO: 296 K/UL (ref 150–350)
PMV BLD AUTO: 10 FL (ref 9.2–12.9)
POTASSIUM SERPL-SCNC: 4.2 MMOL/L (ref 3.5–5.1)
PROT SERPL-MCNC: 8 G/DL (ref 6–8.4)
RBC # BLD AUTO: 5.59 M/UL (ref 4.6–6.2)
SODIUM SERPL-SCNC: 137 MMOL/L (ref 136–145)
TRIGL SERPL-MCNC: 75 MG/DL (ref 30–150)
WBC # BLD AUTO: 6.87 K/UL (ref 3.9–12.7)

## 2020-12-30 PROCEDURE — 36415 COLL VENOUS BLD VENIPUNCTURE: CPT | Mod: PO

## 2020-12-30 PROCEDURE — 80053 COMPREHEN METABOLIC PANEL: CPT

## 2020-12-30 PROCEDURE — 83036 HEMOGLOBIN GLYCOSYLATED A1C: CPT | Mod: DBM

## 2020-12-30 PROCEDURE — 85025 COMPLETE CBC W/AUTO DIFF WBC: CPT

## 2020-12-30 PROCEDURE — 80061 LIPID PANEL: CPT

## 2020-12-30 PROCEDURE — 84153 ASSAY OF PSA TOTAL: CPT | Mod: DBM

## 2020-12-31 LAB
ESTIMATED AVG GLUCOSE: 105 MG/DL (ref 68–131)
HBA1C MFR BLD HPLC: 5.3 % (ref 4–5.6)

## 2021-01-06 ENCOUNTER — TELEPHONE (OUTPATIENT)
Dept: FAMILY MEDICINE | Facility: CLINIC | Age: 66
End: 2021-01-06

## 2021-01-11 ENCOUNTER — TELEPHONE (OUTPATIENT)
Dept: RADIOLOGY | Facility: HOSPITAL | Age: 66
End: 2021-01-11

## 2021-01-26 DIAGNOSIS — M47.812 CERVICAL FACET JOINT SYNDROME: ICD-10-CM

## 2021-01-26 RX ORDER — AMLODIPINE AND BENAZEPRIL HYDROCHLORIDE 10; 20 MG/1; MG/1
1 CAPSULE ORAL DAILY
Qty: 90 CAPSULE | Refills: 3 | Status: SHIPPED | OUTPATIENT
Start: 2021-01-26 | End: 2023-11-17 | Stop reason: SDUPTHER

## 2021-01-26 RX ORDER — HYDROCODONE BITARTRATE AND ACETAMINOPHEN 7.5; 325 MG/1; MG/1
TABLET ORAL
Qty: 45 TABLET | Refills: 0 | OUTPATIENT
Start: 2021-01-26

## 2021-01-27 ENCOUNTER — TELEPHONE (OUTPATIENT)
Dept: RADIOLOGY | Facility: HOSPITAL | Age: 66
End: 2021-01-27

## 2021-01-28 ENCOUNTER — OFFICE VISIT (OUTPATIENT)
Dept: FAMILY MEDICINE | Facility: CLINIC | Age: 66
End: 2021-01-28
Payer: MEDICARE

## 2021-01-28 ENCOUNTER — HOSPITAL ENCOUNTER (OUTPATIENT)
Dept: RADIOLOGY | Facility: HOSPITAL | Age: 66
Discharge: HOME | End: 2021-01-28
Attending: FAMILY MEDICINE
Payer: MEDICARE

## 2021-01-28 ENCOUNTER — TELEPHONE (OUTPATIENT)
Dept: FAMILY MEDICINE | Facility: CLINIC | Age: 66
End: 2021-01-28

## 2021-01-28 VITALS
OXYGEN SATURATION: 96 % | HEIGHT: 71 IN | DIASTOLIC BLOOD PRESSURE: 80 MMHG | SYSTOLIC BLOOD PRESSURE: 138 MMHG | WEIGHT: 230.38 LBS | TEMPERATURE: 99 F | HEART RATE: 91 BPM | BODY MASS INDEX: 32.25 KG/M2

## 2021-01-28 DIAGNOSIS — Z13.6 SCREENING FOR AAA (ABDOMINAL AORTIC ANEURYSM): ICD-10-CM

## 2021-01-28 DIAGNOSIS — E78.2 MIXED HYPERLIPIDEMIA: ICD-10-CM

## 2021-01-28 DIAGNOSIS — M47.812 CERVICAL FACET JOINT SYNDROME: ICD-10-CM

## 2021-01-28 DIAGNOSIS — I10 ESSENTIAL HYPERTENSION: ICD-10-CM

## 2021-01-28 DIAGNOSIS — M47.892 OTHER OSTEOARTHRITIS OF SPINE, CERVICAL REGION: ICD-10-CM

## 2021-01-28 DIAGNOSIS — D58.2 ELEVATED HEMOGLOBIN: Primary | ICD-10-CM

## 2021-01-28 DIAGNOSIS — Z12.11 COLON CANCER SCREENING: ICD-10-CM

## 2021-01-28 DIAGNOSIS — R97.20 ELEVATED PSA: ICD-10-CM

## 2021-01-28 PROCEDURE — 76775 US EXAM ABDO BACK WALL LIM: CPT | Mod: 26,,, | Performed by: RADIOLOGY

## 2021-01-28 PROCEDURE — 76775 US EXAM ABDO BACK WALL LIM: CPT | Mod: TC

## 2021-01-28 PROCEDURE — 99214 OFFICE O/P EST MOD 30 MIN: CPT | Mod: PBBFAC,25,PO | Performed by: FAMILY MEDICINE

## 2021-01-28 PROCEDURE — 99999 PR PBB SHADOW E&M-EST. PATIENT-LVL IV: ICD-10-PCS | Mod: PBBFAC,,, | Performed by: FAMILY MEDICINE

## 2021-01-28 PROCEDURE — 76775 US ABDOMINAL AORTA: ICD-10-PCS | Mod: 26,,, | Performed by: RADIOLOGY

## 2021-01-28 PROCEDURE — 99214 OFFICE O/P EST MOD 30 MIN: CPT | Mod: S$PBB,,, | Performed by: FAMILY MEDICINE

## 2021-01-28 PROCEDURE — 99999 PR PBB SHADOW E&M-EST. PATIENT-LVL IV: CPT | Mod: PBBFAC,,, | Performed by: FAMILY MEDICINE

## 2021-01-28 PROCEDURE — 99214 PR OFFICE/OUTPT VISIT, EST, LEVL IV, 30-39 MIN: ICD-10-PCS | Mod: S$PBB,,, | Performed by: FAMILY MEDICINE

## 2021-01-28 RX ORDER — ROSUVASTATIN CALCIUM 20 MG/1
20 TABLET, COATED ORAL DAILY
Qty: 90 TABLET | Refills: 3 | Status: SHIPPED | OUTPATIENT
Start: 2021-01-28 | End: 2022-07-20

## 2021-01-28 RX ORDER — HYDROCODONE BITARTRATE AND ACETAMINOPHEN 7.5; 325 MG/1; MG/1
TABLET ORAL
Qty: 45 TABLET | Refills: 0 | Status: SHIPPED | OUTPATIENT
Start: 2021-01-28 | End: 2021-04-30 | Stop reason: SDUPTHER

## 2021-01-28 RX ORDER — ALBUTEROL SULFATE 90 UG/1
2 AEROSOL, METERED RESPIRATORY (INHALATION)
Qty: 18 G | Refills: 2 | Status: SHIPPED | OUTPATIENT
Start: 2021-01-28 | End: 2022-02-21 | Stop reason: SDUPTHER

## 2021-02-03 ENCOUNTER — PATIENT OUTREACH (OUTPATIENT)
Dept: ADMINISTRATIVE | Facility: OTHER | Age: 66
End: 2021-02-03

## 2021-02-03 ENCOUNTER — OFFICE VISIT (OUTPATIENT)
Dept: PAIN MEDICINE | Facility: CLINIC | Age: 66
End: 2021-02-03
Payer: MEDICARE

## 2021-02-03 VITALS
WEIGHT: 229.38 LBS | HEART RATE: 86 BPM | DIASTOLIC BLOOD PRESSURE: 80 MMHG | BODY MASS INDEX: 32.11 KG/M2 | SYSTOLIC BLOOD PRESSURE: 148 MMHG | HEIGHT: 71 IN

## 2021-02-03 DIAGNOSIS — Z79.891 OPIOID USE AGREEMENT EXISTS: ICD-10-CM

## 2021-02-03 DIAGNOSIS — M50.30 DDD (DEGENERATIVE DISC DISEASE), CERVICAL: ICD-10-CM

## 2021-02-03 DIAGNOSIS — M25.519 NECK AND SHOULDER PAIN: ICD-10-CM

## 2021-02-03 DIAGNOSIS — M79.18 MYOFASCIAL MUSCLE PAIN: Primary | ICD-10-CM

## 2021-02-03 DIAGNOSIS — M54.2 NECK AND SHOULDER PAIN: ICD-10-CM

## 2021-02-03 PROCEDURE — 99999 PR PBB SHADOW E&M-EST. PATIENT-LVL III: ICD-10-PCS | Mod: PBBFAC,,, | Performed by: PHYSICIAN ASSISTANT

## 2021-02-03 PROCEDURE — 99999 PR PBB SHADOW E&M-EST. PATIENT-LVL III: CPT | Mod: PBBFAC,,, | Performed by: PHYSICIAN ASSISTANT

## 2021-02-03 PROCEDURE — 20553 PR INJECT TRIGGER POINTS, > 3: ICD-10-PCS | Mod: S$PBB,,, | Performed by: PHYSICIAN ASSISTANT

## 2021-02-03 PROCEDURE — 20553 NJX 1/MLT TRIGGER POINTS 3/>: CPT | Mod: S$PBB,,, | Performed by: PHYSICIAN ASSISTANT

## 2021-02-03 PROCEDURE — 99214 PR OFFICE/OUTPT VISIT, EST, LEVL IV, 30-39 MIN: ICD-10-PCS | Mod: S$PBB,25,, | Performed by: PHYSICIAN ASSISTANT

## 2021-02-03 PROCEDURE — 99214 OFFICE O/P EST MOD 30 MIN: CPT | Mod: S$PBB,25,, | Performed by: PHYSICIAN ASSISTANT

## 2021-02-03 PROCEDURE — 99213 OFFICE O/P EST LOW 20 MIN: CPT | Mod: PBBFAC | Performed by: PHYSICIAN ASSISTANT

## 2021-02-03 RX ORDER — METHYLPREDNISOLONE ACETATE 40 MG/ML
40 INJECTION, SUSPENSION INTRA-ARTICULAR; INTRALESIONAL; INTRAMUSCULAR; SOFT TISSUE
Status: COMPLETED | OUTPATIENT
Start: 2021-02-03 | End: 2021-02-03

## 2021-02-03 RX ORDER — HYDROCODONE BITARTRATE AND ACETAMINOPHEN 7.5; 325 MG/1; MG/1
TABLET ORAL
Qty: 45 TABLET | Refills: 0 | Status: SHIPPED | OUTPATIENT
Start: 2021-02-26 | End: 2021-04-30 | Stop reason: SDUPTHER

## 2021-02-03 RX ORDER — HYDROCODONE BITARTRATE AND ACETAMINOPHEN 7.5; 325 MG/1; MG/1
TABLET ORAL
Qty: 45 TABLET | Refills: 0 | Status: SHIPPED | OUTPATIENT
Start: 2021-03-28 | End: 2021-06-29 | Stop reason: SDUPTHER

## 2021-02-03 RX ADMIN — METHYLPREDNISOLONE ACETATE 40 MG: 40 INJECTION, SUSPENSION INTRA-ARTICULAR; INTRALESIONAL; INTRAMUSCULAR; SOFT TISSUE at 09:02

## 2021-04-07 ENCOUNTER — TELEPHONE (OUTPATIENT)
Dept: ENDOSCOPY | Facility: HOSPITAL | Age: 66
End: 2021-04-07

## 2021-04-28 ENCOUNTER — PATIENT OUTREACH (OUTPATIENT)
Dept: ADMINISTRATIVE | Facility: OTHER | Age: 66
End: 2021-04-28

## 2021-04-30 ENCOUNTER — OFFICE VISIT (OUTPATIENT)
Dept: PAIN MEDICINE | Facility: CLINIC | Age: 66
End: 2021-04-30
Payer: MEDICARE

## 2021-04-30 VITALS
DIASTOLIC BLOOD PRESSURE: 30 MMHG | HEIGHT: 71 IN | SYSTOLIC BLOOD PRESSURE: 128 MMHG | WEIGHT: 223.75 LBS | BODY MASS INDEX: 31.32 KG/M2 | HEART RATE: 93 BPM

## 2021-04-30 DIAGNOSIS — M54.2 NECK AND SHOULDER PAIN: ICD-10-CM

## 2021-04-30 DIAGNOSIS — M50.30 DDD (DEGENERATIVE DISC DISEASE), CERVICAL: ICD-10-CM

## 2021-04-30 DIAGNOSIS — M25.519 NECK AND SHOULDER PAIN: ICD-10-CM

## 2021-04-30 DIAGNOSIS — Z79.891 OPIOID USE AGREEMENT EXISTS: ICD-10-CM

## 2021-04-30 DIAGNOSIS — M79.18 MYOFASCIAL MUSCLE PAIN: Primary | ICD-10-CM

## 2021-04-30 DIAGNOSIS — M47.812 CERVICAL FACET JOINT SYNDROME: ICD-10-CM

## 2021-04-30 PROCEDURE — 99214 OFFICE O/P EST MOD 30 MIN: CPT | Mod: 25,S$PBB,, | Performed by: PHYSICIAN ASSISTANT

## 2021-04-30 PROCEDURE — 99214 PR OFFICE/OUTPT VISIT, EST, LEVL IV, 30-39 MIN: ICD-10-PCS | Mod: 25,S$PBB,, | Performed by: PHYSICIAN ASSISTANT

## 2021-04-30 PROCEDURE — 99213 OFFICE O/P EST LOW 20 MIN: CPT | Mod: PBBFAC,25 | Performed by: PHYSICIAN ASSISTANT

## 2021-04-30 PROCEDURE — 99999 PR PBB SHADOW E&M-EST. PATIENT-LVL III: ICD-10-PCS | Mod: PBBFAC,,, | Performed by: PHYSICIAN ASSISTANT

## 2021-04-30 PROCEDURE — 20553 PR INJECT TRIGGER POINTS, > 3: ICD-10-PCS | Mod: S$PBB,,, | Performed by: PHYSICIAN ASSISTANT

## 2021-04-30 PROCEDURE — 20553 NJX 1/MLT TRIGGER POINTS 3/>: CPT | Mod: PBBFAC | Performed by: PHYSICIAN ASSISTANT

## 2021-04-30 PROCEDURE — 99999 PR PBB SHADOW E&M-EST. PATIENT-LVL III: CPT | Mod: PBBFAC,,, | Performed by: PHYSICIAN ASSISTANT

## 2021-04-30 PROCEDURE — 20553 NJX 1/MLT TRIGGER POINTS 3/>: CPT | Mod: S$PBB,,, | Performed by: PHYSICIAN ASSISTANT

## 2021-04-30 RX ORDER — TIZANIDINE 4 MG/1
2-4 TABLET ORAL 2 TIMES DAILY PRN
Qty: 60 TABLET | Refills: 0 | Status: SHIPPED | OUTPATIENT
Start: 2021-04-30 | End: 2021-09-13 | Stop reason: SDUPTHER

## 2021-04-30 RX ORDER — BUTALBITAL, ASPIRIN AND CAFFEINE 50; 325; 40 MG/1; MG/1; MG/1
TABLET ORAL EVERY 4 HOURS PRN
COMMUNITY
End: 2023-11-17

## 2021-04-30 RX ORDER — HYDROCODONE BITARTRATE AND ACETAMINOPHEN 7.5; 325 MG/1; MG/1
TABLET ORAL
Qty: 45 TABLET | Refills: 0 | Status: CANCELLED | OUTPATIENT
Start: 2021-05-30

## 2021-04-30 RX ORDER — HYDROCODONE BITARTRATE AND ACETAMINOPHEN 7.5; 325 MG/1; MG/1
TABLET ORAL
Qty: 45 TABLET | Refills: 0 | Status: SHIPPED | OUTPATIENT
Start: 2021-04-30 | End: 2021-06-29 | Stop reason: SDUPTHER

## 2021-04-30 RX ORDER — HYDROCODONE BITARTRATE AND ACETAMINOPHEN 7.5; 325 MG/1; MG/1
TABLET ORAL
Qty: 45 TABLET | Refills: 0 | Status: SHIPPED | OUTPATIENT
Start: 2021-05-30 | End: 2021-09-03 | Stop reason: SDUPTHER

## 2021-04-30 RX ORDER — ALBUTEROL SULFATE 90 UG/1
2 AEROSOL, METERED RESPIRATORY (INHALATION) EVERY 6 HOURS PRN
COMMUNITY

## 2021-04-30 RX ORDER — METHYLPREDNISOLONE ACETATE 40 MG/ML
40 INJECTION, SUSPENSION INTRA-ARTICULAR; INTRALESIONAL; INTRAMUSCULAR; SOFT TISSUE
Status: COMPLETED | OUTPATIENT
Start: 2021-04-30 | End: 2021-04-30

## 2021-04-30 RX ADMIN — METHYLPREDNISOLONE ACETATE 40 MG: 40 INJECTION, SUSPENSION INTRA-ARTICULAR; INTRALESIONAL; INTRAMUSCULAR; SOFT TISSUE at 08:04

## 2021-05-31 ENCOUNTER — PATIENT MESSAGE (OUTPATIENT)
Dept: ENDOSCOPY | Facility: HOSPITAL | Age: 66
End: 2021-05-31

## 2021-06-23 ENCOUNTER — PATIENT OUTREACH (OUTPATIENT)
Dept: ADMINISTRATIVE | Facility: OTHER | Age: 66
End: 2021-06-23

## 2021-06-25 ENCOUNTER — TELEPHONE (OUTPATIENT)
Dept: PAIN MEDICINE | Facility: CLINIC | Age: 66
End: 2021-06-25

## 2021-06-29 ENCOUNTER — OFFICE VISIT (OUTPATIENT)
Dept: PAIN MEDICINE | Facility: CLINIC | Age: 66
End: 2021-06-29
Payer: MEDICARE

## 2021-06-29 VITALS
SYSTOLIC BLOOD PRESSURE: 159 MMHG | DIASTOLIC BLOOD PRESSURE: 97 MMHG | HEART RATE: 109 BPM | WEIGHT: 221 LBS | HEIGHT: 71 IN | BODY MASS INDEX: 30.94 KG/M2

## 2021-06-29 DIAGNOSIS — M25.519 NECK AND SHOULDER PAIN: ICD-10-CM

## 2021-06-29 DIAGNOSIS — Z79.891 OPIOID USE AGREEMENT EXISTS: ICD-10-CM

## 2021-06-29 DIAGNOSIS — M54.2 NECK AND SHOULDER PAIN: ICD-10-CM

## 2021-06-29 DIAGNOSIS — M50.30 DDD (DEGENERATIVE DISC DISEASE), CERVICAL: ICD-10-CM

## 2021-06-29 DIAGNOSIS — M47.812 CERVICAL FACET JOINT SYNDROME: ICD-10-CM

## 2021-06-29 DIAGNOSIS — M79.18 MYOFASCIAL MUSCLE PAIN: Primary | ICD-10-CM

## 2021-06-29 PROCEDURE — 20553 PR INJECT TRIGGER POINTS, > 3: ICD-10-PCS | Mod: S$PBB,,, | Performed by: PHYSICIAN ASSISTANT

## 2021-06-29 PROCEDURE — 99214 OFFICE O/P EST MOD 30 MIN: CPT | Mod: PBBFAC,25 | Performed by: PHYSICIAN ASSISTANT

## 2021-06-29 PROCEDURE — 99214 OFFICE O/P EST MOD 30 MIN: CPT | Mod: 25,S$PBB,, | Performed by: PHYSICIAN ASSISTANT

## 2021-06-29 PROCEDURE — 20553 NJX 1/MLT TRIGGER POINTS 3/>: CPT | Mod: PBBFAC | Performed by: PHYSICIAN ASSISTANT

## 2021-06-29 PROCEDURE — 99999 PR PBB SHADOW E&M-EST. PATIENT-LVL IV: CPT | Mod: PBBFAC,,, | Performed by: PHYSICIAN ASSISTANT

## 2021-06-29 PROCEDURE — 99999 PR PBB SHADOW E&M-EST. PATIENT-LVL IV: ICD-10-PCS | Mod: PBBFAC,,, | Performed by: PHYSICIAN ASSISTANT

## 2021-06-29 PROCEDURE — 99214 PR OFFICE/OUTPT VISIT, EST, LEVL IV, 30-39 MIN: ICD-10-PCS | Mod: 25,S$PBB,, | Performed by: PHYSICIAN ASSISTANT

## 2021-06-29 PROCEDURE — 20553 NJX 1/MLT TRIGGER POINTS 3/>: CPT | Mod: S$PBB,,, | Performed by: PHYSICIAN ASSISTANT

## 2021-06-29 RX ORDER — HYDROCODONE BITARTRATE AND ACETAMINOPHEN 7.5; 325 MG/1; MG/1
TABLET ORAL
Qty: 45 TABLET | Refills: 0 | Status: SHIPPED | OUTPATIENT
Start: 2021-07-30 | End: 2021-09-03 | Stop reason: SDUPTHER

## 2021-06-29 RX ORDER — HYDROCODONE BITARTRATE AND ACETAMINOPHEN 7.5; 325 MG/1; MG/1
TABLET ORAL
Qty: 45 TABLET | Refills: 0 | Status: SHIPPED | OUTPATIENT
Start: 2021-06-30 | End: 2021-11-09 | Stop reason: SDUPTHER

## 2021-06-29 RX ORDER — METHYLPREDNISOLONE ACETATE 40 MG/ML
40 INJECTION, SUSPENSION INTRA-ARTICULAR; INTRALESIONAL; INTRAMUSCULAR; SOFT TISSUE
Status: COMPLETED | OUTPATIENT
Start: 2021-06-29 | End: 2021-06-29

## 2021-06-29 RX ADMIN — METHYLPREDNISOLONE ACETATE 40 MG: 40 INJECTION, SUSPENSION INTRA-ARTICULAR; INTRALESIONAL; INTRAMUSCULAR; SOFT TISSUE at 02:06

## 2021-08-25 ENCOUNTER — PATIENT OUTREACH (OUTPATIENT)
Dept: ADMINISTRATIVE | Facility: OTHER | Age: 66
End: 2021-08-25

## 2021-09-01 ENCOUNTER — TELEPHONE (OUTPATIENT)
Dept: PAIN MEDICINE | Facility: CLINIC | Age: 66
End: 2021-09-01

## 2021-09-03 ENCOUNTER — OFFICE VISIT (OUTPATIENT)
Dept: PAIN MEDICINE | Facility: CLINIC | Age: 66
End: 2021-09-03
Payer: MEDICARE

## 2021-09-03 VITALS — RESPIRATION RATE: 17 BRPM | BODY MASS INDEX: 26.6 KG/M2 | HEIGHT: 71 IN | WEIGHT: 190 LBS

## 2021-09-03 DIAGNOSIS — M25.519 NECK AND SHOULDER PAIN: ICD-10-CM

## 2021-09-03 DIAGNOSIS — Z79.891 OPIOID USE AGREEMENT EXISTS: ICD-10-CM

## 2021-09-03 DIAGNOSIS — M50.30 DDD (DEGENERATIVE DISC DISEASE), CERVICAL: ICD-10-CM

## 2021-09-03 DIAGNOSIS — M79.18 MYOFASCIAL MUSCLE PAIN: Primary | ICD-10-CM

## 2021-09-03 DIAGNOSIS — M54.2 NECK AND SHOULDER PAIN: ICD-10-CM

## 2021-09-03 DIAGNOSIS — M47.812 CERVICAL FACET JOINT SYNDROME: ICD-10-CM

## 2021-09-03 PROCEDURE — 99443 PR PHYSICIAN TELEPHONE EVALUATION 21-30 MIN: ICD-10-PCS | Mod: 95,,, | Performed by: PHYSICIAN ASSISTANT

## 2021-09-03 PROCEDURE — 99443 PR PHYSICIAN TELEPHONE EVALUATION 21-30 MIN: CPT | Mod: 95,,, | Performed by: PHYSICIAN ASSISTANT

## 2021-09-03 RX ORDER — HYDROCODONE BITARTRATE AND ACETAMINOPHEN 7.5; 325 MG/1; MG/1
TABLET ORAL
Qty: 45 TABLET | Refills: 0 | Status: SHIPPED | OUTPATIENT
Start: 2021-10-03 | End: 2021-11-09 | Stop reason: SDUPTHER

## 2021-09-03 RX ORDER — HYDROCODONE BITARTRATE AND ACETAMINOPHEN 7.5; 325 MG/1; MG/1
TABLET ORAL
Qty: 45 TABLET | Refills: 0 | Status: SHIPPED | OUTPATIENT
Start: 2021-09-03 | End: 2021-11-09 | Stop reason: SDUPTHER

## 2021-09-13 ENCOUNTER — TELEPHONE (OUTPATIENT)
Dept: PAIN MEDICINE | Facility: CLINIC | Age: 66
End: 2021-09-13

## 2021-09-13 RX ORDER — TIZANIDINE 4 MG/1
2-4 TABLET ORAL 2 TIMES DAILY PRN
Qty: 60 TABLET | Refills: 0 | Status: SHIPPED | OUTPATIENT
Start: 2021-09-13 | End: 2021-11-09 | Stop reason: SDUPTHER

## 2021-09-29 ENCOUNTER — TELEPHONE (OUTPATIENT)
Dept: PAIN MEDICINE | Facility: CLINIC | Age: 66
End: 2021-09-29

## 2021-11-07 ENCOUNTER — PATIENT OUTREACH (OUTPATIENT)
Dept: ADMINISTRATIVE | Facility: OTHER | Age: 66
End: 2021-11-07
Payer: MEDICARE

## 2021-11-08 ENCOUNTER — TELEPHONE (OUTPATIENT)
Dept: PAIN MEDICINE | Facility: CLINIC | Age: 66
End: 2021-11-08
Payer: MEDICARE

## 2021-11-09 ENCOUNTER — OFFICE VISIT (OUTPATIENT)
Dept: PAIN MEDICINE | Facility: CLINIC | Age: 66
End: 2021-11-09
Payer: MEDICARE

## 2021-11-09 VITALS
HEART RATE: 93 BPM | WEIGHT: 190.06 LBS | RESPIRATION RATE: 17 BRPM | BODY MASS INDEX: 26.61 KG/M2 | HEIGHT: 71 IN | SYSTOLIC BLOOD PRESSURE: 182 MMHG | DIASTOLIC BLOOD PRESSURE: 98 MMHG

## 2021-11-09 DIAGNOSIS — M25.519 NECK AND SHOULDER PAIN: ICD-10-CM

## 2021-11-09 DIAGNOSIS — M79.18 MYOFASCIAL MUSCLE PAIN: Primary | ICD-10-CM

## 2021-11-09 DIAGNOSIS — M50.30 DDD (DEGENERATIVE DISC DISEASE), CERVICAL: ICD-10-CM

## 2021-11-09 DIAGNOSIS — M47.812 CERVICAL FACET JOINT SYNDROME: ICD-10-CM

## 2021-11-09 DIAGNOSIS — Z79.891 OPIOID USE AGREEMENT EXISTS: ICD-10-CM

## 2021-11-09 DIAGNOSIS — M54.2 NECK AND SHOULDER PAIN: ICD-10-CM

## 2021-11-09 PROCEDURE — 99213 OFFICE O/P EST LOW 20 MIN: CPT | Mod: PBBFAC,25 | Performed by: PHYSICIAN ASSISTANT

## 2021-11-09 PROCEDURE — 99214 PR OFFICE/OUTPT VISIT, EST, LEVL IV, 30-39 MIN: ICD-10-PCS | Mod: S$PBB,25,, | Performed by: PHYSICIAN ASSISTANT

## 2021-11-09 PROCEDURE — 99999 PR PBB SHADOW E&M-EST. PATIENT-LVL III: ICD-10-PCS | Mod: PBBFAC,,, | Performed by: PHYSICIAN ASSISTANT

## 2021-11-09 PROCEDURE — 20553 NJX 1/MLT TRIGGER POINTS 3/>: CPT | Mod: PBBFAC | Performed by: PHYSICIAN ASSISTANT

## 2021-11-09 PROCEDURE — 20553 PR INJECT TRIGGER POINTS, > 3: ICD-10-PCS | Mod: S$PBB,,, | Performed by: PHYSICIAN ASSISTANT

## 2021-11-09 PROCEDURE — 99999 PR PBB SHADOW E&M-EST. PATIENT-LVL III: CPT | Mod: PBBFAC,,, | Performed by: PHYSICIAN ASSISTANT

## 2021-11-09 PROCEDURE — 99214 OFFICE O/P EST MOD 30 MIN: CPT | Mod: S$PBB,25,, | Performed by: PHYSICIAN ASSISTANT

## 2021-11-09 PROCEDURE — 20553 NJX 1/MLT TRIGGER POINTS 3/>: CPT | Mod: S$PBB,,, | Performed by: PHYSICIAN ASSISTANT

## 2021-11-09 RX ORDER — METHYLPREDNISOLONE ACETATE 80 MG/ML
80 INJECTION, SUSPENSION INTRA-ARTICULAR; INTRALESIONAL; INTRAMUSCULAR; SOFT TISSUE
Status: COMPLETED | OUTPATIENT
Start: 2021-11-09 | End: 2021-11-09

## 2021-11-09 RX ORDER — TIZANIDINE 4 MG/1
2-4 TABLET ORAL 2 TIMES DAILY PRN
Qty: 60 TABLET | Refills: 0 | Status: SHIPPED | OUTPATIENT
Start: 2021-11-09 | End: 2022-01-06 | Stop reason: SDUPTHER

## 2021-11-09 RX ORDER — HYDROCODONE BITARTRATE AND ACETAMINOPHEN 7.5; 325 MG/1; MG/1
TABLET ORAL
Qty: 45 TABLET | Refills: 0 | Status: SHIPPED | OUTPATIENT
Start: 2021-11-09 | End: 2022-01-06 | Stop reason: SDUPTHER

## 2021-11-09 RX ORDER — HYDROCODONE BITARTRATE AND ACETAMINOPHEN 7.5; 325 MG/1; MG/1
TABLET ORAL
Qty: 45 TABLET | Refills: 0 | Status: SHIPPED | OUTPATIENT
Start: 2021-12-09 | End: 2022-01-06 | Stop reason: SDUPTHER

## 2021-11-09 RX ADMIN — METHYLPREDNISOLONE ACETATE 80 MG: 80 INJECTION, SUSPENSION INTRALESIONAL; INTRAMUSCULAR; INTRASYNOVIAL; SOFT TISSUE at 09:11

## 2021-12-13 ENCOUNTER — TELEPHONE (OUTPATIENT)
Dept: PAIN MEDICINE | Facility: CLINIC | Age: 66
End: 2021-12-13
Payer: MEDICARE

## 2021-12-27 ENCOUNTER — TELEPHONE (OUTPATIENT)
Dept: PAIN MEDICINE | Facility: CLINIC | Age: 66
End: 2021-12-27
Payer: MEDICARE

## 2022-01-05 ENCOUNTER — TELEPHONE (OUTPATIENT)
Dept: PAIN MEDICINE | Facility: CLINIC | Age: 67
End: 2022-01-05
Payer: MEDICARE

## 2022-01-05 NOTE — TELEPHONE ENCOUNTER
Returned patient's call. Offered him to R/S his visit from 1/12/22 to audio call on 1/6/22.  Patient appreciated the call.    ----- Message from Jeanne Paredes sent at 1/5/2022  9:36 AM CST -----  Patient is calling regarding his appt that was cancel and reschedule and his medication. Please call him back at [phone]. Thanks/ar

## 2022-01-06 ENCOUNTER — OFFICE VISIT (OUTPATIENT)
Dept: PAIN MEDICINE | Facility: CLINIC | Age: 67
End: 2022-01-06
Payer: MEDICARE

## 2022-01-06 ENCOUNTER — PATIENT OUTREACH (OUTPATIENT)
Dept: ADMINISTRATIVE | Facility: OTHER | Age: 67
End: 2022-01-06
Payer: MEDICARE

## 2022-01-06 VITALS — BODY MASS INDEX: 26.62 KG/M2 | HEIGHT: 71 IN | WEIGHT: 190.13 LBS

## 2022-01-06 DIAGNOSIS — M54.2 NECK AND SHOULDER PAIN: ICD-10-CM

## 2022-01-06 DIAGNOSIS — M47.812 CERVICAL FACET JOINT SYNDROME: ICD-10-CM

## 2022-01-06 DIAGNOSIS — M50.30 DDD (DEGENERATIVE DISC DISEASE), CERVICAL: ICD-10-CM

## 2022-01-06 DIAGNOSIS — Z79.891 OPIOID USE AGREEMENT EXISTS: ICD-10-CM

## 2022-01-06 DIAGNOSIS — M25.519 NECK AND SHOULDER PAIN: ICD-10-CM

## 2022-01-06 DIAGNOSIS — M79.18 MYOFASCIAL MUSCLE PAIN: Primary | ICD-10-CM

## 2022-01-06 PROCEDURE — 99442 PR PHYSICIAN TELEPHONE EVALUATION 11-20 MIN: ICD-10-PCS | Mod: 95,,, | Performed by: PHYSICIAN ASSISTANT

## 2022-01-06 PROCEDURE — 99442 PR PHYSICIAN TELEPHONE EVALUATION 11-20 MIN: CPT | Mod: 95,,, | Performed by: PHYSICIAN ASSISTANT

## 2022-01-06 RX ORDER — HYDROCODONE BITARTRATE AND ACETAMINOPHEN 7.5; 325 MG/1; MG/1
TABLET ORAL
Qty: 45 TABLET | Refills: 0 | Status: SHIPPED | OUTPATIENT
Start: 2022-01-08 | End: 2022-03-08 | Stop reason: SDUPTHER

## 2022-01-06 RX ORDER — TIZANIDINE 4 MG/1
2-4 TABLET ORAL 2 TIMES DAILY PRN
Qty: 60 TABLET | Refills: 0 | Status: SHIPPED | OUTPATIENT
Start: 2022-01-06 | End: 2022-03-08 | Stop reason: SDUPTHER

## 2022-01-06 RX ORDER — HYDROCODONE BITARTRATE AND ACETAMINOPHEN 7.5; 325 MG/1; MG/1
TABLET ORAL
Qty: 45 TABLET | Refills: 0 | Status: SHIPPED | OUTPATIENT
Start: 2022-02-07 | End: 2022-03-08 | Stop reason: SDUPTHER
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
## 2022-02-03 DIAGNOSIS — I10 HYPERTENSION: ICD-10-CM

## 2022-02-21 RX ORDER — ALBUTEROL SULFATE 90 UG/1
2 AEROSOL, METERED RESPIRATORY (INHALATION)
Qty: 18 G | Refills: 0 | Status: SHIPPED | OUTPATIENT
Start: 2022-02-21 | End: 2022-06-16

## 2022-02-21 NOTE — TELEPHONE ENCOUNTER
----- Message from Kristin Muro sent at 2/21/2022  3:22 PM CST -----  Contact: John Lopez called regarding getting a refill on his      albuterol (PROAIR HFA) 90 mcg/actuation inhaler  albuterol (PROVENTIL/VENTOLIN HFA) 90 mcg/actuation inhaler      To be sent to   [id number] - [place], [place] - [address]  [address] [facility] [address]  Phone: [phone] Fax: [phone]    Thanks  Kb

## 2022-02-21 NOTE — TELEPHONE ENCOUNTER
No new care gaps identified.  Powered by [employer]. Reference number: [id number].   2/21/2022 4:52:06 PM CST

## 2022-02-23 RX ORDER — ALBUTEROL SULFATE 90 UG/1
2 AEROSOL, METERED RESPIRATORY (INHALATION)
Qty: 17 G | OUTPATIENT
Start: 2022-02-23

## 2022-03-07 ENCOUNTER — PATIENT OUTREACH (OUTPATIENT)
Dept: ADMINISTRATIVE | Facility: OTHER | Age: 67
End: 2022-03-07
Payer: MEDICARE
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
## 2022-03-08 ENCOUNTER — OFFICE VISIT (OUTPATIENT)
Dept: PAIN MEDICINE | Facility: CLINIC | Age: 67
End: 2022-03-08
Payer: MEDICARE

## 2022-03-08 VITALS
BODY MASS INDEX: 26.6 KG/M2 | HEIGHT: 71 IN | RESPIRATION RATE: 17 BRPM | HEART RATE: 90 BPM | DIASTOLIC BLOOD PRESSURE: 99 MMHG | SYSTOLIC BLOOD PRESSURE: 208 MMHG | WEIGHT: 190 LBS

## 2022-03-08 DIAGNOSIS — M79.18 MYOFASCIAL MUSCLE PAIN: Primary | ICD-10-CM

## 2022-03-08 DIAGNOSIS — M25.519 NECK AND SHOULDER PAIN: ICD-10-CM

## 2022-03-08 DIAGNOSIS — M47.812 CERVICAL FACET JOINT SYNDROME: ICD-10-CM

## 2022-03-08 DIAGNOSIS — M50.30 DDD (DEGENERATIVE DISC DISEASE), CERVICAL: ICD-10-CM

## 2022-03-08 DIAGNOSIS — Z79.891 OPIOID USE AGREEMENT EXISTS: ICD-10-CM

## 2022-03-08 DIAGNOSIS — M54.2 NECK AND SHOULDER PAIN: ICD-10-CM

## 2022-03-08 PROCEDURE — 20553 NJX 1/MLT TRIGGER POINTS 3/>: CPT | Mod: PBBFAC | Performed by: PHYSICIAN ASSISTANT

## 2022-03-08 PROCEDURE — 20553 NJX 1/MLT TRIGGER POINTS 3/>: CPT | Mod: S$PBB,,, | Performed by: PHYSICIAN ASSISTANT

## 2022-03-08 PROCEDURE — 99999 PR PBB SHADOW E&M-EST. PATIENT-LVL IV: ICD-10-PCS | Mod: PBBFAC,,, | Performed by: PHYSICIAN ASSISTANT

## 2022-03-08 PROCEDURE — 99214 PR OFFICE/OUTPT VISIT, EST, LEVL IV, 30-39 MIN: ICD-10-PCS | Mod: 25,S$PBB,, | Performed by: PHYSICIAN ASSISTANT

## 2022-03-08 PROCEDURE — 99214 OFFICE O/P EST MOD 30 MIN: CPT | Mod: PBBFAC | Performed by: PHYSICIAN ASSISTANT

## 2022-03-08 PROCEDURE — 99999 PR PBB SHADOW E&M-EST. PATIENT-LVL IV: CPT | Mod: PBBFAC,,, | Performed by: PHYSICIAN ASSISTANT

## 2022-03-08 PROCEDURE — 99214 OFFICE O/P EST MOD 30 MIN: CPT | Mod: 25,S$PBB,, | Performed by: PHYSICIAN ASSISTANT

## 2022-03-08 PROCEDURE — 20553 PR INJECT TRIGGER POINTS, > 3: ICD-10-PCS | Mod: S$PBB,,, | Performed by: PHYSICIAN ASSISTANT

## 2022-03-08 RX ORDER — HYDROCODONE BITARTRATE AND ACETAMINOPHEN 7.5; 325 MG/1; MG/1
TABLET ORAL
Qty: 45 TABLET | Refills: 0 | Status: SHIPPED | OUTPATIENT
Start: 2022-04-08 | End: 2022-05-10 | Stop reason: SDUPTHER

## 2022-03-08 RX ORDER — TIZANIDINE 4 MG/1
2-4 TABLET ORAL 2 TIMES DAILY PRN
Qty: 60 TABLET | Refills: 0 | Status: SHIPPED | OUTPATIENT
Start: 2022-03-08 | End: 2022-05-10 | Stop reason: SDUPTHER

## 2022-03-08 RX ORDER — HYDROCODONE BITARTRATE AND ACETAMINOPHEN 7.5; 325 MG/1; MG/1
TABLET ORAL
Qty: 45 TABLET | Refills: 0 | Status: SHIPPED | OUTPATIENT
Start: 2022-03-09 | End: 2022-12-19

## 2022-03-08 RX ORDER — METHYLPREDNISOLONE ACETATE 40 MG/ML
40 INJECTION, SUSPENSION INTRA-ARTICULAR; INTRALESIONAL; INTRAMUSCULAR; SOFT TISSUE
Status: COMPLETED | OUTPATIENT
Start: 2022-03-08 | End: 2022-03-08

## 2022-03-08 RX ADMIN — METHYLPREDNISOLONE ACETATE 40 MG: 40 INJECTION, SUSPENSION INTRA-ARTICULAR; INTRALESIONAL; INTRAMUSCULAR; INTRASYNOVIAL; SOFT TISSUE at 03:03

## 2022-03-08 NOTE — PROGRESS NOTES
Health Maintenance Due   Topic Date Due    COVID-19 Vaccine (1) Never done    Sign Pain Contract  Never done    Complete Opioid Risk Tool  Never done    Colorectal Cancer Screening  Never done    Shingles Vaccine (1 of 2) Never done    Pneumococcal Vaccines (Age 65+) (2 of 2 - PPSV23) 05/06/2020    Influenza Vaccine (1) 09/01/2021    Lipid Panel  12/30/2021     Updates were requested from care everywhere.  Chart was reviewed for overdue Proactive [facility] Encounters (DIMAS) topics (CRS, Breast Cancer Screening, Eye exam)  Health Maintenance has been updated.  LINKS immunization registry triggered.  Immunizations were reconciled.

## 2022-03-18 ENCOUNTER — PES CALL (OUTPATIENT)
Dept: ADMINISTRATIVE | Facility: CLINIC | Age: 67
End: 2022-03-18
Payer: MEDICARE

## 2022-03-29 ENCOUNTER — OFFICE VISIT (OUTPATIENT)
Dept: INTERNAL MEDICINE | Facility: CLINIC | Age: 67
End: 2022-03-29
Payer: MEDICARE

## 2022-03-29 VITALS
HEIGHT: 71 IN | WEIGHT: 222.56 LBS | BODY MASS INDEX: 31.16 KG/M2 | OXYGEN SATURATION: 97 % | HEART RATE: 98 BPM | DIASTOLIC BLOOD PRESSURE: 90 MMHG | SYSTOLIC BLOOD PRESSURE: 138 MMHG | TEMPERATURE: 98 F

## 2022-03-29 VITALS
DIASTOLIC BLOOD PRESSURE: 98 MMHG | OXYGEN SATURATION: 99 % | SYSTOLIC BLOOD PRESSURE: 142 MMHG | HEART RATE: 88 BPM | HEIGHT: 71 IN | BODY MASS INDEX: 31.6 KG/M2 | WEIGHT: 225.75 LBS

## 2022-03-29 DIAGNOSIS — M79.18 MYOFASCIAL MUSCLE PAIN: ICD-10-CM

## 2022-03-29 DIAGNOSIS — E66.9 OBESITY (BMI 30.0-34.9): ICD-10-CM

## 2022-03-29 DIAGNOSIS — R07.81 RIB PAIN ON RIGHT SIDE: Primary | ICD-10-CM

## 2022-03-29 DIAGNOSIS — Z72.0 TOBACCO USE: ICD-10-CM

## 2022-03-29 DIAGNOSIS — Z86.73 HISTORY OF STROKE: ICD-10-CM

## 2022-03-29 DIAGNOSIS — Z59.9 FINANCIAL DIFFICULTIES: ICD-10-CM

## 2022-03-29 DIAGNOSIS — Z00.00 ENCOUNTER FOR PREVENTIVE HEALTH EXAMINATION: Primary | ICD-10-CM

## 2022-03-29 DIAGNOSIS — I70.8 AORTO-ILIAC ATHEROSCLEROSIS: ICD-10-CM

## 2022-03-29 DIAGNOSIS — I70.0 AORTO-ILIAC ATHEROSCLEROSIS: ICD-10-CM

## 2022-03-29 DIAGNOSIS — Z85.46 HISTORY OF PROSTATE CANCER: ICD-10-CM

## 2022-03-29 DIAGNOSIS — G89.29 OTHER CHRONIC PAIN: ICD-10-CM

## 2022-03-29 DIAGNOSIS — R20.2 NUMBNESS AND TINGLING IN BOTH HANDS: ICD-10-CM

## 2022-03-29 DIAGNOSIS — R97.20 ELEVATED PSA: ICD-10-CM

## 2022-03-29 DIAGNOSIS — Z74.8 ASSISTANCE WITH TRANSPORTATION: ICD-10-CM

## 2022-03-29 DIAGNOSIS — Z86.79 HISTORY OF INTRACRANIAL HEMORRHAGE: ICD-10-CM

## 2022-03-29 DIAGNOSIS — E78.5 HYPERLIPIDEMIA, UNSPECIFIED HYPERLIPIDEMIA TYPE: ICD-10-CM

## 2022-03-29 DIAGNOSIS — J44.9 CHRONIC OBSTRUCTIVE PULMONARY DISEASE, UNSPECIFIED COPD TYPE: ICD-10-CM

## 2022-03-29 DIAGNOSIS — Z59.41 FOOD INSECURITY: ICD-10-CM

## 2022-03-29 DIAGNOSIS — I10 PRIMARY HYPERTENSION: ICD-10-CM
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
## 2022-03-29 DIAGNOSIS — R26.9 ABNORMALITY OF GAIT AND MOBILITY: ICD-10-CM

## 2022-03-29 DIAGNOSIS — R20.0 NUMBNESS AND TINGLING IN BOTH HANDS: ICD-10-CM

## 2022-03-29 DIAGNOSIS — Z74.09 OTHER REDUCED MOBILITY: ICD-10-CM

## 2022-03-29 PROBLEM — E66.811 OBESITY (BMI 30.0-34.9): Status: ACTIVE | Noted: 2022-03-29

## 2022-03-29 PROCEDURE — 99215 OFFICE O/P EST HI 40 MIN: CPT | Mod: PBBFAC | Performed by: NURSE PRACTITIONER

## 2022-03-29 PROCEDURE — 99499 UNLISTED E&M SERVICE: CPT | Mod: S$PBB,,, | Performed by: NURSE PRACTITIONER

## 2022-03-29 PROCEDURE — 99999 PR PBB SHADOW E&M-EST. PATIENT-LVL IV: ICD-10-PCS | Mod: PBBFAC,,,

## 2022-03-29 PROCEDURE — 99214 OFFICE O/P EST MOD 30 MIN: CPT | Mod: PBBFAC,27

## 2022-03-29 PROCEDURE — 99499 RISK ADDL DX/OHS AUDIT: ICD-10-PCS | Mod: S$PBB,,, | Performed by: NURSE PRACTITIONER

## 2022-03-29 PROCEDURE — G0439 PR MEDICARE ANNUAL WELLNESS SUBSEQUENT VISIT: ICD-10-PCS | Mod: ,,, | Performed by: NURSE PRACTITIONER

## 2022-03-29 PROCEDURE — 99999 PR PBB SHADOW E&M-EST. PATIENT-LVL V: CPT | Mod: PBBFAC,,, | Performed by: NURSE PRACTITIONER

## 2022-03-29 PROCEDURE — 99999 PR PBB SHADOW E&M-EST. PATIENT-LVL V: ICD-10-PCS | Mod: PBBFAC,,, | Performed by: NURSE PRACTITIONER

## 2022-03-29 PROCEDURE — 99999 PR PBB SHADOW E&M-EST. PATIENT-LVL IV: CPT | Mod: PBBFAC,,,

## 2022-03-29 PROCEDURE — 99214 PR OFFICE/OUTPT VISIT, EST, LEVL IV, 30-39 MIN: ICD-10-PCS | Mod: S$PBB,,,

## 2022-03-29 PROCEDURE — G0439 PPPS, SUBSEQ VISIT: HCPCS | Mod: ,,, | Performed by: NURSE PRACTITIONER

## 2022-03-29 PROCEDURE — 99214 OFFICE O/P EST MOD 30 MIN: CPT | Mod: S$PBB,,,

## 2022-03-29 RX ORDER — METHOCARBAMOL 500 MG/1
500 TABLET, FILM COATED ORAL DAILY PRN
COMMUNITY
End: 2022-05-10

## 2022-03-29 RX ORDER — OMEPRAZOLE 20 MG/1
20 CAPSULE, DELAYED RELEASE ORAL DAILY
COMMUNITY
End: 2023-11-17

## 2022-03-29 SDOH — SOCIAL DETERMINANTS OF HEALTH (SDOH): FOOD INSECURITY: Z59.41

## 2022-03-29 SDOH — SOCIAL DETERMINANTS OF HEALTH (SDOH): PROBLEM RELATED TO HOUSING AND ECONOMIC CIRCUMSTANCES, UNSPECIFIED: Z59.9

## 2022-03-29 NOTE — PATIENT INSTRUCTIONS
Counseling and Referral of Other Preventative  (Italic type indicates deductible and co-insurance are waived)    Patient Name: John Laguna  Today's Date: 3/29/2022    Health Maintenance       Date Due Completion Date    Sign Pain Contract Never done ---    Complete Opioid Risk Tool Never done ---    Colorectal Cancer Screening Never done ---    Shingles Vaccine (1 of 2) Never done ---    Pneumococcal Vaccines (Age 65+) (2 of 2 - PPSV23) 05/06/2020 2/23/2016    Lipid Panel 12/30/2021 12/30/2020    Influenza Vaccine (1) 06/30/2022 (Originally 9/1/2021) 8/28/2020    COVID-19 Vaccine (1) 03/29/2023 (Originally 5/6/1960) ---    TETANUS VACCINE 02/20/2031 2/20/2021        Orders Placed This Encounter   Procedures    Ambulatory referral/consult to Outpatient Case Management     The following information is provided to all patients.  This information is to help you find resources for any of the problems found today that may be affecting your health:                Living healthy guide: www.[facility].[place].gov      Understanding Diabetes: www.diabetes.org      Eating healthy: www.cdc.gov/healthyweight      CDC home safety checklist: www.cdc.gov/steadi/patient.html      Agency on Aging: www.goea.[place].[facility]      Alcoholics anonymous (AA): www.aa.org      Physical Activity: www.tesfaye.nih.gov/hh9qxlc      Tobacco use: www.quitwithusla.org

## 2022-03-29 NOTE — PROGRESS NOTES
"  John Laguna presented for a  Medicare AWV and comprehensive Health Risk Assessment today. The following components were reviewed and updated:    · Medical history  · Family History  · Social history  · Allergies and Current Medications  · Health Risk Assessment  · Health Maintenance  · Care Team         ** See Completed Assessments for Annual Wellness Visit within the encounter summary.**         The following assessments were completed:  · Living Situation  · CAGE  · Depression Screening  · Timed Get Up and Go  · Whisper Test  · Cognitive Function Screening  · Nutrition Screening  · ADL Screening  · PAQ Screening        Vitals:    03/29/22 0737   BP: (!) 142/98   BP Location: Left arm   Patient Position: Sitting   Pulse: 88   SpO2: 99%   Weight: 102.4 kg (225 lb 12 oz)   Height: 5' 11" (1.803 m)     Body mass index is 31.49 kg/m².  Physical Exam  Vitals and nursing note reviewed.   Constitutional:       Appearance: He is well-developed.   HENT:      Head: Normocephalic.   Cardiovascular:      Rate and Rhythm: Normal rate and regular rhythm.      Heart sounds: Normal heart sounds.   Pulmonary:      Effort: Pulmonary effort is normal. No respiratory distress.      Breath sounds: Normal breath sounds.      Comments: Although pulmonary exam limited due to patient's inability to inhale deeply due to pain  Abdominal:      Palpations: Abdomen is soft. There is no mass.      Tenderness: There is no abdominal tenderness.   Musculoskeletal:         General: Normal range of motion.   Skin:     General: Skin is warm and dry.   Neurological:      Mental Status: He is alert and oriented to person, place, and time.      Motor: No abnormal muscle tone.   Psychiatric:         Speech: Speech normal.         Behavior: Behavior normal.               Diagnoses and health risks identified today and associated recommendations/orders:    1. Encounter for preventive health examination  Declines COVID vaccines, flu vaccine, and " colonoscopy. He will discuss FITKIT vs cologuard with PCP at upcoming apt.   Lipid deferred to PCP in event other blood work needed  Discussed receiving Shingrix and pneumonia vaccine at pharmacy.      MA to schedule   PCP  Internal medicine today for urgent visit for possible rib fracture from tripping over a dog into a washing machine over weekend    Reports he is at his respiratory baseline    2. Chronic obstructive pulmonary disease, unspecified COPD type  Stable. Continue current treatment plan as previously prescribed with your  pcp     3. Primary hypertension  BP elevated at today's visit. He is also in pain, see #1 Reports always elevated at home with readings 130s-190s/unsure. Discussed s/s of MI and stroke (patient denies any s/s) and advised to go to the ER/911 if occur.   Advised to follow up with PCP for further evaluation and recommendations. Patient expressed understanding.    He will bring in log to review and machine to correlate.     4. Hyperlipidemia, unspecified hyperlipidemia type  Advised to follow up with PCP for further evaluation and recommendations. Patient expressed understanding.      5. Aorto-iliac atherosclerosis  US 1/21  Stable. Continue current treatment plan as previously prescribed with your  pcp     6. Elevated PSA  2020  Question of prostate cancer in 2012  Advised to follow up with PCP for further evaluation and recommendations. Patient expressed understanding.      7. History of prostate cancer  See #6    8. Financial difficulties  Causing stress.  PHQ 2-1  [facility] financial resources information page given to patient.    Advised to follow up with PCP for further evaluation and recommendations. Patient expressed understanding.    - Ambulatory referral/consult to Outpatient Case Management    9. Assistance with transportation  - Ambulatory referral/consult to Outpatient Case Management    10. Food insecurity  - Ambulatory referral/consult to Outpatient Case Management    11.  Obesity (BMI 30.0-34.9)  Encouraged healthy diet and exercise as tolerated to help bring BMI into normal range.    Continue current treatment plan as previously prescribed with your  pcp     12. History of stroke  Reported per pt in the 80s  Follow up with PCP     13. History of intracranial hemorrhage  S/p shunt  Continue current treatment plan as previously prescribed with your  pcp     14. Tobacco use  Discussed the importance of smoking cessation and advised to quit smoking. Patient expressed understanding. Declines smoking cessation program.     15. Numbness and tingling in both hands  Chronic  Advised to follow up with PCP/pain management for further evaluation and recommendations. Patient expressed understanding.      16. Abnormality of gait and mobility  Abnormal timed get up and go test. Denies any falls in the last 12 months.    Fall precautions reviewed with patient. Advised to follow up with PCP for further recommendations. Patient expressed understanding.       17. Other reduced mobility  See #16      Provided John with a 5-10 year written screening schedule and personal prevention plan. Recommendations were developed using the USPSTF age appropriate recommendations. Education, counseling, and referrals were provided as needed. After Visit Summary printed and given to patient which includes a list of additional screenings\tests needed.    Follow up in about 1 year (around 3/29/2023) for awv.    Katerina Moreira NP  I offered to discuss advanced care planning, including how to pick a person who would make decisions for you if you were unable to make them for yourself, called a health care power of , and what kind of decisions you might make such as use of life sustaining treatments such as ventilators and tube feeding when faced with a life limiting illness recorded on a living will that they will need to know. (How you want to be cared for as you near the end of your natural life)     X Patient  is interested in learning more about how to make advanced directives.  I provided them paperwork and offered to discuss this with them.

## 2022-03-29 NOTE — PROGRESS NOTES
John Laguna  03/29/2022  [id number]    Tori Rosenberg MD  Patient Care Team:  Tori Rosenberg MD as PCP - General (Family Medicine)  Esme Woody LPN as Care Coordinator (Family Medicine)  Ariana Aguilar PA-C as Physician Assistant (Interventional Pain Medicine)  Ariana Aguilar PA-C as Physician Assistant (Interventional Pain Medicine)  Has the patient seen any provider outside of the [facility] network since the last visit? (no). If yes, HIPPA forms completed and records requested.        Visit Type:an urgent visit for a new problem    Chief Complaint:  Chief Complaint   Patient presents with    Rib Injury       History of Present Illness:    66 year old male fell on Sunday afternoon  He tripped over his neighbors dog  He fell against the washer and hit his ribs  It does not hurt when he takes a deep breath  It does hurt when he coughs  He has had broken ribs before but he feels like it is just bruised    He went to a [facility] and informed the provider of his fall  He was schedule an appt after his visit      History:  Past Medical History:   Diagnosis Date    Arthritis     Brain aneurysm     Cancer 2012    PROSTATE    COPD (chronic obstructive pulmonary disease)     Headache(784.0)     Hypertension     ON MEDS     Past Surgical History:   Procedure Laterality Date    BRAIN SURGERY  01/01/2012    AV shunt    CATARACT EXTRACTION, BILATERAL Bilateral     JOINT REPLACEMENT  01/01/2001    left knee    PROSTATE SURGERY      ROTATOR CUFF REPAIR  01/01/2003     Family History   Problem Relation Age of Onset    Heart disease Mother     Heart disease Father     Heart disease Sister     Heart disease Brother     Aneurysm Paternal Uncle      Social History     Socioeconomic History    Marital status: Single   Occupational History    Occupation: retired/disabled   Tobacco Use    Smoking status: Current Every Day Smoker     Packs/day: 0.25     Years: 40.00     Pack years: 10.00     Types: Cigarettes     Smokeless tobacco: Never Used    Tobacco comment: instructed not to smoke after midnight   Substance and Sexual Activity    Alcohol use: No     Alcohol/week: 0.0 standard drinks    Drug use: No    Sexual activity: Yes     Birth control/protection: None     Social Determinants of Health     Financial Resource Strain: High Risk    Difficulty of Paying Living Expenses: Very hard   Food Insecurity: Food Insecurity Present    Worried About Running Out of Food in the Last Year: Often true    Ran Out of Food in the Last Year: Sometimes true   Transportation Needs: Unmet Transportation Needs    Lack of Transportation (Medical): Yes    Lack of Transportation (Non-Medical): Yes   Physical Activity: Inactive    Days of Exercise per Week: 0 days    Minutes of Exercise per Session: 0 min   Stress: Stress Concern Present    Feeling of Stress : Very much   Social Connections: Moderately Isolated    Frequency of Communication with Friends and Family: More than three times a week    Frequency of Social Gatherings with Friends and Family: Once a week    Attends Mu-ism Services: 1 to 4 times per year    Active Member of Clubs or Organizations: No    Attends Club or Organization Meetings: Never    Marital Status:    Housing Stability: High Risk    Unable to Pay for Housing in the Last Year: Yes    Number of Places Lived in the Last Year: 1    Unstable Housing in the Last Year: No     Patient Active Problem List   Diagnosis    Headache, migraine, intractable    Hypertension    COPD (chronic obstructive pulmonary disease)    Tobacco use    Impingement syndrome of left shoulder    Rotator cuff rupture    Hyperlipidemia    Family history of ischemic heart disease (IHD)    Impingement syndrome, shoulder    GERD (gastroesophageal reflux disease)    Rotator cuff arthropathy    Rotator cuff tear    Shoulder arthritis    Elevated PSA    Aorto-iliac atherosclerosis    Obesity (BMI 30.0-34.9)     History of stroke     Review of patient's allergies indicates:   Allergen Reactions    Morphine      Other reaction(s): tongue swelling  Other reaction(s): Hives       The following were reviewed at this visit: active problem list, medication list, allergies, family history, social history, and health maintenance.    Medications:  Current Outpatient Medications on File Prior to Visit   Medication Sig Dispense Refill    acetaminophen (TYLENOL) 650 MG TbSR Take 650 mg by mouth every 8 (eight) hours.      albuterol (PROAIR HFA) 90 mcg/actuation inhaler Inhale 2 puffs into the lungs every 4 to 6 hours as needed for Wheezing. 18 g 0    albuterol (PROVENTIL/VENTOLIN HFA) 90 mcg/actuation inhaler Inhale 2 puffs into the lungs every 6 (six) hours as needed.      butalbital-aspirin-caffeine -40 mg Tab Take by mouth every 4 (four) hours as needed.      fluticasone propionate (FLONASE) 50 mcg/actuation nasal spray SPRAY TWICE IN EACH NOSTRIL DAILY 16 g 5    [START ON 4/8/2022] HYDROcodone-acetaminophen (NORCO) 7.5-325 mg per tablet Take 1 tab PO every 12-24 hours PRN Pain. 30 DAY SUPPLY. 45 tablet 0    HYDROcodone-acetaminophen (NORCO) 7.5-325 mg per tablet Take 1 tab PO every 12-24 hours PRN Pain. 30 DAY SUPPLY. 45 tablet 0    methocarbamoL (ROBAXIN) 500 MG Tab Take 500 mg by mouth daily as needed.      omeprazole (PRILOSEC) 20 MG capsule Take 20 mg by mouth once daily.      tiZANidine (ZANAFLEX) 4 MG tablet Take 0.5-1 tablets (2-4 mg total) by mouth 2 (two) times daily as needed. 60 tablet 0    topiramate (TOPAMAX) 100 MG tablet TAKE 1 TABLET(100 MG) BY MOUTH EVERY DAY 30 tablet 4    amlodipine-benazepril 10-20mg (LOTREL) 10-20 mg per capsule Take 1 capsule by mouth once daily. 90 capsule 3    rosuvastatin (CRESTOR) 20 MG tablet Take 1 tablet (20 mg total) by mouth once daily. 90 tablet 3     No current facility-administered medications on file prior to visit.       Medications have been reviewed and  reconciled with patient at this visit.  Barriers to medications reviewed with patient.    Adverse reactions to current medications reviewed with patient..    Over the counter medications reviewed and reconciled with patient.    Exam:  Wt Readings from Last 3 Encounters:   03/29/22 101 kg (222 lb 8.9 oz)   03/29/22 102.4 kg (225 lb 12 oz)   03/08/22 86.2 kg (190 lb)     Temp Readings from Last 3 Encounters:   03/29/22 98.4 °F (36.9 °C) (Temporal)   01/28/21 98.6 °F (37 °C) (Temporal)   12/29/20 98.1 °F (36.7 °C) (Temporal)     BP Readings from Last 3 Encounters:   03/29/22 (!) 138/90   03/29/22 (!) 142/98   03/08/22 (!) 208/99     Pulse Readings from Last 3 Encounters:   03/29/22 98   03/29/22 88   03/08/22 90     Body mass index is 31.04 kg/m².      Review of Systems   Constitutional: Negative.  Negative for chills and fever.   HENT: Negative.  Negative for congestion, sinus pain and sore throat.    Eyes: Negative for blurred vision and double vision.   Respiratory: Negative for cough, sputum production, shortness of breath and wheezing.    Cardiovascular: Negative for chest pain, palpitations and leg swelling.   Gastrointestinal: Negative for abdominal pain, constipation, diarrhea, heartburn, nausea and vomiting.   Genitourinary: Negative.    Musculoskeletal: Positive for falls and myalgias.   Skin: Negative.  Negative for rash.   Neurological: Negative.    Endo/Heme/Allergies: Negative.  Negative for polydipsia. Does not bruise/bleed easily.   Psychiatric/Behavioral: Negative for depression and substance abuse.     Physical Exam  Vitals and nursing note reviewed.   Constitutional:       General: He is not in acute distress.     Appearance: He is well-developed. He is obese. He is not diaphoretic.   HENT:      Head: Normocephalic and atraumatic.      Right Ear: External ear normal.      Left Ear: External ear normal.      Nose: Nose normal.   Eyes:      General:         Right eye: No discharge.         Left eye: No  discharge.      Conjunctiva/sclera: Conjunctivae normal.      Pupils: Pupils are equal, round, and reactive to light.   Neck:      Thyroid: No thyromegaly.   Cardiovascular:      Rate and Rhythm: Normal rate and regular rhythm.      Pulses: Normal pulses.      Heart sounds: Normal heart sounds. No murmur heard.  Pulmonary:      Effort: Pulmonary effort is normal. No respiratory distress.      Breath sounds: Normal breath sounds. No wheezing.      Comments: Pain with inspiration   Abdominal:      General: Bowel sounds are normal.   Musculoskeletal:         General: Tenderness present.        Arms:       Cervical back: Normal range of motion and neck supple.      Comments: Pain by right rib    Lymphadenopathy:      Cervical: No cervical adenopathy.   Skin:     Capillary Refill: Capillary refill takes less than 2 seconds.   Neurological:      Mental Status: He is alert and oriented to person, place, and time.      Cranial Nerves: No cranial nerve deficit.   Psychiatric:         Behavior: Behavior normal.         Thought Content: Thought content normal.         Judgment: Judgment normal.         Laboratory Reviewed ({Yes)  Lab Results   Component Value Date    WBC 7.34 01/28/2021    HGB 16.2 01/28/2021    HCT 48.8 01/28/2021     01/28/2021    CHOL 224 (H) 12/30/2020    TRIG 75 12/30/2020    HDL 75 12/30/2020    ALT 16 12/30/2020    AST 20 12/30/2020     12/30/2020    K 4.2 12/30/2020     12/30/2020    CREATININE 0.9 12/30/2020    BUN 11 12/30/2020    CO2 29 12/30/2020    TSH 2.685 03/21/2018    PSA 6.5 (H) 12/30/2020    INR 1.1 05/24/2017    HGBA1C 5.3 12/30/2020       John was seen today for rib injury.    Diagnoses and all orders for this visit:    Rib pain on right side  -     X-Ray Chest PA And Lateral; Future    Primary hypertension    Obesity (BMI 30.0-34.9)    Myofascial muscle pain    Other chronic pain    Tobacco use    Fall, initial encounter  -     X-Ray Chest PA And Lateral;  Future    Patient is being followed by pain management  Continue taking Norco/tylenol/muscle relaxer as needed for pain  Encourage to seek immediate medical attention if develop resp distress  Encouraged safety at home         Care Plan/Goals: Reviewed    Goals    None         Follow up: No follow-ups on file.    After visit summary was printed and given to patient upon discharge today.  Patient goals and care plan are included in After Visit Summary.

## 2022-03-30 ENCOUNTER — OUTPATIENT CASE MANAGEMENT (OUTPATIENT)
Dept: ADMINISTRATIVE | Facility: OTHER | Age: 67
End: 2022-03-30
Payer: MEDICARE

## 2022-03-31 ENCOUNTER — TELEPHONE (OUTPATIENT)
Dept: FAMILY MEDICINE | Facility: CLINIC | Age: 67
End: 2022-03-31
Payer: MEDICARE

## 2022-03-31 DIAGNOSIS — R07.81 RIB PAIN: Primary | ICD-10-CM
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
## 2022-03-31 NOTE — TELEPHONE ENCOUNTER
----- Message from Lissette Vernon LMSW sent at 3/31/2022  2:24 PM CDT -----  Greetings, Dr. Rosenberg and or Staff      Patient requesting a bed side commode . If an agreement with request. [facility] place [phone]        Thank you ,    Lissette Vernon LMSW

## 2022-03-31 NOTE — PROGRESS NOTES
Outpatient Care Management   - Low Risk Patient Assessment    Patient: John Laguna  MRN:  [id number]  Date of Service:  3/31/2022  Completed by:  Lissette Vernon LMSW  Referral Date: 03/29/2022  Program: OPCM Low Risk    Reason for Visit   Patient presents with    OPCM SW First Assessment Attempt     03/30/2022    Social Work Assessment - Low/Mod Risk    Plan Of Care    Case Closure       Brief Summary: SW completed assessment with patient. Patient resides alone. Patient denied any family call and check in on him. Patient reports can complete ADL's without assistance. Patient denied needing assistance with food, shelter and medical but needs assistance affording medication. Patient reports inhalers are expensive. SW referred patient to [facility] assistance to assist with medication affordability. Patient reports transportation isnt a barrier currently. Patient reports he has a truck. SW provided patient with information on for [place] on aging for future need. Patient requesting bed side commode. SW will send message to PCP to place orders if an agreement with request. Patient provided information on Advance Directives during AWV.       Patient Summary     OPCM Social Work Assessment (Low/Moderate Risk)    General  Level of Caregiver support: Member independent and does not need caregiver assistance  Have you had to make a decision between paying for any of the following in the last 2 months?: Medication  Transportation means: Self  Employment status: Disabled  Current symptoms: None  Assessments  Was the PHQ Depression Screening completed this visit?: No         Complex Care Plan     Care plan was discussed and completed today with input from patient and/or caregiver.    Patient Instructions     No follow-ups on file.    Todays OPC Self-Management Care Plan was developed with the patients/caregivers input and was based on identified barriers from todays assessment.  Goals were  written today with the patient/caregiver and the patient has agreed to work towards these goals to improve his/her overall well-being. Patient verbalized understanding of the care plan, goals, and all of today's instructions. Encouraged patient/caregiver to communicate with his/her physician and health care team about health conditions and the treatment plan.  Provided my contact information today and encouraged patient/caregiver to call me with any questions as needed.

## 2022-04-18 NOTE — PATIENT INSTRUCTIONS
Protecting Your Neck: Posture and Body Mechanics  Protecting your neck from injuries and pain involves practicing good posture and body mechanics. This may mean correcting bad habits you have related to the way you hold and move your body. The tips below can help you improve your posture and body mechanics.     Using good posture while at your workstation can help prevent pain or injury.   What Is Posture and Why Does It Matter?  Posture is the way you hold your body. For many of us, this means hunching over, thrusting the chin forward, and slouching the shoulders. But this kind of poor posture keeps muscles from properly supporting the neck and puts stress on muscles, disks, ligaments, and joints in your neck. As a result, injury and pain can occur.  How Is Your Posture?  Use a full-length mirror to check your posture. To begin, stand normally. Then slowly back up against a wall. Is there space between your head and the wall? Do you slouch your shoulders? Is your chin pointing up or down? All these can cause neck pain and injury.  Improving Your Posture  Follow these steps to improve your posture:  · Pull your shoulders back.  · Think of the ears, shoulders, and hips as a series of dots. Now, adjust your body to connect the dots in a straight line.  · Keep your chin level.  What Are Body Mechanics and Why Do They Matter?  The way you move and position your body during daily activities is called body mechanics. Good body mechanics help protect the neck. This means learning the right ways to stand, sit, and even sleep. So do whats best for your neck and practice good body mechanics.  Standing   To protect your neck while standing:  · Carry objects close to your body.  · Keep your ears and shoulders in a line while standing or walking.  · To lower yourself, bend at the knees with a straight back. Do this instead of looking down and reaching for objects.  · Work at eye level. Dont reach above your head or tilt your  head back.  Sitting   To protect your neck while sitting:  · Set up your workstation so your monitor is at eye level. Also, use a document guthrie when viewing papers or books.  · Keep your knees at or slightly below the level of your hips.  · Sit up straight, with feet flat on the floor. If your feet dont touch the floor, use a footrest.  · Avoid sitting or driving for long periods. Take frequent breaks.  Sleeping   To protect your neck while sleeping:  · Sleep on your back with a pillow under your knees, or on your side with a pillow between bent knees. This helps align the spine.  · Avoid using pillows that are too high or too low. Instead, use a neck roll or pillow under your neck while you sleep to keep the neck straight.  · Sleep on a mattress that supports you, with a pillow under your neck.  © 6571-9648 [employer] [facility], [address] [place] [address]. All rights reserved. This information is not intended as a substitute for professional medical care. Always follow your healthcare professional's instructions.        Reach and Hold Exercise    Do this exercise on your hands and knees. Keep your knees under your hips and your hands under your shoulders. Keep your spine in a neutral position (not arched or sagging). Keep your ears in line with your shoulders. Hold for a few seconds before starting the exercise.  · Tighten your abdominal muscles and raise one arm straight in front of you, palm down. Hold for 5 seconds, then lower. Repeat 5 times.  · Do the exercise again, this time lifting your arm to the side. Repeat 5 times.  · Do the exercise again, this time lifting your arm backward, palm up. Repeat 5 times.  Switch sides and do each exercise with the other arm.  © 6147-4429 [employer] [facility], [address] [place] [address]. All rights reserved. This information is not intended as a substitute for professional medical care. Always follow your healthcare professional's  instructions.        Shoulder and Upper Back Stretch  To start, stand tall with your ears, shoulders, and hips in line. Your feet should be slightly apart, positioned just under your hips. Focus your eyes directly in front of you.  this position for a few seconds before starting your exercise. This helps increase your awareness of proper posture.  · Reach overhead and slightly back with both arms. Keep your shoulders and neck aligned and your elbows behind your shoulders.  · With your palms facing the ceiling, turn your fingers inward.  · Take a deep breath. Breathe out and lower your elbows toward your buttocks. Hold for 5 seconds, then return to starting position.  · Repeat 3 times.       © 1731-7379 [employer] [facility], [address] [place] [address], [place] [address]. All rights reserved. This information is not intended as a substitute for professional medical care. Always follow your healthcare professional's instructions.        Torticollis (Wry Neck)  Torticollis occurs when muscles on one side of the neck contract (tighten). This causes the neck to twist or tilt to the side. The muscles may also be quite sore. It affects mainly children and young adults, often appearing overnight. It can also affect infants who develop tight neck muscles on one side.  What Causes Torticollis?  Causes of torticollis include:  · Damage to the neck muscles from an accident or other trauma  · Side effect of certain medications or drugs  · Infection of the airways, such as a sore throat  When to Go to the Emergency Room (ER)  All neck problems should be checked by a healthcare provider within 24 hours. Seek emergency care if you can't reach your doctor or these symptoms are present:  · Trouble breathing or swallowing  · Numbness or weakness in the arms and legs  · Trouble walking or speaking  What to Expect in the ER  The neck will be examined, and questions about any current or former medical problems will be asked. X-rays of the  neck may be taken to check for broken bones.  Treatment  The goal in treating torticollis is to relax the neck muscles. The best approach will depend on the cause of the problem. In most cases, one or more of the following may be given:  · Medications to help relax the muscles and reduce swelling  · Hot and cold compresses to help ease muscle tightness  · Botulinum toxin (Botox) injections to prevent further muscle spasms  · A soft neck collar to ease discomfort and help healing  · Physical therapy to help stretch and relax the muscles  Follow-up  Depending upon the cause, torticollis often goes away on its own. Follow up with your healthcare provider as instructed. If symptoms become worse, call your doctor or return to the ER.  © 9888-8487 [employer] [facility], [address] [place] [address], [place] [address]. All rights reserved. This information is not intended as a substitute for professional medical care. Always follow your healthcare professional's instructions.        Understanding Neck Problems       If you suffer from neck pain, youre not alone. Many people have neck pain at some point in their lives. Problems such as poor posture, injury, and wear and tear can lead to neck pain. Your health care provider will work with you to find the treatment thats best for your neck.  Types of Neck Problems  The following problems can cause pain or injury in your neck:  · Strains and sprains: Strains (stretched or torn muscles) and sprains (stretched or torn ligaments) can cause neck pain. Strains and sprains can occur during an accident, or when you overuse your neck through repetitive motion. They can also cause your muscles and ligaments to become inflamed (swollen and painful).  · Whiplash and other injuries: Whiplash can result when an impact throws your head, forcing your neck too far forward (hyperflexion), then too far backward (hyperextension). When combined, the two motions can cause a painful injury to different parts  of your neck, such as muscles, ligaments, or joints. The most common cause of whiplash is a car accident. But it can also happen during a fall or sports injury.  · Weakened disks: A simple action, such as a sneeze or a cough, can cause one of your disks to bulge (herniate). A herniated disk can put pressure on your nerve and cause pain. Over time, disks can also thin out (degenerate). Flattened disks dont cushion vertebrae well and can cause vertebrae to rub together. Rubbing vertebrae can pinch nerves and cause pain.  · Weakened joints: Aging and injury can cause joints to slowly degenerate. Thinned joints can also cause vertebrae to rub together. This can cause abnormal growths of bone (bone spurs) to form on vertebrae. Bone spurs put pressure on nerves, causing pain.  Common Symptoms  If you have a neck problem, you may have one or more of the following symptoms:  · Muscle tension and spasm: You may not be able to move your neck, arms, or shoulders comfortably if you have muscle tension or stiffness in your neck. If your symptoms arent relieved, you may experience muscle spasms, or knots of contracted tissue (trigger points) in areas of your neck and shoulders.  · Aches and pains: Dull aches in your head or neck, sharp pains, and swelling of the soft tissue of your neck and shoulders are common symptoms. If theres pressure on the nerves in your neck, you may feel pain in your arms or hands (referred pain).  · Numbness or weakness: If you injure the nerves in your neck, you may experience numbness, tingling, or weakness in your shoulders, arms, or hands. These symptoms arise when disks or bone spurs press on the nerves in your neck.  © 0378-1341 [employer], [address] [place] [address], [place] [address]. All rights reserved. This information is not intended as a substitute for professional medical care. Always follow your healthcare professional's instructions.        Neck Exercises: Active Neck Rotation  To  start, lie on your back, knees bent and feet flat on the floor. Keep your ears, shoulders, and hips aligned, but dont press your lower back to the floor. Rest your hands on your pelvis. Breathe deeply and relax.  · Use your neck muscles to turn your head to one side until you feel a stretch in the muscles.  · Hold for 5 seconds. Then turn to the other side.  · Repeat 5 times on each side.  Note: Keep your shoulders on the floor. Dont lift or tuck your chin as you turn your head.  © 3019-0093 [employer] [facility], [address] [place] [address]. All rights reserved. This information is not intended as a substitute for professional medical care. Always follow your healthcare professional's instructions.        Neck Exercises: Arm Lift            To start, lie on your back, knees bent and feet flat on the floor. Keep your ears, shoulders, and hips aligned, but dont press your lower back to the floor. Rest your hands on your pelvis. Breathe deeply and relax.  1. Raise one arm overhead, then lower it. As you lower that arm, raise the other arm.  2. Continue to move both arms in slow, smooth arcs. Keep your arms straight and your head and neck relaxed.  3. Repeat 10 times with each arm.  For your safety, check with your healthcare provider before starting an exercise program.   © 7543-5236 [facility], [address] [place] [address]. All rights reserved. This information is not intended as a substitute for professional medical care. Always follow your healthcare professional's instructions.        Exercises: Neck Isometrics  To start, sit in a chair with your feet flat on the floor. Your weight should be slightly forward so that youre balanced evenly on your buttocks. Relax your shoulders and keep your head level. Using a chair with arms may help you keep your balance.  4. Press your palm against your forehead. Resist with your neck muscles. Hold for 10 seconds. Relax. Repeat 5 times.  5. Do the  exercise again, pressing on the side of your head. Repeat 5 times. Switch sides.  6. Do the exercise again, pressing on the back of your head. Repeat 5 times.       For your safety, check with your healthcare provider before starting an exercise program.   © 2179-2280 [place] [address]. All rights reserved. This information is not intended as a substitute for professional medical care. Always follow your healthcare professional's instructions.        Neck Exercises: Passive Neck Rotation        To start, lie on your back, knees bent and feet flat on the floor. Keep your ears, shoulders, and hips aligned, but dont press your lower back to the floor. Rest your hands on your pelvis. Breathe deeply and relax.  · With your neck relaxed, place the palm of one hand on your forehead. Use your hand to turn your head to one side until you feel a stretch in the neck muscles. Do not push through pain.  · Hold for 5 seconds. Then turn to the other side.  · Repeat 5 times on each side.   Note: Keep your shoulders on the floor. Dont lift your chin as you turn your head.  © 5243-0123 [employer] [facility], [address] [place] [address]. All rights reserved. This information is not intended as a substitute for professional medical care. Always follow your healthcare professional's instructions.        Neck Pain [No Trauma]  There are several possible causes of neck pain without injury:  · You can get a minor ligament sprain or muscle strain from a sudden minor neck movement. Sleeping with your neck in an awkward position can also cause this.  · Some persons respond to emotional stress by tensing the muscles of their neck, shoulders and upper back. Chronic spasm in these muscles can cause neck pain and sometimes headaches.  · Gradual wear and tear of the joints in the spine can cause degenerative arthritis. This can be a source of occasional or chronic neck pain.  · With aging or  repeated small injuries to the neck, the spinal disks (the cushions between each spinal bone) may bulge and put pressure on a nearby spinal nerve. This causes tingling, pain or numbness spreading from the neck to the shoulder, arm or hand on one side.  Acute neck pain usually gets better in one to two weeks. Neck pain related to disk disease, arthritis in the spinal joints or spinal stenosis (narrowing of the spinal canal) can become chronic and last for months or years.  Unless you had a forceful physical injury (for example, a car accident or fall), X-rays are usually not ordered for the initial evaluation of neck pain. If pain continues and does not respond to medical treatment, x-rays and other tests may be performed at a later time.  Home Care:  · Rest and relax the muscles. Use a comfortable pillow that supports the head and keeps the spine in a neutral position. The position of the head should not be tilted forward or backward. A rolled up towel may help for a custom fit.  · Some persons find relief with heat (hot shower, hot bath or heating pad) and massage, while others prefer cold packs (crushed or cubed ice in a plastic bag, wrapped in a towel) . Try both and use the method that feels best for 20 minutes several times a day.  · You may use acetaminophen (Tylenol) or ibuprofen (Motrin, Advil) to control pain, unless another medicine was prescribed. [ NOTE : If you have chronic liver or kidney disease or ever had a stomach ulcer or GI bleeding, talk with your doctor before using these medicines.]  Follow Up  with your physician or this facility if your symptoms do not show signs of improvement after one week. Physical therapy or further tests may be needed.  [NOTE: A radiologist will review any X-rays or CT scans that were taken. We will notify you of any new findings that may affect your care.]  Get Prompt Medical Attention  if any of the following occur:  · Pain becomes worse or spreads into one or both  arms  · Weakness or numbness in one or both arms  · Increasing headache  · Neck swelling, difficulty or painful swallowing  · Fever of 100.4ºF (38ºC) or higher, or as directed by your healthcare provider  © 6521-1443 [employer] [facility], [address] [place] [address], [place] [address]. All rights reserved. This information is not intended as a substitute for professional medical care. Always follow your healthcare professional's instructions.      Neck Problems: Relieving Your Symptoms  The first goal of treatment is to relieve your symptoms. Your health care provider may recommend self-care treatments. These include resting, applying ice and heat, taking medication, and doing exercises. Your health care provider may also recommend that you see a physical therapist, who can teach you ways to care for and strengthen your neck.     Heat relaxes sore muscles and helps relieve spasms.   Self-Care Treatments  Pain can end quickly or last awhile. Either way, youll want relief as soon as possible. Your health care provider can tell you which treatments to do at home to help relieve your pain.  · Lying down for a short time takes pressure from the head off the neck.  · Ice and heat can help reduce pain. To bring down swelling, rest an ice pack wrapped in a thin towel on your neck for 15 minutes. To relax sore muscles, apply a warm, wet towel to the area. Or take a warm bath or shower.  · Over-the-counter medications, such as ibuprofen, naproxen, and aspirin, can help reduce pain and swelling. Acetaminophen can help relieve pain. Use these only as directed.  · Exercises can relax muscles and prevent stiffness. To prepare, drape a warm, wet towel around your neck and shoulders for 5 minutes. Remove the towel. Then do any exercises recommended to you by your health care provider.  Physical Therapy  If self-care treatments arent helping relieve neck pain, your health care provider may suggest one or more sessions of physical therapy.  Physical therapy is performed by a specialist trained to treat injuries. Your physical therapist (PT) will teach you how to strengthen muscles, improve the spines alignment, and help you move properly. Treatment methods used in physical therapy may include:  · Heat. A special heating pad called a neck pack may be applied to your neck.  · Exercises. Your PT will teach you exercises to help strengthen your neck and improve its range of motion.  · Joint mobilization. The PT gently moves your vertebrae to help restore motion in your neck joints and reduce neck pain.  · Soft tissue mobilization. The PT massages and stretches the muscles in your neck and shoulders.  · Electrical stimulation. Electrical impulses are sent into your neck. This helps reduce soreness and inflammation.  · Education in body mechanics. The PT shows you ways to position and move your body that protect the neck.  Other Treatments  If physical therapy doesnt relieve your neck pain, your health care provider may suggest other treatments. For example, medications or injections can help relieve pain and swelling. In some cases, surgery may be needed to treat neck problems.  © 1809-9256 [employer], [address] [place] [address], [place] [address]. All rights reserved. This information is not intended as a substitute for professional medical care. Always follow your healthcare professional's instructions.       no

## 2022-04-19 ENCOUNTER — OFFICE VISIT (OUTPATIENT)
Dept: FAMILY MEDICINE | Facility: CLINIC | Age: 67
End: 2022-04-19
Payer: MEDICARE

## 2022-04-19 VITALS
HEIGHT: 71 IN | SYSTOLIC BLOOD PRESSURE: 138 MMHG | DIASTOLIC BLOOD PRESSURE: 80 MMHG | WEIGHT: 230.81 LBS | BODY MASS INDEX: 32.31 KG/M2 | TEMPERATURE: 98 F | HEART RATE: 93 BPM | OXYGEN SATURATION: 98 %

## 2022-04-19 DIAGNOSIS — I10 PRIMARY HYPERTENSION: ICD-10-CM

## 2022-04-19 DIAGNOSIS — Z12.11 COLON CANCER SCREENING: ICD-10-CM

## 2022-04-19 DIAGNOSIS — L72.3 SEBACEOUS CYST: Primary | ICD-10-CM

## 2022-04-19 PROCEDURE — 99999 PR PBB SHADOW E&M-EST. PATIENT-LVL IV: CPT | Mod: PBBFAC,,, | Performed by: FAMILY MEDICINE

## 2022-04-19 PROCEDURE — 99214 PR OFFICE/OUTPT VISIT, EST, LEVL IV, 30-39 MIN: ICD-10-PCS | Mod: S$PBB,,, | Performed by: FAMILY MEDICINE

## 2022-04-19 PROCEDURE — 99999 PR PBB SHADOW E&M-EST. PATIENT-LVL IV: ICD-10-PCS | Mod: PBBFAC,,, | Performed by: FAMILY MEDICINE

## 2022-04-19 PROCEDURE — 99214 OFFICE O/P EST MOD 30 MIN: CPT | Mod: PBBFAC,PO | Performed by: FAMILY MEDICINE

## 2022-04-19 PROCEDURE — 99214 OFFICE O/P EST MOD 30 MIN: CPT | Mod: S$PBB,,, | Performed by: FAMILY MEDICINE

## 2022-04-19 RX ORDER — DOXYCYCLINE HYCLATE 100 MG
100 TABLET ORAL EVERY 12 HOURS
Qty: 20 TABLET | Refills: 0 | Status: SHIPPED | OUTPATIENT
Start: 2022-04-19 | End: 2022-04-29

## 2022-04-19 RX ORDER — BISOPROLOL FUMARATE AND HYDROCHLOROTHIAZIDE 5; 6.25 MG/1; MG/1
1 TABLET ORAL DAILY
Qty: 30 TABLET | Refills: 11 | Status: SHIPPED | OUTPATIENT
Start: 2022-04-19 | End: 2022-12-19

## 2022-04-19 NOTE — PROGRESS NOTES
Chief Complaint:    Chief Complaint   Patient presents with    Cyst     Pt c/o cyst on back of head       History of Present Illness:  Patient with headache, migraine, intractable, COPD, HTN, HLD, GERD, and tobacco use presents today for a bump on his head x 3 weeks.    Painful. Has been getting bigger. Felt a small bump a couple of months ago.   Takes his HTN meds, Tylenol.   Due for colon cancer screening. Doesn't want to do colonoscopy.   Systolic 150 today.       ROS:  Review of Systems   Constitutional: Negative for activity change, chills, fatigue, fever and unexpected weight change.   HENT: Negative for congestion, ear discharge, ear pain, hearing loss, postnasal drip and rhinorrhea.    Eyes: Negative for pain and visual disturbance.   Respiratory: Negative for cough, chest tightness and shortness of breath.    Cardiovascular: Negative for chest pain and palpitations.   Gastrointestinal: Negative for abdominal pain, diarrhea and vomiting.   Endocrine: Negative for heat intolerance.   Genitourinary: Negative for dysuria, flank pain, frequency and hematuria.   Musculoskeletal: Negative for back pain, gait problem and neck pain.   Skin: Positive for rash. Negative for color change.   Neurological: Negative for dizziness, tremors, seizures, numbness and headaches.   Psychiatric/Behavioral: Negative for agitation, hallucinations, self-injury, sleep disturbance and suicidal ideas. The patient is not nervous/anxious.    All other systems reviewed and are negative.      Past Medical History:   Diagnosis Date    Arthritis     Brain aneurysm     Cancer 2012    PROSTATE    COPD (chronic obstructive pulmonary disease)     Headache(784.0)     Hypertension     ON MEDS       Social History:  Social History     Socioeconomic History    Marital status: Single   Occupational History    Occupation: retired/disabled   Tobacco Use    Smoking status: Current Every Day Smoker     Packs/day: 0.25     Years: 40.00      Pack years: 10.00     Types: Cigarettes    Smokeless tobacco: Never Used    Tobacco comment: instructed not to smoke after midnight   Substance and Sexual Activity    Alcohol use: No     Alcohol/week: 0.0 standard drinks    Drug use: No    Sexual activity: Yes     Birth control/protection: None     Social Determinants of Health     Financial Resource Strain: High Risk    Difficulty of Paying Living Expenses: Very hard   Food Insecurity: Food Insecurity Present    Worried About Running Out of Food in the Last Year: Often true    Ran Out of Food in the Last Year: Sometimes true   Transportation Needs: Unmet Transportation Needs    Lack of Transportation (Medical): Yes    Lack of Transportation (Non-Medical): Yes   Physical Activity: Inactive    Days of Exercise per Week: 0 days    Minutes of Exercise per Session: 0 min   Stress: Stress Concern Present    Feeling of Stress : Very much   Social Connections: Moderately Isolated    Frequency of Communication with Friends and Family: More than three times a week    Frequency of Social Gatherings with Friends and Family: Once a week    Attends Yazidism Services: 1 to 4 times per year    Active Member of Clubs or Organizations: No    Attends Club or Organization Meetings: Never    Marital Status:    Housing Stability: High Risk    Unable to Pay for Housing in the Last Year: Yes    Number of Places Lived in the Last Year: 1    Unstable Housing in the Last Year: No       Family History:   family history includes Aneurysm in his paternal uncle; Heart disease in his brother, father, mother, and sister.    Health Maintenance   Topic Date Due    Lipid Panel  12/30/2021    Hemoglobin A1c  12/30/2023    TETANUS VACCINE  02/20/2031    Hepatitis C Screening  Completed    Abdominal Aortic Aneurysm Screening  Completed       Physical Exam:    Vital Signs  Temp: 98.2 °F (36.8 °C)  Pulse: 93  SpO2: 98 %  BP: (!) 142/80  Pain Score: 10-Worst pain  "ever  Height and Weight  Height: 5' 11" (180.3 cm)  Weight: 104.7 kg (230 lb 13.2 oz)  BSA (Calculated - sq m): 2.29 sq meters  BMI (Calculated): 32.2  Weight in (lb) to have BMI = 25: 178.9]    Body mass index is 32.19 kg/m².    Physical Exam  Constitutional:       Appearance: He is well-developed.   HENT:      Head:     Eyes:      Conjunctiva/sclera: Conjunctivae normal.      Pupils: Pupils are equal, round, and reactive to light.   Cardiovascular:      Rate and Rhythm: Normal rate and regular rhythm.      Heart sounds: Normal heart sounds. No murmur heard.  Pulmonary:      Effort: Pulmonary effort is normal. No respiratory distress.      Breath sounds: Normal breath sounds. No wheezing or rales.   Chest:      Chest wall: No tenderness.   Abdominal:      General: There is no distension.      Palpations: Abdomen is soft. There is no mass.      Tenderness: There is no abdominal tenderness. There is no guarding.   Musculoskeletal:         General: No tenderness.      Cervical back: Normal range of motion and neck supple.   Lymphadenopathy:      Cervical: No cervical adenopathy.   Skin:     General: Skin is warm and dry.   Neurological:      Mental Status: He is alert and oriented to person, place, and time.      Deep Tendon Reflexes: Reflexes are normal and symmetric.   Psychiatric:         Behavior: Behavior normal.         Thought Content: Thought content normal.         Judgment: Judgment normal.         Lab Results   Component Value Date    CHOL 224 (H) 12/30/2020    CHOL 139 05/24/2017    CHOL 177 05/03/2016    TRIG 75 12/30/2020    TRIG 143 05/24/2017    TRIG 106 05/03/2016    HDL 75 12/30/2020    HDL 45 05/24/2017    HDL 65 05/03/2016    TOTALCHOLEST 3.0 12/30/2020    TOTALCHOLEST 3.1 05/24/2017    TOTALCHOLEST 2.7 05/03/2016    NONHDLCHOL 149 12/30/2020    NONHDLCHOL 94 05/24/2017    NONHDLCHOL 112 05/03/2016       Lab Results   Component Value Date    HGBA1C 5.3 12/30/2020       Assessment:      ICD-10-CM " ICD-9-CM   1. Sebaceous cyst  L72.3 706.2   2. Primary hypertension  I10 401.9   3. Colon cancer screening  Z12.11 V76.51         Plan:     100 mg doxycycline for sebaceous cyst. Can use warm rags.   Follow up on 04/22 for removal.   If redness or swelling gets worse, or a fever develops, go to ER.  Avoid NSAIDs.     Recommend 5-6.25 mg Ziac and  Continue monitoring blood pressure. Keep < 140/90. Bring blood pressure readings when you return for sebaceous cyst removal.     Will request Cologuard kit for cancer screening.    Orders Placed This Encounter   Procedures    Cologuard Screening (Multitarget Stool DNA)       Current Outpatient Medications   Medication Sig Dispense Refill    acetaminophen (TYLENOL) 650 MG TbSR Take 650 mg by mouth every 8 (eight) hours.      albuterol (PROAIR HFA) 90 mcg/actuation inhaler Inhale 2 puffs into the lungs every 4 to 6 hours as needed for Wheezing. 18 g 0    albuterol (PROVENTIL/VENTOLIN HFA) 90 mcg/actuation inhaler Inhale 2 puffs into the lungs every 6 (six) hours as needed.      amlodipine-benazepril 10-20mg (LOTREL) 10-20 mg per capsule Take 1 capsule by mouth once daily. 90 capsule 3    butalbital-aspirin-caffeine -40 mg Tab Take by mouth every 4 (four) hours as needed.      fluticasone propionate (FLONASE) 50 mcg/actuation nasal spray SPRAY TWICE IN EACH NOSTRIL DAILY 16 g 5    HYDROcodone-acetaminophen (NORCO) 7.5-325 mg per tablet Take 1 tab PO every 12-24 hours PRN Pain. 30 DAY SUPPLY. 45 tablet 0    HYDROcodone-acetaminophen (NORCO) 7.5-325 mg per tablet Take 1 tab PO every 12-24 hours PRN Pain. 30 DAY SUPPLY. 45 tablet 0    methocarbamoL (ROBAXIN) 500 MG Tab Take 500 mg by mouth daily as needed.      omeprazole (PRILOSEC) 20 MG capsule Take 20 mg by mouth once daily.      rosuvastatin (CRESTOR) 20 MG tablet Take 1 tablet (20 mg total) by mouth once daily. 90 tablet 3    tiZANidine (ZANAFLEX) 4 MG tablet Take 0.5-1 tablets (2-4 mg total) by mouth 2  (two) times daily as needed. 60 tablet 0    topiramate (TOPAMAX) 100 MG tablet TAKE 1 TABLET(100 MG) BY MOUTH EVERY DAY 30 tablet 4    bisoprolol-hydrochlorothiazide 5-6.25 mg (ZIAC) 5-6.25 mg Tab Take 1 tablet by mouth once daily. 30 tablet 11    doxycycline (VIBRA-TABS) 100 MG tablet Take 1 tablet (100 mg total) by mouth every 12 (twelve) hours. for 10 days 20 tablet 0     No current facility-administered medications for this visit.       There are no discontinued medications.    Follow up in about 3 days (around 4/22/2022).      Tori Rosenberg MD  Scribe Attestation:   I, Cassie East, am scribing for, and in the presence of, Dr.Arif Rosenberg I performed the above scribed service and the documentation accurately describes the services I performed. I attest to the accuracy of the note.    I, Dr. Tori Rosenberg, reviewed documentation as scribed above. I performed the services described in this documentation.  I agree that the record reflects my personal performance and is accurate and complete. Tori Rosenberg MD.  10/04/2021

## 2022-04-22 ENCOUNTER — PROCEDURE VISIT (OUTPATIENT)
Dept: FAMILY MEDICINE | Facility: CLINIC | Age: 67
End: 2022-04-22
Payer: MEDICARE

## 2022-04-22 DIAGNOSIS — L02.91 ABSCESS: Primary | ICD-10-CM

## 2022-04-22 PROCEDURE — 10060 PR DRAIN SKIN ABSCESS SIMPLE: ICD-10-PCS | Mod: S$PBB,,, | Performed by: FAMILY MEDICINE

## 2022-04-22 PROCEDURE — 10060 I&D ABSCESS SIMPLE/SINGLE: CPT | Mod: S$PBB,,, | Performed by: FAMILY MEDICINE

## 2022-04-22 PROCEDURE — 10060 I&D ABSCESS SIMPLE/SINGLE: CPT | Mod: PBBFAC,PO | Performed by: FAMILY MEDICINE

## 2022-04-22 NOTE — PROCEDURES
Incision & Drainage    Date/Time: 4/22/2022 10:00 AM  Performed by: Tori Rosenberg MD  Authorized by: Tori Rosenberg MD     Consent Done?:  Yes (Written)    Type:  Abscess  Body area:  Head/neck  Anesthesia:  Local infiltration  Local anesthetic: lidocaine 2% with epinephrine  Scalpel size:  11  Incision type:  Single straight  Complexity:  Simple  Drainage:  Pus and serosanguinous  Drainage amount:  Moderate  Wound treatment:  Wound left open, expression of material and wound packed  Packing material:  1/2 in gauze  Patient tolerance:  Patient tolerated the procedure well with no immediate complications    Return for dressing change and couple days and continue antibiotic

## 2022-04-25 ENCOUNTER — CLINICAL SUPPORT (OUTPATIENT)
Dept: FAMILY MEDICINE | Facility: CLINIC | Age: 67
End: 2022-04-25
Payer: MEDICARE

## 2022-04-25 DIAGNOSIS — Z48.00 DRESSING CHANGE: Primary | ICD-10-CM

## 2022-04-25 NOTE — PROGRESS NOTES
Came in today for dressing change, removed bandage, minimal drainage noted.  Packing pulled out , tolerated well. Cleansed with Normal saline.  Dressed with non-stick pad, 4x4 and tape. He will come see me Wednesday for dressing change

## 2022-04-27 ENCOUNTER — CLINICAL SUPPORT (OUTPATIENT)
Dept: FAMILY MEDICINE | Facility: CLINIC | Age: 67
End: 2022-04-27
Payer: MEDICARE

## 2022-04-27 DIAGNOSIS — Z48.00 DRESSING CHANGE: Primary | ICD-10-CM

## 2022-04-27 NOTE — PROGRESS NOTES
Came in for dressing change , bandage removed, small amount of drainage noted. Serosangous.  No sign of infection.  Cleansed with normal saline , non-stick pad applies with tape.    He will come see me again in Friday

## 2022-04-29 ENCOUNTER — CLINICAL SUPPORT (OUTPATIENT)
Dept: FAMILY MEDICINE | Facility: CLINIC | Age: 67
End: 2022-04-29
Payer: MEDICARE

## 2022-04-29 DIAGNOSIS — Z48.00 DRESSING CHANGE: Primary | ICD-10-CM
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
## 2022-05-01 ENCOUNTER — PATIENT OUTREACH (OUTPATIENT)
Dept: ADMINISTRATIVE | Facility: OTHER | Age: 67
End: 2022-05-01
Payer: MEDICARE

## 2022-05-02 ENCOUNTER — TELEPHONE (OUTPATIENT)
Dept: PAIN MEDICINE | Facility: CLINIC | Age: 67
End: 2022-05-02
Payer: MEDICARE

## 2022-05-02 NOTE — TELEPHONE ENCOUNTER
----- Message from Liz Alexander sent at 5/2/2022  9:40 AM CDT -----  Regarding: Returned call  Contact: Patient  Patient is returning a call, please call them back at  [phone]

## 2022-05-02 NOTE — PROGRESS NOTES
Health Maintenance Due   Topic Date Due    Sign Pain Contract  Never done    Complete Opioid Risk Tool  Never done    Colorectal Cancer Screening  Never done    Shingles Vaccine (1 of 2) Never done    Pneumococcal Vaccines (Age 65+) (3 - PPSV23 or PCV20) 05/06/2020    Lipid Panel  12/30/2021     Updates were requested from care everywhere.  Chart was reviewed for overdue Proactive [facility] Encounters (DIMAS) topics (CRS, Breast Cancer Screening, Eye exam)  Health Maintenance has been updated.  LINKS immunization registry triggered.  Immunizations were reconciled.

## 2022-05-02 NOTE — TELEPHONE ENCOUNTER
----- Message from Zac Zamorano sent at 5/2/2022  9:10 AM CDT -----  Contact: [phone]  Patient would like to consult with a nurse in regards to his scheduled appt. Please call back at [phone]. Thanks r/s

## 2022-05-09 ENCOUNTER — PATIENT MESSAGE (OUTPATIENT)
Dept: OPHTHALMOLOGY | Facility: CLINIC | Age: 67
End: 2022-05-09

## 2022-05-09 ENCOUNTER — OFFICE VISIT (OUTPATIENT)
Dept: OPHTHALMOLOGY | Facility: CLINIC | Age: 67
End: 2022-05-09
Payer: MEDICARE

## 2022-05-09 DIAGNOSIS — Z96.1 PSEUDOPHAKIA OF BOTH EYES: Primary | ICD-10-CM

## 2022-05-09 DIAGNOSIS — H52.7 REFRACTIVE ERRORS: ICD-10-CM

## 2022-05-09 PROCEDURE — 92004 PR EYE EXAM, NEW PATIENT,COMPREHESV: ICD-10-PCS | Mod: S$PBB,,, | Performed by: OPTOMETRIST

## 2022-05-09 PROCEDURE — 92004 COMPRE OPH EXAM NEW PT 1/>: CPT | Mod: S$PBB,,, | Performed by: OPTOMETRIST

## 2022-05-09 PROCEDURE — 92015 PR REFRACTION: ICD-10-PCS | Mod: ,,, | Performed by: OPTOMETRIST

## 2022-05-09 PROCEDURE — 92015 DETERMINE REFRACTIVE STATE: CPT | Mod: ,,, | Performed by: OPTOMETRIST
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
## 2022-05-09 NOTE — PROGRESS NOTES
HPI     Annual Exam      Additional comments: Pt is pseudophakic and has HTN.  Vision is good.    No other complaints.  Had brain aneurysm in 2012.          Last edited by Treasure Montiel MA on 5/9/2022  8:56 AM. (History)            Assessment /Plan     For exam results, see Encounter Report.    Pseudophakia of both eyes    Refractive errors      Stable IOL OU.    No HTN Retinopathy    Dispense Final Rx for glasses.  RTC 1 year  Discussed above and answered questions.

## 2022-05-10 ENCOUNTER — OFFICE VISIT (OUTPATIENT)
Dept: PAIN MEDICINE | Facility: CLINIC | Age: 67
End: 2022-05-10
Payer: MEDICARE

## 2022-05-10 VITALS
RESPIRATION RATE: 17 BRPM | BODY MASS INDEX: 32.1 KG/M2 | DIASTOLIC BLOOD PRESSURE: 90 MMHG | WEIGHT: 229.25 LBS | SYSTOLIC BLOOD PRESSURE: 124 MMHG | HEIGHT: 71 IN | HEART RATE: 67 BPM

## 2022-05-10 DIAGNOSIS — M50.30 DDD (DEGENERATIVE DISC DISEASE), CERVICAL: ICD-10-CM

## 2022-05-10 DIAGNOSIS — M79.18 MYOFASCIAL MUSCLE PAIN: ICD-10-CM

## 2022-05-10 DIAGNOSIS — M47.812 CERVICAL FACET JOINT SYNDROME: ICD-10-CM

## 2022-05-10 DIAGNOSIS — M25.519 NECK AND SHOULDER PAIN: Primary | ICD-10-CM

## 2022-05-10 DIAGNOSIS — M54.2 NECK AND SHOULDER PAIN: Primary | ICD-10-CM

## 2022-05-10 PROCEDURE — 99213 OFFICE O/P EST LOW 20 MIN: CPT | Mod: PBBFAC,25 | Performed by: PHYSICIAN ASSISTANT

## 2022-05-10 PROCEDURE — 99999 PR PBB SHADOW E&M-EST. PATIENT-LVL III: CPT | Mod: PBBFAC,,, | Performed by: PHYSICIAN ASSISTANT

## 2022-05-10 PROCEDURE — 20553 NJX 1/MLT TRIGGER POINTS 3/>: CPT | Mod: S$PBB,,, | Performed by: PHYSICIAN ASSISTANT

## 2022-05-10 PROCEDURE — 20553 PR INJECT TRIGGER POINTS, > 3: ICD-10-PCS | Mod: S$PBB,,, | Performed by: PHYSICIAN ASSISTANT

## 2022-05-10 PROCEDURE — 99214 PR OFFICE/OUTPT VISIT, EST, LEVL IV, 30-39 MIN: ICD-10-PCS | Mod: 25,S$PBB,, | Performed by: PHYSICIAN ASSISTANT

## 2022-05-10 PROCEDURE — 99214 OFFICE O/P EST MOD 30 MIN: CPT | Mod: 25,S$PBB,, | Performed by: PHYSICIAN ASSISTANT

## 2022-05-10 PROCEDURE — 20553 NJX 1/MLT TRIGGER POINTS 3/>: CPT | Mod: PBBFAC | Performed by: PHYSICIAN ASSISTANT

## 2022-05-10 PROCEDURE — 99999 PR PBB SHADOW E&M-EST. PATIENT-LVL III: ICD-10-PCS | Mod: PBBFAC,,, | Performed by: PHYSICIAN ASSISTANT

## 2022-05-10 RX ORDER — TIZANIDINE 4 MG/1
2-4 TABLET ORAL 2 TIMES DAILY PRN
Qty: 60 TABLET | Refills: 2 | Status: SHIPPED | OUTPATIENT
Start: 2022-05-10 | End: 2022-12-19

## 2022-05-10 RX ORDER — HYDROCODONE BITARTRATE AND ACETAMINOPHEN 7.5; 325 MG/1; MG/1
TABLET ORAL
Qty: 45 TABLET | Refills: 0 | Status: SHIPPED | OUTPATIENT
Start: 2022-05-10 | End: 2022-12-19

## 2022-05-10 RX ORDER — HYDROCODONE BITARTRATE AND ACETAMINOPHEN 7.5; 325 MG/1; MG/1
TABLET ORAL
Qty: 15 TABLET | Refills: 0 | Status: SHIPPED | OUTPATIENT
Start: 2022-05-10 | End: 2023-03-07 | Stop reason: SDUPTHER

## 2022-05-10 RX ORDER — METHYLPREDNISOLONE ACETATE 80 MG/ML
80 INJECTION, SUSPENSION INTRA-ARTICULAR; INTRALESIONAL; INTRAMUSCULAR; SOFT TISSUE
Status: COMPLETED | OUTPATIENT
Start: 2022-05-10 | End: 2022-05-10

## 2022-05-10 RX ADMIN — METHYLPREDNISOLONE ACETATE 80 MG: 80 INJECTION, SUSPENSION INTRALESIONAL; INTRAMUSCULAR; INTRASYNOVIAL; SOFT TISSUE at 08:05

## 2022-06-16 DIAGNOSIS — J44.9 CHRONIC OBSTRUCTIVE PULMONARY DISEASE, UNSPECIFIED COPD TYPE: Primary | ICD-10-CM

## 2022-06-16 RX ORDER — ALBUTEROL SULFATE 90 UG/1
AEROSOL, METERED RESPIRATORY (INHALATION)
Qty: 18 G | Refills: 0 | Status: SHIPPED | OUTPATIENT
Start: 2022-06-16

## 2022-06-16 NOTE — TELEPHONE ENCOUNTER
lv 4/19/22    ----- Message from Say Alexander sent at 6/16/2022 12:03 PM CDT -----  Pt is needing a call back regarding some refills for prescriptions. Please call .[phone]

## 2022-06-16 NOTE — TELEPHONE ENCOUNTER
No new care gaps identified.  [facility] Embedded Care Gaps. Reference number: [id number]. 6/16/2022   9:58:39 AM PADDYT

## 2022-07-18 ENCOUNTER — TELEPHONE (OUTPATIENT)
Dept: FAMILY MEDICINE | Facility: CLINIC | Age: 67
End: 2022-07-18
Payer: MEDICARE

## 2022-07-18 ENCOUNTER — NURSE TRIAGE (OUTPATIENT)
Dept: ADMINISTRATIVE | Facility: CLINIC | Age: 67
End: 2022-07-18
Payer: MEDICARE

## 2022-07-18 NOTE — TELEPHONE ENCOUNTER
----- Message from Salma Jameson sent at 7/18/2022 10:38 AM CDT -----  Pt called in Re: allergic to reaction to cutting down trees in his yard this weekend his throat he says became enlarged and he is having SOB please call pt at .[phone]. I transferred him to On call nurse     Thanks fernando

## 2022-07-18 NOTE — TELEPHONE ENCOUNTER
"Pt transferred to OOC nurse from scheduling for sob.     Pt c/o difficulty breathing, intermittent trouble swallowing & "feeling like his vocal cord is closing up" since Saturday after cutting down elephant ear tree near home. Hx of copd, has been using inhaler but little improvement. Pt says he's able to swallow cold fluids, but coughs & chokes if he tries to eat foods.     Advised per protocol-to hang up & call 911 now. Pt wanting to schedule clinic apt since closer & does not have transportation home from ER. Reiterated importance of following care advice based on symptoms & questions answered. Pt vu.     Reason for Disposition   Stridor    Additional Information   Negative: SEVERE difficulty breathing (e.g., struggling for each breath, speaks in single words, pulse > 120)   Negative: Breathing stopped and hasn't returned   Negative: Choking on something   Negative: Bluish (or gray) lips or face   Negative: Difficult to awaken or acting confused (e.g., disoriented, slurred speech)   Negative: Passed out (i.e., fainted, collapsed and was not responding)   Negative: Wheezing started suddenly after medicine, an allergic food, or bee sting    Protocols used: BREATHING DIFFICULTY-A-OH      "

## 2022-07-20 ENCOUNTER — HOSPITAL ENCOUNTER (OUTPATIENT)
Dept: RADIOLOGY | Facility: HOSPITAL | Age: 67
Discharge: HOME | End: 2022-07-20
Attending: FAMILY MEDICINE
Payer: MEDICARE

## 2022-07-20 ENCOUNTER — OFFICE VISIT (OUTPATIENT)
Dept: FAMILY MEDICINE | Facility: CLINIC | Age: 67
End: 2022-07-20
Payer: MEDICARE

## 2022-07-20 VITALS
WEIGHT: 234 LBS | DIASTOLIC BLOOD PRESSURE: 88 MMHG | HEART RATE: 79 BPM | HEIGHT: 71 IN | SYSTOLIC BLOOD PRESSURE: 138 MMHG | BODY MASS INDEX: 32.76 KG/M2 | OXYGEN SATURATION: 95 %

## 2022-07-20 DIAGNOSIS — J44.1 COPD EXACERBATION: ICD-10-CM

## 2022-07-20 DIAGNOSIS — J44.1 COPD EXACERBATION: Primary | ICD-10-CM

## 2022-07-20 LAB
CTP QC/QA: YES
SARS-COV-2 RDRP RESP QL NAA+PROBE: NEGATIVE

## 2022-07-20 PROCEDURE — 99999 PR PBB SHADOW E&M-EST. PATIENT-LVL IV: CPT | Mod: PBBFAC,,, | Performed by: FAMILY MEDICINE

## 2022-07-20 PROCEDURE — 96372 THER/PROPH/DIAG INJ SC/IM: CPT | Mod: PBBFAC,59,PO

## 2022-07-20 PROCEDURE — 71046 X-RAY EXAM CHEST 2 VIEWS: CPT | Mod: TC,FY,PO

## 2022-07-20 PROCEDURE — 99999 PR PBB SHADOW E&M-EST. PATIENT-LVL IV: ICD-10-PCS | Mod: PBBFAC,,, | Performed by: FAMILY MEDICINE

## 2022-07-20 PROCEDURE — 99214 OFFICE O/P EST MOD 30 MIN: CPT | Mod: PBBFAC,25,PO | Performed by: FAMILY MEDICINE

## 2022-07-20 PROCEDURE — 71046 XR CHEST PA AND LATERAL: ICD-10-PCS | Mod: 26,,, | Performed by: RADIOLOGY

## 2022-07-20 PROCEDURE — 94640 AIRWAY INHALATION TREATMENT: CPT | Mod: PBBFAC,PO

## 2022-07-20 PROCEDURE — 99214 OFFICE O/P EST MOD 30 MIN: CPT | Mod: S$PBB,,, | Performed by: FAMILY MEDICINE

## 2022-07-20 PROCEDURE — 99214 PR OFFICE/OUTPT VISIT, EST, LEVL IV, 30-39 MIN: ICD-10-PCS | Mod: S$PBB,,, | Performed by: FAMILY MEDICINE

## 2022-07-20 PROCEDURE — 71046 X-RAY EXAM CHEST 2 VIEWS: CPT | Mod: 26,,, | Performed by: RADIOLOGY

## 2022-07-20 PROCEDURE — U0002 COVID-19 LAB TEST NON-CDC: HCPCS | Mod: PBBFAC,PO | Performed by: FAMILY MEDICINE

## 2022-07-20 RX ORDER — ALBUTEROL SULFATE 90 UG/1
2 AEROSOL, METERED RESPIRATORY (INHALATION) EVERY 4 HOURS PRN
Qty: 18 G | Refills: 2 | Status: SHIPPED | OUTPATIENT
Start: 2022-07-20 | End: 2023-07-20

## 2022-07-20 RX ORDER — IPRATROPIUM BROMIDE AND ALBUTEROL SULFATE 2.5; .5 MG/3ML; MG/3ML
3 SOLUTION RESPIRATORY (INHALATION) EVERY 6 HOURS PRN
Qty: 75 ML | Refills: 0 | Status: SHIPPED | OUTPATIENT
Start: 2022-07-20 | End: 2023-07-20

## 2022-07-20 RX ORDER — DOXYCYCLINE HYCLATE 100 MG
100 TABLET ORAL EVERY 12 HOURS
Qty: 20 TABLET | Refills: 0 | Status: SHIPPED | OUTPATIENT
Start: 2022-07-20 | End: 2022-07-30

## 2022-07-20 RX ORDER — IPRATROPIUM BROMIDE AND ALBUTEROL SULFATE 2.5; .5 MG/3ML; MG/3ML
3 SOLUTION RESPIRATORY (INHALATION)
Status: COMPLETED | OUTPATIENT
Start: 2022-07-20 | End: 2022-07-20

## 2022-07-20 RX ORDER — METHYLPREDNISOLONE ACETATE 80 MG/ML
80 INJECTION, SUSPENSION INTRA-ARTICULAR; INTRALESIONAL; INTRAMUSCULAR; SOFT TISSUE
Status: COMPLETED | OUTPATIENT
Start: 2022-07-20 | End: 2022-07-20

## 2022-07-20 RX ORDER — METHYLPREDNISOLONE 4 MG/1
TABLET ORAL
Qty: 1 EACH | Refills: 0 | Status: SHIPPED | OUTPATIENT
Start: 2022-07-20 | End: 2022-12-19

## 2022-07-20 RX ORDER — CLOTRIMAZOLE 1 %
CREAM (GRAM) TOPICAL
Qty: 60 G | Refills: 1 | Status: SHIPPED | OUTPATIENT
Start: 2022-07-20

## 2022-07-20 RX ADMIN — METHYLPREDNISOLONE ACETATE 80 MG: 80 INJECTION, SUSPENSION INTRA-ARTICULAR; INTRALESIONAL; INTRAMUSCULAR; SOFT TISSUE at 03:07

## 2022-07-20 RX ADMIN — IPRATROPIUM BROMIDE AND ALBUTEROL SULFATE 3 ML: 2.5; .5 SOLUTION RESPIRATORY (INHALATION) at 03:07

## 2022-07-20 NOTE — PROGRESS NOTES
Duoneb given via nebulizer, patient tolerated well, recommended 15 minute wait to watch for adverse reactions, O2 sat 90  %  Before nebulizer and 94  % after completed.   Depo medrol 80 mg given IM  LEFT    Ventrogluteal, patient tolerated well, 15 minute wait recommended to watch for adverse reactions.

## 2022-07-20 NOTE — PROGRESS NOTES
Chief Complaint:    Chief Complaint   Patient presents with    Follow-up       History of Present Illness:  Patient with COPD, HTN, HLD, GERD, and tobacco use presents today for an annual follow up    He was cutting down elephant ear plants six days ago. His voice is hoarse and he is wheezing; reports trouble swallowing. He can only eat soup or broth. His skin has been itching and he reports a burning sensation. His fever broke.       ROS:  Review of Systems   Constitutional: Positive for fever. Negative for activity change, chills, fatigue and unexpected weight change.   HENT: Positive for trouble swallowing. Negative for congestion, ear discharge, ear pain, hearing loss, postnasal drip and rhinorrhea.    Eyes: Negative for pain and visual disturbance.   Respiratory: Positive for wheezing. Negative for cough, chest tightness and shortness of breath.    Cardiovascular: Negative for chest pain and palpitations.   Gastrointestinal: Negative for abdominal pain, diarrhea and vomiting.   Endocrine: Negative for heat intolerance.   Genitourinary: Negative for dysuria, flank pain, frequency and hematuria.   Musculoskeletal: Negative for back pain, gait problem and neck pain.   Skin: Positive for rash. Negative for color change.   Neurological: Negative for dizziness, tremors, seizures, numbness and headaches.   Psychiatric/Behavioral: Negative for agitation, hallucinations, self-injury, sleep disturbance and suicidal ideas. The patient is not nervous/anxious.    All other systems reviewed and are negative.      Past Medical History:   Diagnosis Date    Arthritis     Brain aneurysm     Cancer 2012    PROSTATE    COPD (chronic obstructive pulmonary disease)     Headache(784.0)     Hypertension     ON MEDS       Social History:  Social History     Socioeconomic History    Marital status: Single   Occupational History    Occupation: retired/disabled   Tobacco Use    Smoking status: Current Every Day Smoker      Packs/day: 0.25     Years: 40.00     Pack years: 10.00     Types: Cigarettes    Smokeless tobacco: Never Used    Tobacco comment: instructed not to smoke after midnight   Substance and Sexual Activity    Alcohol use: No     Alcohol/week: 0.0 standard drinks    Drug use: No    Sexual activity: Yes     Birth control/protection: None     Social Determinants of Health     Financial Resource Strain: High Risk    Difficulty of Paying Living Expenses: Very hard   Food Insecurity: Food Insecurity Present    Worried About Running Out of Food in the Last Year: Often true    Ran Out of Food in the Last Year: Sometimes true   Transportation Needs: Unmet Transportation Needs    Lack of Transportation (Medical): Yes    Lack of Transportation (Non-Medical): Yes   Physical Activity: Inactive    Days of Exercise per Week: 0 days    Minutes of Exercise per Session: 0 min   Stress: Stress Concern Present    Feeling of Stress : Very much   Social Connections: Moderately Isolated    Frequency of Communication with Friends and Family: More than three times a week    Frequency of Social Gatherings with Friends and Family: Once a week    Attends Synagogue Services: 1 to 4 times per year    Active Member of Clubs or Organizations: No    Attends Club or Organization Meetings: Never    Marital Status:    Housing Stability: High Risk    Unable to Pay for Housing in the Last Year: Yes    Number of Places Lived in the Last Year: 1    Unstable Housing in the Last Year: No       Family History:   family history includes Aneurysm in his paternal uncle; Heart disease in his brother, father, mother, and sister.    Health Maintenance   Topic Date Due    Lipid Panel  12/30/2021    TETANUS VACCINE  02/20/2031    Hepatitis C Screening  Completed    Abdominal Aortic Aneurysm Screening  Completed       Physical Exam:    Vital Signs  Pulse: 79  BP: (!) 140/70  BP Location: Left arm  Patient Position: Sitting  Pain Score:    "8  Height and Weight  Height: 5' 11" (180.3 cm)  Weight: 106.1 kg (234 lb 0.3 oz)  BSA (Calculated - sq m): 2.31 sq meters  BMI (Calculated): 32.7  Weight in (lb) to have BMI = 25: 178.9]    Body mass index is 32.64 kg/m².    Physical Exam  Vitals and nursing note reviewed.   Constitutional:       Appearance: Normal appearance.   HENT:      Head: Normocephalic and atraumatic.      Right Ear: Tympanic membrane normal.      Left Ear: Tympanic membrane normal.      Mouth/Throat:      Pharynx: Posterior oropharyngeal erythema (mild) present.   Eyes:      Extraocular Movements: Extraocular movements intact.      Pupils: Pupils are equal, round, and reactive to light.   Cardiovascular:      Rate and Rhythm: Normal rate and regular rhythm.      Pulses: Normal pulses.      Heart sounds: Normal heart sounds. No murmur heard.    No gallop.   Pulmonary:      Effort: Pulmonary effort is normal. No respiratory distress.      Breath sounds: Wheezing present. No rhonchi or rales.   Abdominal:      General: There is no distension.      Palpations: Abdomen is soft.      Tenderness: There is no abdominal tenderness.   Musculoskeletal:         General: No swelling, deformity or signs of injury. Normal range of motion.      Cervical back: Normal range of motion.   Skin:     General: Skin is warm and dry.      Capillary Refill: Capillary refill takes less than 2 seconds.      Coloration: Skin is not jaundiced or pale.   Neurological:      General: No focal deficit present.      Mental Status: He is alert and oriented to person, place, and time.   Psychiatric:         Mood and Affect: Mood normal.         Behavior: Behavior normal.         Lab Results   Component Value Date    CHOL 224 (H) 12/30/2020    CHOL 139 05/24/2017    CHOL 177 05/03/2016    TRIG 75 12/30/2020    TRIG 143 05/24/2017    TRIG 106 05/03/2016    HDL 75 12/30/2020    HDL 45 05/24/2017    HDL 65 05/03/2016    TOTALCHOLEST 3.0 12/30/2020    TOTALCHOLEST 3.1 05/24/2017    " TOTALCHOLEST 2.7 05/03/2016    NONHDLCHOL 149 12/30/2020    NONHDLCHOL 94 05/24/2017    NONHDLCHOL 112 05/03/2016       Lab Results   Component Value Date    HGBA1C 5.3 12/30/2020       Assessment:      ICD-10-CM ICD-9-CM   1. COPD exacerbation  J44.1 491.21     Plan:    He will receive DuoNeb, Depo-Medrol 80 mg injection and chest x-ray today   Recommend albuterol 90 mcg/actuation inhaler, medrol dosepack 4 mg, doxycycline 100 mg for COPD exacerbation.  Administer methylprednisolone acetate injection 80 mg.  If symptoms worsen, go to ER.   Will reassess patient after breathing treatment.    Patient's breathing improved after a DuoNeb and pulse ox improved to normal.  Discharge instructions given.  Orders Placed This Encounter   Procedures    X-Ray Chest PA And Lateral       Current Outpatient Medications   Medication Sig Dispense Refill    acetaminophen (TYLENOL) 650 MG TbSR Take 650 mg by mouth every 8 (eight) hours.      albuterol (PROVENTIL/VENTOLIN HFA) 90 mcg/actuation inhaler Inhale 2 puffs into the lungs every 6 (six) hours as needed.      amlodipine-benazepril 10-20mg (LOTREL) 10-20 mg per capsule Take 1 capsule by mouth once daily. 90 capsule 3    fluticasone propionate (FLONASE) 50 mcg/actuation nasal spray SPRAY TWICE IN EACH NOSTRIL DAILY 16 g 5    rosuvastatin (CRESTOR) 20 MG tablet Take 1 tablet (20 mg total) by mouth once daily. 90 tablet 3    topiramate (TOPAMAX) 100 MG tablet TAKE 1 TABLET(100 MG) BY MOUTH EVERY DAY 30 tablet 4    VENTOLIN HFA 90 mcg/actuation inhaler INHALE 2 PUFFS INTO THE LUNGS EVERY 4 TO 6 HOURS AS NEEDED FOR WHEEZING 18 g 0    albuterol (PROVENTIL/VENTOLIN HFA) 90 mcg/actuation inhaler Inhale 2 puffs into the lungs every 4 (four) hours as needed for Wheezing. 18 g 2    bisoprolol-hydrochlorothiazide 5-6.25 mg (ZIAC) 5-6.25 mg Tab Take 1 tablet by mouth once daily. (Patient not taking: Reported on 7/20/2022) 30 tablet 11    butalbital-aspirin-caffeine -40 mg  Tab Take by mouth every 4 (four) hours as needed.      doxycycline (VIBRA-TABS) 100 MG tablet Take 1 tablet (100 mg total) by mouth every 12 (twelve) hours. for 10 days 20 tablet 0    HYDROcodone-acetaminophen (NORCO) 7.5-325 mg per tablet Take 1 tab PO every 12-24 hours PRN Pain. 30 DAY SUPPLY. (Patient not taking: Reported on 7/20/2022) 45 tablet 0    HYDROcodone-acetaminophen (NORCO) 7.5-325 mg per tablet Take 1 tab PO every 12-24 hours PRN Pain. 30 DAY SUPPLY. (Patient not taking: Reported on 7/20/2022) 45 tablet 0    HYDROcodone-acetaminophen (NORCO) 7.5-325 mg per tablet Take 1 tab PO every 12-24 hours PRN Pain. 30 DAY SUPPLY. 15 tablet 0    methylPREDNISolone (MEDROL DOSEPACK) 4 mg tablet use as directed 1 each 0    omeprazole (PRILOSEC) 20 MG capsule Take 20 mg by mouth once daily.      tiZANidine (ZANAFLEX) 4 MG tablet Take 0.5-1 tablets (2-4 mg total) by mouth 2 (two) times daily as needed. (Patient not taking: Reported on 7/20/2022) 60 tablet 2     Current Facility-Administered Medications   Medication Dose Route Frequency Provider Last Rate Last Admin    albuterol-ipratropium 2.5 mg-0.5 mg/3 mL nebulizer solution 3 mL  3 mL Nebulization 1 time in Clinic/HOD Tori Rosenberg MD        methylPREDNISolone acetate injection 80 mg  80 mg Intramuscular 1 time in Clinic/HOD Tori Rosenberg MD           There are no discontinued medications.    No follow-ups on file.      Tori Rosenberg MD  Scribe Attestation:   I, Cassie East, am scribing for, and in the presence of, Dr.Arif Rosenberg I performed the above scribed service and the documentation accurately describes the services I performed. I attest to the accuracy of the note.    I, Dr. Tori Rosenberg, reviewed documentation as scribed above. I performed the services described in this documentation.  I agree that the record reflects my personal performance and is accurate and complete. Tori Rosenberg MD.  10/04/2021

## 2022-08-01 ENCOUNTER — TELEPHONE (OUTPATIENT)
Dept: FAMILY MEDICINE | Facility: CLINIC | Age: 67
End: 2022-08-01
Payer: MEDICARE

## 2022-08-01 NOTE — TELEPHONE ENCOUNTER
----- Message from Diamond Oseguera sent at 8/1/2022 11:03 AM CDT -----  Contact: anuradha Hale is requesting a call in regards to breathing machine he was supposed to receive. Please call him back at [phone].          Thanks  DD

## 2022-08-01 NOTE — TELEPHONE ENCOUNTER
Spoke with patient re faxed orders to [facility] an gave him the number to follow up on delivery patient stated okay

## 2022-08-31 ENCOUNTER — TELEPHONE (OUTPATIENT)
Dept: FAMILY MEDICINE | Facility: CLINIC | Age: 67
End: 2022-08-31
Payer: MEDICARE

## 2022-08-31 NOTE — TELEPHONE ENCOUNTER
----- Message from Zac Zamorano sent at 8/31/2022  3:59 PM CDT -----  Contact: [phone]  Type:  Sooner Apoointment Request    Caller is requesting a sooner appointment.  Caller declined first available appointment listed below.  Caller will not accept being placed on the waitlist and is requesting a message be sent to doctor.  Name of Caller:John   When is the first available appointment?10/2022   Symptoms:hand pain concerns   Would the patient rather a call back or a response via [facility]? Call back   Best Call Back Number:[phone]  Additional Information:

## 2022-12-09 ENCOUNTER — HOSPITAL ENCOUNTER (EMERGENCY)
Facility: HOSPITAL | Age: 67
Discharge: HOME | End: 2022-12-10
Attending: EMERGENCY MEDICINE
Payer: MEDICARE

## 2022-12-09 DIAGNOSIS — F10.920 ALCOHOLIC INTOXICATION WITHOUT COMPLICATION: Primary | ICD-10-CM

## 2022-12-09 DIAGNOSIS — S01.01XA SCALP LACERATION: ICD-10-CM

## 2022-12-09 LAB
ALBUMIN SERPL BCP-MCNC: 3.9 G/DL (ref 3.5–5.2)
ALP SERPL-CCNC: 98 U/L (ref 55–135)
ALT SERPL W/O P-5'-P-CCNC: 15 U/L (ref 10–44)
ANION GAP SERPL CALC-SCNC: 13 MMOL/L (ref 8–16)
AST SERPL-CCNC: 28 U/L (ref 10–40)
BASOPHILS # BLD AUTO: 0.09 K/UL (ref 0–0.2)
BASOPHILS NFR BLD: 1.1 % (ref 0–1.9)
BILIRUB SERPL-MCNC: 0.5 MG/DL (ref 0.1–1)
BUN SERPL-MCNC: 7 MG/DL (ref 8–23)
CALCIUM SERPL-MCNC: 8.3 MG/DL (ref 8.7–10.5)
CHLORIDE SERPL-SCNC: 96 MMOL/L (ref 95–110)
CO2 SERPL-SCNC: 21 MMOL/L (ref 23–29)
CREAT SERPL-MCNC: 0.7 MG/DL (ref 0.5–1.4)
DIFFERENTIAL METHOD: ABNORMAL
EOSINOPHIL # BLD AUTO: 0 K/UL (ref 0–0.5)
EOSINOPHIL NFR BLD: 0.5 % (ref 0–8)
ERYTHROCYTE [DISTWIDTH] IN BLOOD BY AUTOMATED COUNT: 12.8 % (ref 11.5–14.5)
EST. GFR  (NO RACE VARIABLE): >60 ML/MIN/1.73 M^2
GLUCOSE SERPL-MCNC: 117 MG/DL (ref 70–110)
HCT VFR BLD AUTO: 47.5 % (ref 40–54)
HEP C VIRUS HOLD SPECIMEN: NORMAL
HGB BLD-MCNC: 16.6 G/DL (ref 14–18)
IMM GRANULOCYTES # BLD AUTO: 0.03 K/UL (ref 0–0.04)
IMM GRANULOCYTES NFR BLD AUTO: 0.4 % (ref 0–0.5)
LYMPHOCYTES # BLD AUTO: 1.3 K/UL (ref 1–4.8)
LYMPHOCYTES NFR BLD: 15.1 % (ref 18–48)
MCH RBC QN AUTO: 32.7 PG (ref 27–31)
MCHC RBC AUTO-ENTMCNC: 34.9 G/DL (ref 32–36)
MCV RBC AUTO: 94 FL (ref 82–98)
MONOCYTES # BLD AUTO: 0.8 K/UL (ref 0.3–1)
MONOCYTES NFR BLD: 9.1 % (ref 4–15)
NEUTROPHILS # BLD AUTO: 6.2 K/UL (ref 1.8–7.7)
NEUTROPHILS NFR BLD: 73.8 % (ref 38–73)
NRBC BLD-RTO: 0 /100 WBC
PLATELET # BLD AUTO: 261 K/UL (ref 150–450)
PMV BLD AUTO: 8.6 FL (ref 9.2–12.9)
POCT GLUCOSE: 108 MG/DL (ref 70–110)
POTASSIUM SERPL-SCNC: 4 MMOL/L (ref 3.5–5.1)
PROT SERPL-MCNC: 7.7 G/DL (ref 6–8.4)
RBC # BLD AUTO: 5.08 M/UL (ref 4.6–6.2)
SODIUM SERPL-SCNC: 130 MMOL/L (ref 136–145)
WBC # BLD AUTO: 8.37 K/UL (ref 3.9–12.7)

## 2022-12-09 PROCEDURE — 85025 COMPLETE CBC W/AUTO DIFF WBC: CPT | Mod: HCNC | Performed by: EMERGENCY MEDICINE

## 2022-12-09 PROCEDURE — 87389 HIV-1 AG W/HIV-1&-2 AB AG IA: CPT | Mod: HCNC | Performed by: EMERGENCY MEDICINE

## 2022-12-09 PROCEDURE — 99284 EMERGENCY DEPT VISIT MOD MDM: CPT | Mod: 25,HCNC

## 2022-12-09 PROCEDURE — 80053 COMPREHEN METABOLIC PANEL: CPT | Mod: HCNC | Performed by: EMERGENCY MEDICINE

## 2022-12-09 PROCEDURE — 86803 HEPATITIS C AB TEST: CPT | Mod: HCNC | Performed by: EMERGENCY MEDICINE

## 2022-12-09 PROCEDURE — 82077 ASSAY SPEC XCP UR&BREATH IA: CPT | Mod: HCNC | Performed by: EMERGENCY MEDICINE

## 2022-12-09 PROCEDURE — 12004 RPR S/N/AX/GEN/TRK7.6-12.5CM: CPT | Mod: HCNC

## 2022-12-09 PROCEDURE — 82962 GLUCOSE BLOOD TEST: CPT | Mod: HCNC

## 2022-12-10 VITALS
DIASTOLIC BLOOD PRESSURE: 86 MMHG | TEMPERATURE: 98 F | HEIGHT: 71 IN | BODY MASS INDEX: 32.64 KG/M2 | RESPIRATION RATE: 18 BRPM | SYSTOLIC BLOOD PRESSURE: 137 MMHG | HEART RATE: 89 BPM | OXYGEN SATURATION: 96 %

## 2022-12-10 LAB
ETHANOL SERPL-MCNC: 331 MG/DL
HCV AB SERPL QL IA: NEGATIVE
HIV 1+2 AB+HIV1 P24 AG SERPL QL IA: NEGATIVE

## 2022-12-10 NOTE — ED NOTES
PT RIDE HERE AND MD MADE AWARE. PT REMOVED PERIPHERAL IV. STATES BEING READY TO GO. PT AMBULATING IN THE [facility] W/ NO ASSIST AND STEADY GAIT.

## 2022-12-10 NOTE — ED PROVIDER NOTES
SCRIBE #1 NOTE: I, Jluis Lauar, am scribing for, and in the presence of, Dionte Donnelly Jr., MD. I have scribed the entire note.       History     Chief Complaint   Patient presents with    Fall with Laceration    Fall with laceration     Pt was attempting to put on his pants and fell, striking his head. Pt has approx 7cm lac to his head. Pt admits To ETOH usage.      Review of patient's allergies indicates:   Allergen Reactions    Morphine      Other reaction(s): tongue swelling  Other reaction(s): Hives         History of Present Illness     HPI    12/9/2022, 9:49 PM  History obtained from the patient      History of Present Illness: John Laguna is a 67 y.o. male patient with a PMHx of HTN who presents to the Emergency Department for evaluation of a fall with a laceration to the head which onset suddenly tonight. Pt admits to drinking EtOH tonight and states he was trying to put on his pants and fell hitting his head. Pt notes his last tetanus shot was 6-7 years ago. Symptoms are constant and moderate in severity. No mitigating or exacerbating factors reported. No associated sxs reported. Patient denies any fever, chills, HA, syncope, dizziness, numbness, weakness, CP, SOB, back pain, and all other sxs at this time. No prior Tx reported. No further complaints or concerns at this time.       Arrival mode: [facility]    PCP: Tori Rosenberg MD        Past Medical History:  Past Medical History:   Diagnosis Date    Arthritis     Brain aneurysm     Cancer 2012    PROSTATE    COPD (chronic obstructive pulmonary disease)     Headache(784.0)     Hypertension     ON MEDS       Past Surgical History:  Past Surgical History:   Procedure Laterality Date    BRAIN SURGERY  01/01/2012    AV shunt    CATARACT EXTRACTION, BILATERAL Bilateral     JOINT REPLACEMENT  01/01/2001    left knee    PROSTATE SURGERY      ROTATOR CUFF REPAIR  01/01/2003         Family History:  Family History   Problem Relation Age of Onset    Heart disease  Mother     Heart disease Father     Heart disease Sister     Heart disease Brother     Aneurysm Paternal Uncle        Social History:  Social History     Tobacco Use    Smoking status: Every Day     Packs/day: 0.25     Years: 40.00     Pack years: 10.00     Types: Cigarettes    Smokeless tobacco: Never    Tobacco comments:     instructed not to smoke after midnight   Substance and Sexual Activity    Alcohol use: No     Alcohol/week: 0.0 standard drinks    Drug use: No    Sexual activity: Yes     Birth control/protection: None        Review of Systems     Review of Systems   Constitutional:  Negative for fever.   HENT:  Negative for sore throat.    Respiratory:  Negative for shortness of breath.    Cardiovascular:  Negative for chest pain.   Gastrointestinal:  Negative for nausea.   Genitourinary:  Negative for dysuria.   Musculoskeletal:  Negative for back pain.   Skin:  Positive for wound (8 cm laceration to frontal head area). Negative for rash.   Neurological:  Negative for weakness.   Hematological:  Does not bruise/bleed easily.   All other systems reviewed and are negative.     Physical Exam     Initial Vitals [12/09/22 2102]   BP Pulse Resp Temp SpO2   (!) 140/92 77 18 97.8 °F (36.6 °C) 95 %      MAP       --          Physical Exam   Nursing Notes and Vital Signs Reviewed.  Constitutional: Patient is in no acute distress. Well-developed and well-nourished.  Head: 8 cm laceration to frontal scalp. No palpable fractures or skull depressions. Normocephalic.  Eyes: PERRL. EOM intact. Conjunctivae are not pale. No scleral icterus.  ENT: Mucous membranes are moist. Oropharynx is clear and symmetric.    Neck: Supple. Full ROM. No lymphadenopathy.  Cardiovascular: Regular rate. Regular rhythm. No murmurs, rubs, or gallops. Distal pulses are 2+ and symmetric.  Pulmonary/Chest: No respiratory distress. Clear to auscultation bilaterally. No wheezing or rales.  Abdominal: Soft and non-distended.  There is no tenderness.   No rebound, guarding, or rigidity. Good bowel sounds.  Genitourinary: No CVA tenderness  Musculoskeletal: Moves all extremities. No obvious deformities. No edema. No calf tenderness.  Skin: Warm and dry.  Neurological:  Alert, awake, and appropriate.  Normal speech.  No acute focal neurological deficits are appreciated.  Psychiatric: Normal affect. Good eye contact. Appropriate in content.     ED Course   Lac Repair    Date/Time: 12/9/2022 11:24 PM  Performed by: Dionte Donnelly Jr., MD  Authorized by: Dionte Donnelly Jr., MD     Consent:     Consent obtained:  Verbal and written    Consent given by:  Patient    Risks, benefits, and alternatives were discussed: yes      Risks discussed:  Infection, pain, poor wound healing and poor cosmetic result  Universal protocol:     Procedure explained and questions answered to patient or proxy's satisfaction: yes      Relevant documents present and verified: yes      Test results available: yes      Imaging studies available: yes      Required blood products, implants, devices, and special equipment available: yes      Site/side marked: yes      Immediately prior to procedure, a time out was called: yes      Patient identity confirmed:  Verbally with patient, arm band and hospital-assigned identification number  Anesthesia:     Anesthesia method:  None  Laceration details:     Location:  Scalp    Scalp location:  Frontal    Length (cm):  8  Pre-procedure details:     Preparation:  Patient was prepped and draped in usual sterile fashion and imaging obtained to evaluate for foreign bodies  Treatment:     Area cleansed with:  Chlorhexidine    Amount of cleaning:  Standard    Irrigation solution:  Sterile water    Irrigation method:  Pressure wash    Visualized foreign bodies/material removed: no      Debridement:  None    Undermining:  None    Scar revision: no    Skin repair:     Repair method:  Staples    Number of staples:  12  Approximation:     Approximation:  Close  Repair  "type:     Repair type:  Simple  Post-procedure details:     Dressing:  Sterile dressing    Procedure completion:  Tolerated well, no immediate complications  ED Vital Signs:  Vitals:    12/09/22 2102 12/09/22 2302 12/09/22 2332 12/09/22 2344   BP: (!) 140/92 121/69 102/60    Pulse: 77 77 77 75   Resp: 18      Temp: 97.8 °F (36.6 °C)      TempSrc: Oral      SpO2: 95% 96% 97% (!) 94%   Height: 5' 11" (1.803 m)          Abnormal Lab Results:  Labs Reviewed   CBC W/ AUTO DIFFERENTIAL - Abnormal; Notable for the following components:       Result Value    MCH 32.7 (*)     MPV 8.6 (*)     Gran % 73.8 (*)     Lymph % 15.1 (*)     All other components within normal limits   COMPREHENSIVE METABOLIC PANEL - Abnormal; Notable for the following components:    Sodium 130 (*)     CO2 21 (*)     Glucose 117 (*)     BUN 7 (*)     Calcium 8.3 (*)     All other components within normal limits   ALCOHOL,MEDICAL (ETHANOL) - Abnormal; Notable for the following components:    Alcohol, Serum 331 (*)     All other components within normal limits    Narrative:       Alc critical result(s) called and verbal readback obtained from   Lilian Strong RN  by LORE 12/10/2022 00:36   HIV 1 / 2 ANTIBODY    Narrative:     Release to patient->Immediate   HEPATITIS C ANTIBODY    Narrative:     Release to patient->Immediate   HEP C VIRUS HOLD SPECIMEN    Narrative:     Release to patient->Immediate   POCT GLUCOSE        All Lab Results:  Results for orders placed or performed during the hospital encounter of 12/09/22   HIV 1/2 Ag/Ab (4th Gen)   Result Value Ref Range    HIV 1/2 Ag/Ab Negative Negative   Hepatitis C Antibody   Result Value Ref Range    Hepatitis C Ab Negative Negative   HCV Virus Hold Specimen   Result Value Ref Range    HEP C Virus Hold Specimen Hold for HCV sendout    CBC auto differential   Result Value Ref Range    WBC 8.37 3.90 - 12.70 K/uL    RBC 5.08 4.60 - 6.20 M/uL    Hemoglobin 16.6 14.0 - 18.0 g/dL    Hematocrit 47.5 40.0 - " 54.0 %    MCV 94 82 - 98 fL    MCH 32.7 (H) 27.0 - 31.0 pg    MCHC 34.9 32.0 - 36.0 g/dL    RDW 12.8 11.5 - 14.5 %    Platelets 261 150 - 450 K/uL    MPV 8.6 (L) 9.2 - 12.9 fL    Immature Granulocytes 0.4 0.0 - 0.5 %    Gran # (ANC) 6.2 1.8 - 7.7 K/uL    Immature Grans (Abs) 0.03 0.00 - 0.04 K/uL    Lymph # 1.3 1.0 - 4.8 K/uL    Mono # 0.8 0.3 - 1.0 K/uL    Eos # 0.0 0.0 - 0.5 K/uL    Baso # 0.09 0.00 - 0.20 K/uL    nRBC 0 0 /100 WBC    Gran % 73.8 (H) 38.0 - 73.0 %    Lymph % 15.1 (L) 18.0 - 48.0 %    Mono % 9.1 4.0 - 15.0 %    Eosinophil % 0.5 0.0 - 8.0 %    Basophil % 1.1 0.0 - 1.9 %    Differential Method Automated    Comprehensive metabolic panel   Result Value Ref Range    Sodium 130 (L) 136 - 145 mmol/L    Potassium 4.0 3.5 - 5.1 mmol/L    Chloride 96 95 - 110 mmol/L    CO2 21 (L) 23 - 29 mmol/L    Glucose 117 (H) 70 - 110 mg/dL    BUN 7 (L) 8 - 23 mg/dL    Creatinine 0.7 0.5 - 1.4 mg/dL    Calcium 8.3 (L) 8.7 - 10.5 mg/dL    Total Protein 7.7 6.0 - 8.4 g/dL    Albumin 3.9 3.5 - 5.2 g/dL    Total Bilirubin 0.5 0.1 - 1.0 mg/dL    Alkaline Phosphatase 98 55 - 135 U/L    AST 28 10 - 40 U/L    ALT 15 10 - 44 U/L    Anion Gap 13 8 - 16 mmol/L    eGFR >60 >60 mL/min/1.73 m^2   Ethanol   Result Value Ref Range    Alcohol, Serum 331 (HH) <10 mg/dL   POCT glucose   Result Value Ref Range    POCT Glucose 108 70 - 110 mg/dL         Imaging Results:  Imaging Results              CT Head Without Contrast (Final result)  Result time 12/09/22 23:01:47      Final result by Krunal Montana MD (12/09/22 23:01:47)                   Impression:      No acute intracranial CT abnormality.    Scalp hematoma    All CT scans at this facility are performed  using dose modulation techniques as appropriate to performed exam including the following:  automated exposure control; adjustment of mA and/or kV according to the patients size (this includes techniques or standardized protocols for targeted exams where dose is matched to  indication/reason for exam: i.e. extremities or head);  iterative reconstruction technique.      Electronically signed by: Krunal Montana  Date:    12/09/2022  Time:    23:01               Narrative:    EXAMINATION:  CT HEAD WITHOUT CONTRAST    CLINICAL HISTORY:  Facial trauma, blunt; Laceration without foreign body of scalp, initial encounter    TECHNIQUE:  Low dose axial CT images obtained throughout the head without intravenous contrast. Sagittal and coronal reconstructions were performed.    COMPARISON:  Multiple priors.    FINDINGS:  Intracranial compartment:    Ventricles and sulci are normal in size for age without evidence of hydrocephalus. No extra-axial blood or fluid collections.    The brain parenchyma appears normal. No parenchymal mass, hemorrhage, edema or major vascular distribution infarct.    Skull/extracranial contents (limited evaluation): No fracture. Mastoid air cells and paranasal sinuses are essentially clear.  Scalp hematoma.                                       CT Cervical Spine Without Contrast (Final result)  Result time 12/09/22 23:06:19      Final result by Krunal Montana MD (12/09/22 23:06:19)                   Impression:      No fracture or traumatic malalignment of the cervical spine.    Multifocal degenerative changes as above.    All CT scans at this facility are performed  using dose modulation techniques as appropriate to performed exam including the following:  automated exposure control; adjustment of mA and/or kV according to the patients size (this includes techniques or standardized protocols for targeted exams where dose is matched to indication/reason for exam: i.e. extremities or head);  iterative reconstruction technique.      Electronically signed by: Krunal Montana  Date:    12/09/2022  Time:    23:06               Narrative:    EXAMINATION:  CT CERVICAL SPINE WITHOUT CONTRAST    CLINICAL HISTORY:  Polytrauma, blunt;    TECHNIQUE:  Low dose axial CT images through  the cervical spine, with sagittal and coronal reformations.  Contrast was not administered.    COMPARISON:  Multiple priors.    FINDINGS:  The vertebral bodies are normal in height and morphology without evidence of fracture or osseous destructive process.  Normal sagittal alignment is preserved.    Moderate spinal canal stenosis at C3-4, C4-5, and C6-7.  Other levels are better preserved.    Facet and uncovertebral joint arthropathy produce multilevel neural foraminal narrowing with up to severe neural foraminal narrowing such as on the right at C5-6.  Up to moderate neural foraminal narrowing elsewhere.    Limited evaluation of the intraspinal contents demonstrates no hematoma or mass.Paraspinal soft tissues exhibit no acute abnormalities.                                         The Emergency Provider reviewed the vital signs and test results, which are outlined above.     ED Discussion       1:03 AM: Reassessed pt at this time. Discussed with pt all pertinent ED information and results. Discussed pt dx and plan of tx. Gave pt all f/u and return to the ED instructions. All questions and concerns were addressed at this time. Pt expresses understanding of information and instructions, and is comfortable with plan to discharge. Pt is stable for discharge.    I discussed with patient and/or family/caretaker that evaluation in the ED does not suggest any emergent or life threatening medical conditions requiring immediate intervention beyond what was provided in the ED, and I believe patient is safe for discharge.  Regardless, an unremarkable evaluation in the ED does not preclude the development or presence of a serious of life threatening condition. As such, patient was instructed to return immediately for any worsening or change in current symptoms.    Regarding LACERATION CARE, patient was instructed to wash hands with soap and warm water before and after caring for wound; keep the wound dry for the first 24 to 48  hours and then gently clean the wound once or twice a day with cool water using soap to clean around the wound; avoid using alcohol or hydrogen peroxide to clean wound unless directed to; and use bandages to keep wound clean and protected and to prevent swelling.  Advised patient to contact primary healthcare provider if wound splits open; becomes extremely painful; appears to not be healing; has a foreign object present; develop a purulent discharge; or note the skin around the wound becoming numb, edematous, or erythematous.  Patient instructed to follow up with primary care provider for wound re-check or closure removal in 8-10 days.    The patient has family members that will observe him/her over the next 24 hours and that are competent to bring him/her back to the Emergency Department if concerning signs or symptoms develop. The family members are comfortable with this responsibility.  I have given detailed written and verbal instructions to the family to bring the patient back to the ED should any concerning signs such as excessive sleepiness, lethargy, confusion, unequal pupils, recurrent vomiting, seizure activity, loss of consciousness, or focal weakness develop.       Medical Decision Making:   Clinical Tests:   Lab Tests: Ordered and Reviewed  Radiological Study: Ordered and Reviewed         ED Medication(s):  Medications - No data to display    New Prescriptions    No medications on file        Follow-up Information       Tori Rosenberg MD. Schedule an appointment as soon as possible for a visit in 1 week.    Specialty: Family Medicine  Why: For wound re-check, For suture removal  Contact information:  [address] [address] [address]  [phone]               [facility] - Emergency Dept..    Specialty: Emergency Medicine  Why: As needed, If symptoms worsen  Contact information:  [address] [facility] [address] [phone]                               Scribe  Attestation:   Scribe #1: I performed the above scribed service and the documentation accurately describes the services I performed. I attest to the accuracy of the note.     Attending:   Physician Attestation Statement for Scribe #1: I, Dionte Donnelly Jr., MD, personally performed the services described in this documentation, as scribed by Jluis Sanchez, in my presence, and it is both accurate and complete.           Clinical Impression       ICD-10-CM ICD-9-CM   1. Alcoholic intoxication without complication  F10.920 305.00   2. Scalp laceration  S01.01XA 873.0       Disposition:   Disposition: Discharged  Condition: Stable       Dionte Donnelly Jr., MD  12/10/22 0124

## 2022-12-10 NOTE — DISCHARGE INSTRUCTIONS
Regarding LACERATION CARE, patient was instructed to wash hands with soap and warm water before and after caring for wound; keep the wound dry for the first 24 to 48 hours and then gently clean the wound once or twice a day with cool water using soap to clean around the wound; avoid using alcohol or hydrogen peroxide to clean wound unless directed to; and use bandages to keep wound clean and protected and to prevent swelling.  Advised patient to contact primary healthcare provider if wound splits open; becomes extremely painful; appears to not be healing; has a foreign object present; develop a purulent discharge; or note the skin around the wound becoming numb, edematous, or erythematous.  Patient instructed to follow up with primary care provider for wound re-check or closure removal in 8-10 days.    The patient has family members that will observe him/her over the next 24 hours and that are competent to bring him/her back to the Emergency Department if concerning signs or symptoms develop. The family members are comfortable with this responsibility.  I have given detailed written and verbal instructions to the family to bring the patient back to the ED should any concerning signs such as excessive sleepiness, lethargy, confusion, unequal pupils, recurrent vomiting, seizure activity, loss of consciousness, or focal weakness develop.

## 2022-12-10 NOTE — ED NOTES
Pt states being able to contact neighbor for a ride. Laceration stapled and bandaged. Pt instructed to stay in bed until ride arrives. MD made aware of results back pt ride on the way.

## 2022-12-12 ENCOUNTER — TELEPHONE (OUTPATIENT)
Dept: FAMILY MEDICINE | Facility: CLINIC | Age: 67
End: 2022-12-12
Payer: MEDICARE

## 2022-12-12 NOTE — TELEPHONE ENCOUNTER
----- Message from Cassie Dominguez sent at 12/12/2022  8:16 AM CST -----  Pt stated he was seen in the hospital 12/9 and had staples put in his head, he is requesting a follow up appt. Call back number is [phone]

## 2022-12-19 ENCOUNTER — OFFICE VISIT (OUTPATIENT)
Dept: FAMILY MEDICINE | Facility: CLINIC | Age: 67
End: 2022-12-19
Payer: MEDICARE

## 2022-12-19 VITALS — WEIGHT: 235.88 LBS | HEIGHT: 71 IN | HEART RATE: 96 BPM | BODY MASS INDEX: 33.02 KG/M2 | OXYGEN SATURATION: 100 %

## 2022-12-19 DIAGNOSIS — Z48.02 VISIT FOR SUTURE REMOVAL: Primary | ICD-10-CM

## 2022-12-19 PROCEDURE — 99999 PR PBB SHADOW E&M-EST. PATIENT-LVL III: CPT | Mod: PBBFAC,HCNC,, | Performed by: FAMILY MEDICINE

## 2022-12-19 PROCEDURE — 99499 UNLISTED E&M SERVICE: CPT | Mod: S$GLB,,, | Performed by: FAMILY MEDICINE

## 2022-12-19 PROCEDURE — 1159F MED LIST DOCD IN RCRD: CPT | Mod: HCNC,CPTII,S$GLB, | Performed by: FAMILY MEDICINE

## 2022-12-19 PROCEDURE — 99999 PR PBB SHADOW E&M-EST. PATIENT-LVL III: ICD-10-PCS | Mod: PBBFAC,HCNC,, | Performed by: FAMILY MEDICINE

## 2022-12-19 PROCEDURE — 3008F PR BODY MASS INDEX (BMI) DOCUMENTED: ICD-10-PCS | Mod: HCNC,CPTII,S$GLB, | Performed by: FAMILY MEDICINE

## 2022-12-19 PROCEDURE — 3008F BODY MASS INDEX DOCD: CPT | Mod: HCNC,CPTII,S$GLB, | Performed by: FAMILY MEDICINE

## 2022-12-19 PROCEDURE — 1125F AMNT PAIN NOTED PAIN PRSNT: CPT | Mod: HCNC,CPTII,S$GLB, | Performed by: FAMILY MEDICINE

## 2022-12-19 PROCEDURE — 1101F PR PT FALLS ASSESS DOC 0-1 FALLS W/OUT INJ PAST YR: ICD-10-PCS | Mod: HCNC,CPTII,S$GLB, | Performed by: FAMILY MEDICINE

## 2022-12-19 PROCEDURE — 99213 OFFICE O/P EST LOW 20 MIN: CPT | Mod: HCNC,S$GLB,, | Performed by: FAMILY MEDICINE

## 2022-12-19 PROCEDURE — 3288F PR FALLS RISK ASSESSMENT DOCUMENTED: ICD-10-PCS | Mod: HCNC,CPTII,S$GLB, | Performed by: FAMILY MEDICINE

## 2022-12-19 PROCEDURE — 99213 PR OFFICE/OUTPT VISIT, EST, LEVL III, 20-29 MIN: ICD-10-PCS | Mod: HCNC,S$GLB,, | Performed by: FAMILY MEDICINE

## 2022-12-19 PROCEDURE — 99499 RISK ADDL DX/OHS AUDIT: ICD-10-PCS | Mod: S$GLB,,, | Performed by: FAMILY MEDICINE

## 2022-12-19 PROCEDURE — 1101F PT FALLS ASSESS-DOCD LE1/YR: CPT | Mod: HCNC,CPTII,S$GLB, | Performed by: FAMILY MEDICINE

## 2022-12-19 PROCEDURE — 1159F PR MEDICATION LIST DOCUMENTED IN MEDICAL RECORD: ICD-10-PCS | Mod: HCNC,CPTII,S$GLB, | Performed by: FAMILY MEDICINE

## 2022-12-19 PROCEDURE — 3288F FALL RISK ASSESSMENT DOCD: CPT | Mod: HCNC,CPTII,S$GLB, | Performed by: FAMILY MEDICINE

## 2022-12-19 PROCEDURE — 1125F PR PAIN SEVERITY QUANTIFIED, PAIN PRESENT: ICD-10-PCS | Mod: HCNC,CPTII,S$GLB, | Performed by: FAMILY MEDICINE

## 2022-12-19 NOTE — PROGRESS NOTES
Chief Complaint:    Chief Complaint   Patient presents with    Suture / Staple Removal     Staple removal from head had a fall        History of Present Illness:  Patient presents today for staple removal.     Patient presented to the ED on 12/09 for alcohol intoxication. He struck his head while trying to put on his pants. Patient received 12 staples to his front scalp for an 8 cm laceration.   Blood pressure elevated today.      Review of Systems   Constitutional:  Negative for appetite change, chills and fever.   HENT:  Negative for congestion, ear pain, postnasal drip, rhinorrhea, sinus pressure and sinus pain.    Eyes:  Negative for pain.   Respiratory:  Negative for cough, chest tightness and shortness of breath.    Cardiovascular:  Negative for chest pain and palpitations.   Gastrointestinal:  Negative for abdominal pain, blood in stool, constipation, diarrhea and nausea.   Genitourinary:  Negative for difficulty urinating, dysuria, flank pain and hematuria.   Musculoskeletal:  Negative for arthralgias and myalgias.   Skin:  Negative for pallor and wound.   Neurological:  Negative for dizziness, tremors, speech difficulty, light-headedness and headaches.   Psychiatric/Behavioral:  Negative for behavioral problems, dysphoric mood and sleep disturbance. The patient is not nervous/anxious.    All other systems reviewed and are negative.    Past Medical History:   Diagnosis Date    Arthritis     Brain aneurysm     Cancer 2012    PROSTATE    COPD (chronic obstructive pulmonary disease)     Headache(784.0)     Hypertension     ON MEDS       Social History:  Social History     Socioeconomic History    Marital status: Single   Occupational History    Occupation: retired/disabled   Tobacco Use    Smoking status: Every Day     Packs/day: 0.25     Years: 40.00     Pack years: 10.00     Types: Cigarettes    Smokeless tobacco: Never    Tobacco comments:     instructed not to smoke after midnight   Substance and Sexual  "Activity    Alcohol use: No     Alcohol/week: 0.0 standard drinks    Drug use: No    Sexual activity: Yes     Birth control/protection: None     Social Determinants of Health     Financial Resource Strain: High Risk    Difficulty of Paying Living Expenses: Very hard   Food Insecurity: Food Insecurity Present    Worried About Running Out of Food in the Last Year: Often true    Ran Out of Food in the Last Year: Sometimes true   Transportation Needs: Unmet Transportation Needs    Lack of Transportation (Medical): Yes    Lack of Transportation (Non-Medical): Yes   Physical Activity: Inactive    Days of Exercise per Week: 0 days    Minutes of Exercise per Session: 0 min   Stress: Stress Concern Present    Feeling of Stress : Very much   Social Connections: Moderately Isolated    Frequency of Communication with Friends and Family: More than three times a week    Frequency of Social Gatherings with Friends and Family: Once a week    Attends [employer] Services: 1 to 4 times per year    Active Member of Clubs or Organizations: No    Attends Club or Organization Meetings: Never    Marital Status:    Housing Stability: High Risk    Unable to Pay for Housing in the Last Year: Yes    Number of Places Lived in the Last Year: 1    Unstable Housing in the Last Year: No       Family History:   family history includes Aneurysm in his paternal uncle; Heart disease in his brother, father, mother, and sister.    Health Maintenance   Topic Date Due    Lipid Panel  12/30/2021    TETANUS VACCINE  02/20/2031    Hepatitis C Screening  Completed    Abdominal Aortic Aneurysm Screening  Completed       Physical Exam:    Vital Signs  Pulse: 96  SpO2: 100 %  Pain Score:   9  Height and Weight  Height: 5' 11" (180.3 cm)  Weight: 107 kg (235 lb 14.3 oz)  BSA (Calculated - sq m): 2.32 sq meters  BMI (Calculated): 32.9  Weight in (lb) to have BMI = 25: 178.9]    Body mass index is 32.9 kg/m².    Physical Exam  Vitals and nursing note " reviewed.   Constitutional:       Appearance: Normal appearance.   HENT:      Head: Normocephalic and atraumatic.        Right Ear: Tympanic membrane normal.      Left Ear: Tympanic membrane normal.   Eyes:      Extraocular Movements: Extraocular movements intact.      Pupils: Pupils are equal, round, and reactive to light.   Cardiovascular:      Rate and Rhythm: Normal rate and regular rhythm.      Pulses: Normal pulses.      Heart sounds: Normal heart sounds. No murmur heard.    No gallop.   Pulmonary:      Effort: Pulmonary effort is normal. No respiratory distress.      Breath sounds: Normal breath sounds. No wheezing, rhonchi or rales.   Abdominal:      General: There is no distension.      Palpations: Abdomen is soft.      Tenderness: There is no abdominal tenderness.   Musculoskeletal:         General: No swelling, deformity or signs of injury. Normal range of motion.      Cervical back: Normal range of motion.   Skin:     General: Skin is warm and dry.      Capillary Refill: Capillary refill takes less than 2 seconds.      Coloration: Skin is not jaundiced or pale.   Neurological:      General: No focal deficit present.      Mental Status: He is alert and oriented to person, place, and time.   Psychiatric:         Mood and Affect: Mood normal.         Behavior: Behavior normal.       Lab Results   Component Value Date    CHOL 224 (H) 12/30/2020    CHOL 139 05/24/2017    CHOL 177 05/03/2016    TRIG 75 12/30/2020    TRIG 143 05/24/2017    TRIG 106 05/03/2016    HDL 75 12/30/2020    HDL 45 05/24/2017    HDL 65 05/03/2016    TOTALCHOLEST 3.0 12/30/2020    TOTALCHOLEST 3.1 05/24/2017    TOTALCHOLEST 2.7 05/03/2016    NONHDLCHOL 149 12/30/2020    NONHDLCHOL 94 05/24/2017    NONHDLCHOL 112 05/03/2016       Lab Results   Component Value Date    HGBA1C 5.3 12/30/2020       Assessment:      ICD-10-CM ICD-9-CM   1. Visit for suture removal  Z48.02 V58.32       Plan:  Removed 12 staples. Laceration is healing well.  Avoid scrubbing wound, will take several weeks to completely heal.   No orders of the defined types were placed in this encounter.    Current Outpatient Medications   Medication Sig Dispense Refill    acetaminophen (TYLENOL) 650 MG TbSR Take 650 mg by mouth every 8 (eight) hours.      albuterol (PROVENTIL/VENTOLIN HFA) 90 mcg/actuation inhaler Inhale 2 puffs into the lungs every 6 (six) hours as needed.      albuterol (PROVENTIL/VENTOLIN HFA) 90 mcg/actuation inhaler Inhale 2 puffs into the lungs every 4 (four) hours as needed for Wheezing. 18 g 2    albuterol (PROVENTIL/VENTOLIN HFA) 90 mcg/actuation inhaler INHALE 2 PUFFS INTO THE LUNGS EVERY 4 TO 6 HOURS AS NEEDED FOR WHEEZING 17 g 3    albuterol-ipratropium (DUO-NEB) 2.5 mg-0.5 mg/3 mL nebulizer solution Take 3 mLs by nebulization every 6 (six) hours as needed for Wheezing. Rescue 75 mL 0    amlodipine-benazepril 10-20mg (LOTREL) 10-20 mg per capsule Take 1 capsule by mouth once daily. 90 capsule 3    butalbital-aspirin-caffeine -40 mg Tab Take by mouth every 4 (four) hours as needed.      clotrimazole (LOTRIMIN) 1 % cream Apply to left hand twice daily 60 g 1    fluticasone propionate (FLONASE) 50 mcg/actuation nasal spray SPRAY TWICE IN EACH NOSTRIL DAILY 16 g 5    HYDROcodone-acetaminophen (NORCO) 7.5-325 mg per tablet Take 1 tab PO every 12-24 hours PRN Pain. 30 DAY SUPPLY. 15 tablet 0    omeprazole (PRILOSEC) 20 MG capsule Take 20 mg by mouth once daily.      topiramate (TOPAMAX) 100 MG tablet TAKE 1 TABLET(100 MG) BY MOUTH EVERY DAY 30 tablet 4    VENTOLIN HFA 90 mcg/actuation inhaler INHALE 2 PUFFS INTO THE LUNGS EVERY 4 TO 6 HOURS AS NEEDED FOR WHEEZING 18 g 0    rosuvastatin (CRESTOR) 20 MG tablet Take 1 tablet (20 mg total) by mouth once daily. 90 tablet 3     No current facility-administered medications for this visit.       Medications Discontinued During This Encounter   Medication Reason    bisoprolol-hydrochlorothiazide 5-6.25 mg (ZIAC)  5-6.25 mg Tab Patient no longer taking    HYDROcodone-acetaminophen (NORCO) 7.5-325 mg per tablet Patient no longer taking    HYDROcodone-acetaminophen (NORCO) 7.5-325 mg per tablet Patient no longer taking    methylPREDNISolone (MEDROL DOSEPACK) 4 mg tablet Patient no longer taking    tiZANidine (ZANAFLEX) 4 MG tablet Patient no longer taking       No follow-ups on file.      Tori Rosenberg MD  Scribe Attestation:   I, Cassie East, am scribing for, and in the presence of, Dr.Arif Rosenberg I performed the above scribed service and the documentation accurately describes the services I performed. I attest to the accuracy of the note.    I, Dr. Tori Rosenberg, reviewed documentation as scribed above. I performed the services described in this documentation.  I agree that the record reflects my personal performance and is accurate and complete. Tori Rosenberg MD.  12/19/2022

## 2023-02-14 ENCOUNTER — TELEPHONE (OUTPATIENT)
Dept: FAMILY MEDICINE | Facility: CLINIC | Age: 68
End: 2023-02-14
Payer: MEDICARE

## 2023-02-14 NOTE — TELEPHONE ENCOUNTER
----- Message from Kai Tenorio sent at 2/14/2023  1:35 PM CST -----  Contact: John Lopez is calling in regards to getting scheduled for an appointment and getting medications. There are no av available appointments. Please call him at [phone]

## 2023-03-07 ENCOUNTER — OFFICE VISIT (OUTPATIENT)
Dept: FAMILY MEDICINE | Facility: CLINIC | Age: 68
End: 2023-03-07
Payer: MEDICARE

## 2023-03-07 ENCOUNTER — TELEPHONE (OUTPATIENT)
Dept: FAMILY MEDICINE | Facility: CLINIC | Age: 68
End: 2023-03-07
Payer: MEDICARE

## 2023-03-07 VITALS — HEIGHT: 71 IN | OXYGEN SATURATION: 94 % | BODY MASS INDEX: 33.61 KG/M2 | HEART RATE: 94 BPM | WEIGHT: 240.06 LBS

## 2023-03-07 DIAGNOSIS — S46.219A BICEPS TENDON TEAR: Primary | ICD-10-CM

## 2023-03-07 DIAGNOSIS — M47.812 CERVICAL FACET JOINT SYNDROME: ICD-10-CM

## 2023-03-07 PROCEDURE — 99999 PR PBB SHADOW E&M-EST. PATIENT-LVL IV: ICD-10-PCS | Mod: PBBFAC,HCNC,, | Performed by: FAMILY MEDICINE

## 2023-03-07 PROCEDURE — 3288F FALL RISK ASSESSMENT DOCD: CPT | Mod: HCNC,CPTII,S$GLB, | Performed by: FAMILY MEDICINE

## 2023-03-07 PROCEDURE — 3008F BODY MASS INDEX DOCD: CPT | Mod: HCNC,CPTII,S$GLB, | Performed by: FAMILY MEDICINE

## 2023-03-07 PROCEDURE — 99213 OFFICE O/P EST LOW 20 MIN: CPT | Mod: HCNC,S$GLB,, | Performed by: FAMILY MEDICINE

## 2023-03-07 PROCEDURE — 99213 PR OFFICE/OUTPT VISIT, EST, LEVL III, 20-29 MIN: ICD-10-PCS | Mod: HCNC,S$GLB,, | Performed by: FAMILY MEDICINE

## 2023-03-07 PROCEDURE — 1101F PR PT FALLS ASSESS DOC 0-1 FALLS W/OUT INJ PAST YR: ICD-10-PCS | Mod: HCNC,CPTII,S$GLB, | Performed by: FAMILY MEDICINE

## 2023-03-07 PROCEDURE — 1159F MED LIST DOCD IN RCRD: CPT | Mod: HCNC,CPTII,S$GLB, | Performed by: FAMILY MEDICINE

## 2023-03-07 PROCEDURE — 3008F PR BODY MASS INDEX (BMI) DOCUMENTED: ICD-10-PCS | Mod: HCNC,CPTII,S$GLB, | Performed by: FAMILY MEDICINE

## 2023-03-07 PROCEDURE — 1125F AMNT PAIN NOTED PAIN PRSNT: CPT | Mod: HCNC,CPTII,S$GLB, | Performed by: FAMILY MEDICINE

## 2023-03-07 PROCEDURE — 99999 PR PBB SHADOW E&M-EST. PATIENT-LVL IV: CPT | Mod: PBBFAC,HCNC,, | Performed by: FAMILY MEDICINE

## 2023-03-07 PROCEDURE — 1125F PR PAIN SEVERITY QUANTIFIED, PAIN PRESENT: ICD-10-PCS | Mod: HCNC,CPTII,S$GLB, | Performed by: FAMILY MEDICINE

## 2023-03-07 PROCEDURE — 1159F PR MEDICATION LIST DOCUMENTED IN MEDICAL RECORD: ICD-10-PCS | Mod: HCNC,CPTII,S$GLB, | Performed by: FAMILY MEDICINE

## 2023-03-07 PROCEDURE — 1101F PT FALLS ASSESS-DOCD LE1/YR: CPT | Mod: HCNC,CPTII,S$GLB, | Performed by: FAMILY MEDICINE

## 2023-03-07 PROCEDURE — 3288F PR FALLS RISK ASSESSMENT DOCUMENTED: ICD-10-PCS | Mod: HCNC,CPTII,S$GLB, | Performed by: FAMILY MEDICINE

## 2023-03-07 RX ORDER — HYDROCODONE BITARTRATE AND ACETAMINOPHEN 7.5; 325 MG/1; MG/1
1 TABLET ORAL EVERY 6 HOURS PRN
Qty: 21 TABLET | Refills: 0 | Status: SHIPPED | OUTPATIENT
Start: 2023-03-07 | End: 2023-11-17

## 2023-03-07 NOTE — PROGRESS NOTES
Chief Complaint:    Chief Complaint   Patient presents with    Arm Pain       History of Present Illness:  Complaining of right arm pain, says he heard a pop while trying to lift something heavy      ROS:  Review of Systems    Past Medical History:   Diagnosis Date    Arthritis     Brain aneurysm     Cancer 2012    PROSTATE    COPD (chronic obstructive pulmonary disease)     Headache(784.0)     Hypertension     ON MEDS       Social History:  Social History     Socioeconomic History    Marital status: Single   Occupational History    Occupation: retired/disabled   Tobacco Use    Smoking status: Every Day     Packs/day: 0.25     Years: 40.00     Pack years: 10.00     Types: Cigarettes    Smokeless tobacco: Never    Tobacco comments:     instructed not to smoke after midnight   Substance and Sexual Activity    Alcohol use: No     Alcohol/week: 0.0 standard drinks    Drug use: No    Sexual activity: Yes     Birth control/protection: None     Social Determinants of Health     Financial Resource Strain: High Risk    Difficulty of Paying Living Expenses: Very hard   Food Insecurity: Food Insecurity Present    Worried About Running Out of Food in the Last Year: Often true    Ran Out of Food in the Last Year: Sometimes true   Transportation Needs: Unmet Transportation Needs    Lack of Transportation (Medical): Yes    Lack of Transportation (Non-Medical): Yes   Physical Activity: Inactive    Days of Exercise per Week: 0 days    Minutes of Exercise per Session: 0 min   Stress: Stress Concern Present    Feeling of Stress : Very much   Social Connections: Moderately Isolated    Frequency of Communication with Friends and Family: More than three times a week    Frequency of Social Gatherings with Friends and Family: Once a week    Attends Anabaptist Services: 1 to 4 times per year    Active Member of Clubs or Organizations: No    Attends Club or Organization Meetings: Never    Marital Status:    Housing Stability: High  "Risk    Unable to Pay for Housing in the Last Year: Yes    Number of Places Lived in the Last Year: 1    Unstable Housing in the Last Year: No       Family History:   family history includes Aneurysm in his paternal uncle; Heart disease in his brother, father, mother, and sister.    Health Maintenance   Topic Date Due    Lipid Panel  12/30/2021    TETANUS VACCINE  02/20/2031    Hepatitis C Screening  Completed    Abdominal Aortic Aneurysm Screening  Completed       Physical Exam:    Vital Signs  Pulse: 94  SpO2: (!) 94 %  Pain Score: 10-Worst pain ever  Pain Loc: Arm  Height and Weight  Height: 5' 11" (180.3 cm)  Weight: 108.9 kg (240 lb 1.3 oz)  BSA (Calculated - sq m): 2.34 sq meters  BMI (Calculated): 33.5  Weight in (lb) to have BMI = 25: 178.9]    Body mass index is 33.48 kg/m².    Physical Exam  Biceps tear of the right with weakness of the right elbow flexion    Assessment:      ICD-10-CM ICD-9-CM   1. Biceps tendon tear  S46.219A 840.8   2. Cervical facet joint syndrome  M47.812 723.8         Plan:         Given arm sling  Hydrocodone for pain  Ref to ortho      Orders Placed This Encounter   Procedures    Ambulatory referral/consult to Orthopedics       Current Outpatient Medications   Medication Sig Dispense Refill    acetaminophen (TYLENOL) 650 MG TbSR Take 650 mg by mouth every 8 (eight) hours.      albuterol (PROVENTIL/VENTOLIN HFA) 90 mcg/actuation inhaler Inhale 2 puffs into the lungs every 6 (six) hours as needed.      albuterol (PROVENTIL/VENTOLIN HFA) 90 mcg/actuation inhaler Inhale 2 puffs into the lungs every 4 (four) hours as needed for Wheezing. 18 g 2    albuterol (PROVENTIL/VENTOLIN HFA) 90 mcg/actuation inhaler INHALE 2 PUFFS INTO THE LUNGS EVERY 4 TO 6 HOURS AS NEEDED FOR WHEEZING 17 g 3    albuterol-ipratropium (DUO-NEB) 2.5 mg-0.5 mg/3 mL nebulizer solution Take 3 mLs by nebulization every 6 (six) hours as needed for Wheezing. Rescue 75 mL 0    butalbital-aspirin-caffeine -40 mg Tab " Take by mouth every 4 (four) hours as needed.      clotrimazole (LOTRIMIN) 1 % cream Apply to left hand twice daily 60 g 1    fluticasone propionate (FLONASE) 50 mcg/actuation nasal spray SPRAY TWICE IN EACH NOSTRIL DAILY 16 g 5    omeprazole (PRILOSEC) 20 MG capsule Take 20 mg by mouth once daily.      topiramate (TOPAMAX) 100 MG tablet TAKE 1 TABLET(100 MG) BY MOUTH EVERY DAY 30 tablet 4    VENTOLIN HFA 90 mcg/actuation inhaler INHALE 2 PUFFS INTO THE LUNGS EVERY 4 TO 6 HOURS AS NEEDED FOR WHEEZING 18 g 0    amlodipine-benazepril 10-20mg (LOTREL) 10-20 mg per capsule Take 1 capsule by mouth once daily. 90 capsule 3    HYDROcodone-acetaminophen (NORCO) 7.5-325 mg per tablet Take 1 tablet by mouth every 6 (six) hours as needed for Pain. 21 tablet 0    rosuvastatin (CRESTOR) 20 MG tablet Take 1 tablet (20 mg total) by mouth once daily. 90 tablet 3     No current facility-administered medications for this visit.       Medications Discontinued During This Encounter   Medication Reason    HYDROcodone-acetaminophen (NORCO) 7.5-325 mg per tablet Reorder       No follow-ups on file.      Tori Rosenberg MD

## 2023-03-07 NOTE — TELEPHONE ENCOUNTER
----- Message from Teresa Garcia sent at 3/7/2023 11:00 AM CST -----  Contact: John  Type:  Same Day Appointment Request    Caller is requesting a same day appointment.  Caller declined first available appointment listed below.    Name of Caller: John  When is the first available appointment? 3/14/23  Symptoms: Arm injury/ arm is swollen  Best Call Back Number: [phone]  Additional Information: Patient wants to be seen today, nothing at [facility] till tomorrow

## 2023-04-03 ENCOUNTER — TELEPHONE (OUTPATIENT)
Dept: ORTHOPEDICS | Facility: CLINIC | Age: 68
End: 2023-04-03
Payer: MEDICARE

## 2023-04-03 DIAGNOSIS — M25.521 RIGHT ELBOW PAIN: Primary | ICD-10-CM

## 2023-04-03 NOTE — TELEPHONE ENCOUNTER
LVM confirming appt for Tues, April 11 @ 8:40 @ the [place] location with Dr. Jasper Weeks and asked that patient arrive 30 minutes prior to appt time for xray.

## 2023-11-17 ENCOUNTER — OFFICE VISIT (OUTPATIENT)
Dept: FAMILY MEDICINE | Facility: CLINIC | Age: 68
End: 2023-11-17
Payer: MEDICARE

## 2023-11-17 ENCOUNTER — TELEPHONE (OUTPATIENT)
Dept: FAMILY MEDICINE | Facility: CLINIC | Age: 68
End: 2023-11-17

## 2023-11-17 VITALS
WEIGHT: 240 LBS | HEIGHT: 71 IN | BODY MASS INDEX: 33.6 KG/M2 | OXYGEN SATURATION: 98 % | HEART RATE: 97 BPM | RESPIRATION RATE: 18 BRPM

## 2023-11-17 DIAGNOSIS — E78.5 HYPERLIPIDEMIA, UNSPECIFIED HYPERLIPIDEMIA TYPE: ICD-10-CM

## 2023-11-17 DIAGNOSIS — Z72.0 TOBACCO USE: ICD-10-CM

## 2023-11-17 DIAGNOSIS — I70.8 AORTO-ILIAC ATHEROSCLEROSIS: ICD-10-CM

## 2023-11-17 DIAGNOSIS — Z12.11 SCREENING FOR COLORECTAL CANCER: ICD-10-CM

## 2023-11-17 DIAGNOSIS — G89.4 CHRONIC PAIN SYNDROME: ICD-10-CM

## 2023-11-17 DIAGNOSIS — R97.20 ELEVATED PSA: ICD-10-CM

## 2023-11-17 DIAGNOSIS — I70.0 AORTO-ILIAC ATHEROSCLEROSIS: ICD-10-CM

## 2023-11-17 DIAGNOSIS — R26.9 ABNORMALITY OF GAIT AND MOBILITY: ICD-10-CM

## 2023-11-17 DIAGNOSIS — Z12.12 SCREENING FOR COLORECTAL CANCER: ICD-10-CM

## 2023-11-17 DIAGNOSIS — Z00.00 ENCOUNTER FOR PREVENTIVE HEALTH EXAMINATION: Primary | ICD-10-CM

## 2023-11-17 DIAGNOSIS — I10 PRIMARY HYPERTENSION: ICD-10-CM

## 2023-11-17 DIAGNOSIS — J44.9 CHRONIC OBSTRUCTIVE PULMONARY DISEASE, UNSPECIFIED COPD TYPE: ICD-10-CM

## 2023-11-17 PROCEDURE — G9919 PR SCREENING AND POSITIVE: ICD-10-PCS | Mod: HCNC,CPTII,S$GLB, | Performed by: NURSE PRACTITIONER

## 2023-11-17 PROCEDURE — 1125F AMNT PAIN NOTED PAIN PRSNT: CPT | Mod: HCNC,CPTII,S$GLB, | Performed by: NURSE PRACTITIONER

## 2023-11-17 PROCEDURE — G0439 PR MEDICARE ANNUAL WELLNESS SUBSEQUENT VISIT: ICD-10-PCS | Mod: HCNC,S$GLB,, | Performed by: NURSE PRACTITIONER

## 2023-11-17 PROCEDURE — 3288F FALL RISK ASSESSMENT DOCD: CPT | Mod: HCNC,CPTII,S$GLB, | Performed by: NURSE PRACTITIONER

## 2023-11-17 PROCEDURE — G9919 SCRN ND POS ND PROV OF REC: HCPCS | Mod: HCNC,CPTII,S$GLB, | Performed by: NURSE PRACTITIONER

## 2023-11-17 PROCEDURE — 1101F PT FALLS ASSESS-DOCD LE1/YR: CPT | Mod: HCNC,CPTII,S$GLB, | Performed by: NURSE PRACTITIONER

## 2023-11-17 PROCEDURE — 3288F PR FALLS RISK ASSESSMENT DOCUMENTED: ICD-10-PCS | Mod: HCNC,CPTII,S$GLB, | Performed by: NURSE PRACTITIONER

## 2023-11-17 PROCEDURE — 99999 PR PBB SHADOW E&M-EST. PATIENT-LVL III: CPT | Mod: PBBFAC,HCNC,, | Performed by: NURSE PRACTITIONER

## 2023-11-17 PROCEDURE — 1170F PR FUNCTIONAL STATUS ASSESSED: ICD-10-PCS | Mod: HCNC,CPTII,S$GLB, | Performed by: NURSE PRACTITIONER

## 2023-11-17 PROCEDURE — 1170F FXNL STATUS ASSESSED: CPT | Mod: HCNC,CPTII,S$GLB, | Performed by: NURSE PRACTITIONER

## 2023-11-17 PROCEDURE — G0439 PPPS, SUBSEQ VISIT: HCPCS | Mod: HCNC,S$GLB,, | Performed by: NURSE PRACTITIONER

## 2023-11-17 PROCEDURE — 1101F PR PT FALLS ASSESS DOC 0-1 FALLS W/OUT INJ PAST YR: ICD-10-PCS | Mod: HCNC,CPTII,S$GLB, | Performed by: NURSE PRACTITIONER

## 2023-11-17 PROCEDURE — 99999 PR PBB SHADOW E&M-EST. PATIENT-LVL III: ICD-10-PCS | Mod: PBBFAC,HCNC,, | Performed by: NURSE PRACTITIONER

## 2023-11-17 PROCEDURE — 1125F PR PAIN SEVERITY QUANTIFIED, PAIN PRESENT: ICD-10-PCS | Mod: HCNC,CPTII,S$GLB, | Performed by: NURSE PRACTITIONER

## 2023-11-17 RX ORDER — ALBUTEROL SULFATE 90 UG/1
2 AEROSOL, METERED RESPIRATORY (INHALATION)
Qty: 17 G | Refills: 0 | Status: SHIPPED | OUTPATIENT
Start: 2023-11-17

## 2023-11-17 RX ORDER — FLUTICASONE PROPIONATE 50 MCG
SPRAY, SUSPENSION (ML) NASAL
Qty: 16 G | Refills: 5 | Status: SHIPPED | OUTPATIENT
Start: 2023-11-17

## 2023-11-17 RX ORDER — AMLODIPINE AND BENAZEPRIL HYDROCHLORIDE 10; 20 MG/1; MG/1
1 CAPSULE ORAL DAILY
Qty: 30 CAPSULE | Refills: 0 | Status: SHIPPED | OUTPATIENT
Start: 2023-11-17 | End: 2023-12-26

## 2023-11-17 NOTE — TELEPHONE ENCOUNTER
----- Message from Sanya Bauer NP sent at 11/17/2023 10:55 AM CST -----  Regarding: appt  Please call him to schedule an annual physical with Dr Rosenberg to update his prescriptions and tests.  Early morning appt so he can do labs then too.      thanks

## 2023-11-17 NOTE — PATIENT INSTRUCTIONS
Counseling and Referral of Other Preventative  (Italic type indicates deductible and co-insurance are waived)    Patient Name: John Laguna  Today's Date: 11/17/2023    Health Maintenance       Date Due Completion Date    COVID-19 Vaccine (1) Never done ---    High Dose Statin Never done ---    Colorectal Cancer Screening Never done ---    Shingles Vaccine (1 of 2) Never done ---    RSV Vaccine (Age 60+) (1 - 1-dose 60+ series) Never done ---    Pneumococcal Vaccines (Age 65+) (3 - PPSV23 or PCV20) 05/06/2020 2/23/2016    Lipid Panel 12/30/2021 12/30/2020    Influenza Vaccine (1) 09/01/2023 8/28/2020    Hemoglobin A1c (Diabetic Prevention Screening) 12/30/2023 12/30/2020    TETANUS VACCINE 02/20/2031 2/20/2021        Orders Placed This Encounter   Procedures    Cologuard Screening (Multitarget Stool DNA)       The following information is provided to all patients.  This information is to help you find resources for any of the problems found today that may be affecting your health:                Living healthy guide: www.[facility].[place].gov      Understanding Diabetes: www.diabetes.org      Eating healthy: www.cdc.gov/healthyweight      CDC home safety checklist: www.cdc.gov/steadi/patient.html      Agency on Aging: www.goea.[place].[facility]      Alcoholics anonymous (AA): www.aa.org      Physical Activity: www.tesfaye.nih.gov/ig7eejl      Tobacco use: www.quitwithusla.org

## 2023-11-17 NOTE — PROGRESS NOTES
"  John Laguna presented for a  Medicare AWV and comprehensive Health Risk Assessment today. The following components were reviewed and updated:    Medical history  Family History  Social history  Allergies and Current Medications  Health Risk Assessment  Health Maintenance  Care Team     Patient screened moderate and/or high risk for one or more social determinants of health (SDOH). Patient connected to community resources through the ED Navigator.      ** See Completed Assessments for Annual Wellness Visit within the encounter summary.**         The following assessments were completed:  Living Situation  CAGE  Depression Screening  Timed Get Up and Go  Whisper Test  Cognitive Function Screening    Nutrition Screening  ADL Screening  PAQ Screening        Vitals:    11/17/23 0944   Pulse: 97   Resp: 18   SpO2: 98%   Weight: 108.8 kg (239 lb 15.5 oz)   Height: 5' 11" (1.803 m)     Body mass index is 33.47 kg/m².  Physical Exam  Constitutional:       General: He is not in acute distress.     Appearance: Normal appearance. He is well-developed. He is obese. He is not ill-appearing, toxic-appearing or diaphoretic.   HENT:      Head: Normocephalic and atraumatic.      Right Ear: External ear normal.      Left Ear: External ear normal.      Mouth/Throat:      Mouth: Mucous membranes are moist.   Eyes:      Conjunctiva/sclera: Conjunctivae normal.   Neck:      Vascular: No carotid bruit.   Cardiovascular:      Rate and Rhythm: Normal rate and regular rhythm.      Pulses: Normal pulses.      Heart sounds: Normal heart sounds. No murmur heard.     No friction rub. No gallop.   Pulmonary:      Effort: Pulmonary effort is normal. No respiratory distress.      Breath sounds: Rales present. No wheezing.      Comments: Right lobe, clears with cough, no wheezing at this time  Chest:      Chest wall: No tenderness.   Abdominal:      General: Bowel sounds are normal. There is no distension.      Palpations: There is no mass.      " Tenderness: There is no abdominal tenderness.      Hernia: No hernia is present.   Musculoskeletal:         General: No tenderness.      Cervical back: Normal range of motion and neck supple. No tenderness.      Comments: Limited ROM due to arthritis   Lymphadenopathy:      Cervical: No cervical adenopathy.   Skin:     General: Skin is warm and dry.   Neurological:      Mental Status: He is alert and oriented to person, place, and time.      Cranial Nerves: No cranial nerve deficit.   Psychiatric:         Mood and Affect: Mood normal.         Behavior: Behavior normal.               Diagnoses and health risks identified today and associated recommendations/orders:    1. Encounter for preventive health examination  Screenings performed, as noted above.  Personal preventative testing needs reviewed.      2. Screening for colorectal cancer  Due for screening, will send via mail  - Cologuard Screening (Multitarget Stool DNA); Future  - Cologuard Screening (Multitarget Stool DNA)    3. Aorto-iliac atherosclerosis  Monitored, stable, not taking a statin at this time    4. Abnormality of gait and mobility  Monitored, stable, no recent falls, hx of left knee replacement and bilateral shoulder replaced    5. Primary hypertension  Treated with amlodipine/benazepril, unstable, has been out of med, reordered for one month    6. Hyperlipidemia, unspecified hyperlipidemia type  Treated with statin, is not taking at this time    7. Elevated PSA  Monitored, needs to be re-evaluated, will call to schedule a physical     8. Tobacco use  Assessed, unstable, still smoking, enc to stop    9. Chronic obstructive pulmonary disease, unspecified COPD type  Treated with inhalers, stable, cont tx    10. Chronic pain syndrome  Treated with Tylenol, stable, follow up with pain mgmt as needed    Review for Substance Use Disorders: patient does not use substances per chart    Review for opioid screening: Patient is not prescribed opioids      Discussed due for physical and labs, he is in agreement, will have nurse call him to set up appt.  Declined shot today, still recovering from an URI      Provided John with a 5-10 year written screening schedule and personal prevention plan. Recommendations were developed using the USPSTF age appropriate recommendations. Education, counseling, and referrals were provided as needed. After Visit Summary printed and given to patient which includes a list of additional screenings\tests needed.    No follow-ups on file.    Sanya Bauer, TEREZA  I offered to discuss advanced care planning, including how to pick a person who would make decisions for you if you were unable to make them for yourself, called a health care power of , and what kind of decisions you might make such as use of life sustaining treatments such as ventilators and tube feeding when faced with a life limiting illness recorded on a living will that they will need to know. (How you want to be cared for as you near the end of your natural life)     X Patient is interested in learning more about how to make advanced directives.  I provided them paperwork and offered to discuss this with them.        .llclint

## 2023-12-13 DIAGNOSIS — I10 HYPERTENSION: ICD-10-CM

## 2023-12-26 RX ORDER — AMLODIPINE AND BENAZEPRIL HYDROCHLORIDE 10; 20 MG/1; MG/1
1 CAPSULE ORAL
Qty: 90 CAPSULE | Refills: 0 | Status: SHIPPED | OUTPATIENT
Start: 2023-12-26 | End: 2024-01-08 | Stop reason: SDUPTHER

## 2024-01-08 ENCOUNTER — LAB VISIT (OUTPATIENT)
Dept: LAB | Facility: HOSPITAL | Age: 69
End: 2024-01-08
Attending: FAMILY MEDICINE
Payer: MEDICARE

## 2024-01-08 ENCOUNTER — OFFICE VISIT (OUTPATIENT)
Dept: FAMILY MEDICINE | Facility: CLINIC | Age: 69
End: 2024-01-08
Payer: MEDICARE

## 2024-01-08 VITALS
OXYGEN SATURATION: 96 % | HEIGHT: 71 IN | WEIGHT: 240.63 LBS | BODY MASS INDEX: 33.69 KG/M2 | SYSTOLIC BLOOD PRESSURE: 140 MMHG | DIASTOLIC BLOOD PRESSURE: 90 MMHG | HEART RATE: 77 BPM

## 2024-01-08 DIAGNOSIS — Z12.2 ENCOUNTER FOR SCREENING FOR LUNG CANCER: ICD-10-CM

## 2024-01-08 DIAGNOSIS — L02.92 BOIL: ICD-10-CM

## 2024-01-08 DIAGNOSIS — I10 PRIMARY HYPERTENSION: ICD-10-CM

## 2024-01-08 DIAGNOSIS — E78.5 HYPERLIPIDEMIA, UNSPECIFIED HYPERLIPIDEMIA TYPE: ICD-10-CM

## 2024-01-08 DIAGNOSIS — Z12.5 PROSTATE CANCER SCREENING ENCOUNTER, OPTIONS AND RISKS DISCUSSED: ICD-10-CM

## 2024-01-08 DIAGNOSIS — M06.9 RHEUMATOID ARTHRITIS, INVOLVING UNSPECIFIED SITE, UNSPECIFIED WHETHER RHEUMATOID FACTOR PRESENT: Primary | ICD-10-CM

## 2024-01-08 DIAGNOSIS — J44.9 CHRONIC OBSTRUCTIVE PULMONARY DISEASE, UNSPECIFIED COPD TYPE: ICD-10-CM

## 2024-01-08 LAB
ALBUMIN SERPL BCP-MCNC: 3.8 G/DL (ref 3.5–5.2)
ALP SERPL-CCNC: 112 U/L (ref 55–135)
ALT SERPL W/O P-5'-P-CCNC: 45 U/L (ref 10–44)
ANION GAP SERPL CALC-SCNC: 12 MMOL/L (ref 8–16)
AST SERPL-CCNC: 36 U/L (ref 10–40)
BASOPHILS # BLD AUTO: 0.11 K/UL (ref 0–0.2)
BASOPHILS NFR BLD: 1.8 % (ref 0–1.9)
BILIRUB SERPL-MCNC: 0.5 MG/DL (ref 0.1–1)
BUN SERPL-MCNC: 10 MG/DL (ref 8–23)
CALCIUM SERPL-MCNC: 9.1 MG/DL (ref 8.7–10.5)
CHLORIDE SERPL-SCNC: 102 MMOL/L (ref 95–110)
CHOLEST SERPL-MCNC: 166 MG/DL (ref 120–199)
CHOLEST/HDLC SERPL: 2.9 {RATIO} (ref 2–5)
CO2 SERPL-SCNC: 25 MMOL/L (ref 23–29)
CREAT SERPL-MCNC: 0.7 MG/DL (ref 0.5–1.4)
DIFFERENTIAL METHOD BLD: ABNORMAL
EOSINOPHIL # BLD AUTO: 0.1 K/UL (ref 0–0.5)
EOSINOPHIL NFR BLD: 1.4 % (ref 0–8)
ERYTHROCYTE [DISTWIDTH] IN BLOOD BY AUTOMATED COUNT: 13 % (ref 11.5–14.5)
EST. GFR  (NO RACE VARIABLE): >60 ML/MIN/1.73 M^2
ESTIMATED AVG GLUCOSE: 100 MG/DL (ref 68–131)
GLUCOSE SERPL-MCNC: 81 MG/DL (ref 70–110)
HBA1C MFR BLD: 5.1 % (ref 4–5.6)
HCT VFR BLD AUTO: 51.2 % (ref 40–54)
HDLC SERPL-MCNC: 58 MG/DL (ref 40–75)
HDLC SERPL: 34.9 % (ref 20–50)
HGB BLD-MCNC: 17.2 G/DL (ref 14–18)
IMM GRANULOCYTES # BLD AUTO: 0.03 K/UL (ref 0–0.04)
IMM GRANULOCYTES NFR BLD AUTO: 0.5 % (ref 0–0.5)
LDLC SERPL CALC-MCNC: 90.4 MG/DL (ref 63–159)
LYMPHOCYTES # BLD AUTO: 1.4 K/UL (ref 1–4.8)
LYMPHOCYTES NFR BLD: 22.2 % (ref 18–48)
MCH RBC QN AUTO: 33.1 PG (ref 27–31)
MCHC RBC AUTO-ENTMCNC: 33.6 G/DL (ref 32–36)
MCV RBC AUTO: 99 FL (ref 82–98)
MONOCYTES # BLD AUTO: 0.6 K/UL (ref 0.3–1)
MONOCYTES NFR BLD: 9.6 % (ref 4–15)
NEUTROPHILS # BLD AUTO: 4 K/UL (ref 1.8–7.7)
NEUTROPHILS NFR BLD: 64.5 % (ref 38–73)
NONHDLC SERPL-MCNC: 108 MG/DL
NRBC BLD-RTO: 0 /100 WBC
PLATELET # BLD AUTO: 257 K/UL (ref 150–450)
PMV BLD AUTO: 10.9 FL (ref 9.2–12.9)
POTASSIUM SERPL-SCNC: 3.8 MMOL/L (ref 3.5–5.1)
PROT SERPL-MCNC: 7.8 G/DL (ref 6–8.4)
RBC # BLD AUTO: 5.19 M/UL (ref 4.6–6.2)
SODIUM SERPL-SCNC: 139 MMOL/L (ref 136–145)
TRIGL SERPL-MCNC: 88 MG/DL (ref 30–150)
WBC # BLD AUTO: 6.26 K/UL (ref 3.9–12.7)

## 2024-01-08 PROCEDURE — 3008F BODY MASS INDEX DOCD: CPT | Mod: HCNC,CPTII,S$GLB, | Performed by: FAMILY MEDICINE

## 2024-01-08 PROCEDURE — 99999 PR PBB SHADOW E&M-EST. PATIENT-LVL IV: CPT | Mod: PBBFAC,HCNC,, | Performed by: FAMILY MEDICINE

## 2024-01-08 PROCEDURE — 1160F RVW MEDS BY RX/DR IN RCRD: CPT | Mod: HCNC,CPTII,S$GLB, | Performed by: FAMILY MEDICINE

## 2024-01-08 PROCEDURE — 1125F AMNT PAIN NOTED PAIN PRSNT: CPT | Mod: HCNC,CPTII,S$GLB, | Performed by: FAMILY MEDICINE

## 2024-01-08 PROCEDURE — 1159F MED LIST DOCD IN RCRD: CPT | Mod: HCNC,CPTII,S$GLB, | Performed by: FAMILY MEDICINE

## 2024-01-08 PROCEDURE — 3077F SYST BP >= 140 MM HG: CPT | Mod: HCNC,CPTII,S$GLB, | Performed by: FAMILY MEDICINE

## 2024-01-08 PROCEDURE — 85025 COMPLETE CBC W/AUTO DIFF WBC: CPT | Mod: HCNC | Performed by: FAMILY MEDICINE

## 2024-01-08 PROCEDURE — 3288F FALL RISK ASSESSMENT DOCD: CPT | Mod: HCNC,CPTII,S$GLB, | Performed by: FAMILY MEDICINE

## 2024-01-08 PROCEDURE — 36415 COLL VENOUS BLD VENIPUNCTURE: CPT | Mod: HCNC,PO | Performed by: FAMILY MEDICINE

## 2024-01-08 PROCEDURE — 84153 ASSAY OF PSA TOTAL: CPT | Mod: HCNC | Performed by: FAMILY MEDICINE

## 2024-01-08 PROCEDURE — 1101F PT FALLS ASSESS-DOCD LE1/YR: CPT | Mod: HCNC,CPTII,S$GLB, | Performed by: FAMILY MEDICINE

## 2024-01-08 PROCEDURE — 4010F ACE/ARB THERAPY RXD/TAKEN: CPT | Mod: HCNC,CPTII,S$GLB, | Performed by: FAMILY MEDICINE

## 2024-01-08 PROCEDURE — 80053 COMPREHEN METABOLIC PANEL: CPT | Mod: HCNC | Performed by: FAMILY MEDICINE

## 2024-01-08 PROCEDURE — 83036 HEMOGLOBIN GLYCOSYLATED A1C: CPT | Mod: DBM,HCNC | Performed by: FAMILY MEDICINE

## 2024-01-08 PROCEDURE — 3080F DIAST BP >= 90 MM HG: CPT | Mod: HCNC,CPTII,S$GLB, | Performed by: FAMILY MEDICINE

## 2024-01-08 PROCEDURE — 80061 LIPID PANEL: CPT | Mod: HCNC | Performed by: FAMILY MEDICINE

## 2024-01-08 PROCEDURE — 99214 OFFICE O/P EST MOD 30 MIN: CPT | Mod: HCNC,S$GLB,, | Performed by: FAMILY MEDICINE

## 2024-01-08 RX ORDER — METOPROLOL SUCCINATE 25 MG/1
25 TABLET, EXTENDED RELEASE ORAL DAILY
Qty: 30 TABLET | Refills: 11 | Status: SHIPPED | OUTPATIENT
Start: 2024-01-08 | End: 2025-01-07

## 2024-01-08 RX ORDER — AMOXICILLIN AND CLAVULANATE POTASSIUM 875; 125 MG/1; MG/1
1 TABLET, FILM COATED ORAL EVERY 12 HOURS
Qty: 14 TABLET | Refills: 0 | Status: SHIPPED | OUTPATIENT
Start: 2024-01-08 | End: 2024-01-15

## 2024-01-08 RX ORDER — AMLODIPINE AND BENAZEPRIL HYDROCHLORIDE 10; 20 MG/1; MG/1
1 CAPSULE ORAL DAILY
Qty: 90 CAPSULE | Refills: 0 | Status: SHIPPED | OUTPATIENT
Start: 2024-01-08

## 2024-01-08 NOTE — PROGRESS NOTES
Chief Complaint:    Chief Complaint   Patient presents with    Annual Exam       History of Present Illness:  Patient with COPD, HTN, HLD, GERD, and tobacco use presents today for an annual follow up    Doing well, compliant with medications.     Does not check blood pressure at home, does say he take medication daily. Blood pressure today elevated.     RA to hands, has not been to rheumatologist, says he was receiving shots but they were not helping. Also has some neck pain with any movement.    Boil to left side of posterior head.     Will have 1-2 beers daily, no hard liquor. Smoker for 50+ years with no desire to quit, used to smoke 2-3 ppd for majority but has not cut back to 1 pack every 3 days. Due for lung cancer screening.     Due for labs.     ROS:  Review of Systems   Constitutional:  Negative for appetite change, chills and fever.   HENT:  Negative for congestion, ear pain, postnasal drip, rhinorrhea, sinus pressure and sinus pain.    Eyes:  Negative for pain.   Respiratory:  Negative for cough, chest tightness and shortness of breath.    Cardiovascular:  Negative for chest pain and palpitations.   Gastrointestinal:  Negative for abdominal pain, blood in stool, constipation, diarrhea and nausea.   Genitourinary:  Negative for difficulty urinating, dysuria, flank pain and hematuria.   Musculoskeletal:  Positive for arthralgias and neck pain. Negative for myalgias.   Skin:  Negative for pallor and wound.        (+) boil   Neurological:  Negative for dizziness, tremors, speech difficulty, light-headedness and headaches.   Psychiatric/Behavioral:  Negative for behavioral problems, dysphoric mood and sleep disturbance. The patient is not nervous/anxious.    All other systems reviewed and are negative.      Past Medical History:   Diagnosis Date    Arthritis     Brain aneurysm     Cancer 2012    PROSTATE    COPD (chronic obstructive pulmonary disease)     Headache(784.0)     Hypertension     ON MEDS        Social History:  Social History     Socioeconomic History    Marital status: Single   Occupational History    Occupation: retired/disabled   Tobacco Use    Smoking status: Every Day     Current packs/day: 0.25     Average packs/day: 0.3 packs/day for 40.0 years (10.0 ttl pk-yrs)     Types: Cigarettes    Smokeless tobacco: Never    Tobacco comments:     instructed not to smoke after midnight   Substance and Sexual Activity    Alcohol use: No     Alcohol/week: 0.0 standard drinks of alcohol    Drug use: No    Sexual activity: Yes     Birth control/protection: None     Social Determinants of Health     Financial Resource Strain: Low Risk  (11/17/2023)    Overall Financial Resource Strain (CARDIA)     Difficulty of Paying Living Expenses: Not hard at all   Food Insecurity: No Food Insecurity (11/17/2023)    Hunger Vital Sign     Worried About Running Out of Food in the Last Year: Never true     Ran Out of Food in the Last Year: Never true   Transportation Needs: No Transportation Needs (11/17/2023)    PRAPARE - Transportation     Lack of Transportation (Medical): No     Lack of Transportation (Non-Medical): No   Physical Activity: Inactive (11/17/2023)    Exercise Vital Sign     Days of Exercise per Week: 0 days     Minutes of Exercise per Session: 0 min   Stress: No Stress Concern Present (11/17/2023)    [employer] - Occupational Stress Questionnaire     Feeling of Stress : Not at all   Social Connections: Socially Isolated (11/17/2023)    Social Connection and Isolation Panel [NHANES]     Frequency of Communication with Friends and Family: Three times a week     Frequency of Social Gatherings with Friends and Family: Three times a week     Attends Nondenominational Services: Never     Active Member of Clubs or Organizations: No     Attends Club or Organization Meetings: Never     Marital Status:    Housing Stability: Low Risk  (11/17/2023)    Housing Stability Vital Sign     Unable  "to Pay for Housing in the Last Year: No     Number of Places Lived in the Last Year: 1     Unstable Housing in the Last Year: No       Family History:   family history includes Aneurysm in his paternal uncle; Heart disease in his brother, father, mother, and sister.    Health Maintenance   Topic Date Due    High Dose Statin  Never done    Colorectal Cancer Screening  Never done    Shingles Vaccine (1 of 2) Never done    Lipid Panel  12/30/2021    TETANUS VACCINE  02/20/2031    Hepatitis C Screening  Completed    Abdominal Aortic Aneurysm Screening  Completed       Physical Exam:    Vital Signs  Pulse: 77  SpO2: 96 %  BP: (!) 140/90  BP Location: Right arm  Patient Position: Sitting  Pain Score: 10-Worst pain ever  Pain Loc: Neck  Height and Weight  Height: 5' 11" (180.3 cm)  Weight: 109.1 kg (240 lb 10.1 oz)  BSA (Calculated - sq m): 2.34 sq meters  BMI (Calculated): 33.6  Weight in (lb) to have BMI = 25: 178.9]    Body mass index is 33.56 kg/m².    Physical Exam  Constitutional:       Appearance: Normal appearance.   HENT:      Head: Normocephalic and atraumatic.      Right Ear: Tympanic membrane normal.      Left Ear: Tympanic membrane normal.   Eyes:      Extraocular Movements: Extraocular movements intact.      Pupils: Pupils are equal, round, and reactive to light.   Cardiovascular:      Rate and Rhythm: Normal rate and regular rhythm.      Pulses: Normal pulses.      Heart sounds: Normal heart sounds. No murmur heard.     No gallop.   Pulmonary:      Effort: Pulmonary effort is normal. No respiratory distress.      Breath sounds: Normal breath sounds. No wheezing, rhonchi or rales.   Abdominal:      General: There is no distension.      Palpations: Abdomen is soft.      Tenderness: There is no abdominal tenderness.   Musculoskeletal:         General: No swelling, deformity or signs of injury. Normal range of motion.      Cervical back: Normal range of motion.   Skin:     General: Skin is warm and dry.      " Capillary Refill: Capillary refill takes less than 2 seconds.      Coloration: Skin is not jaundiced or pale.      Findings: Lesion present.          Neurological:      General: No focal deficit present.      Mental Status: He is alert and oriented to person, place, and time.   Psychiatric:         Mood and Affect: Mood normal.         Behavior: Behavior normal.         Lab Results   Component Value Date    CHOL 224 (H) 12/30/2020    CHOL 139 05/24/2017    CHOL 177 05/03/2016    TRIG 75 12/30/2020    TRIG 143 05/24/2017    TRIG 106 05/03/2016    HDL 75 12/30/2020    HDL 45 05/24/2017    HDL 65 05/03/2016    TOTALCHOLEST 3.0 12/30/2020    TOTALCHOLEST 3.1 05/24/2017    TOTALCHOLEST 2.7 05/03/2016    NONHDLCHOL 149 12/30/2020    NONHDLCHOL 94 05/24/2017    NONHDLCHOL 112 05/03/2016       Lab Results   Component Value Date    HGBA1C 5.3 12/30/2020       Assessment:      ICD-10-CM ICD-9-CM   1. Rheumatoid arthritis, involving unspecified site, unspecified whether rheumatoid factor present  M06.9 714.0   2. Boil  L02.92 680.9   3. Primary hypertension  I10 401.9   4. Chronic obstructive pulmonary disease, unspecified COPD type  J44.9 496   5. Hyperlipidemia, unspecified hyperlipidemia type  E78.5 272.4   6. Prostate cancer screening encounter, options and risks discussed  Z12.5 V76.44   7. Encounter for screening for lung cancer  Z12.2 V76.0       Plan:    Start Augmentin for boil to back of head along with warm compress. Return in a few days to drain and clean.     Start Metoprolol 25 mg, continue Lotrel 10-20 mg. Monitor blood pressure 2 times per day. Check two hours after taking medication. Keep below 139/89.     Refer to rheumatology for RA.  Order low dose CT lung scan for cancer screening.   See labs below. Check PSA.    Orders Placed This Encounter   Procedures    CT Chest Lung Screening Low Dose    CBC Auto Differential    Comprehensive Metabolic Panel    Hemoglobin A1C    Lipid Panel    PSA, Screening     Ambulatory referral/consult to Rheumatology       Current Outpatient Medications   Medication Sig Dispense Refill    acetaminophen (TYLENOL) 650 MG TbSR Take 650 mg by mouth every 8 (eight) hours.      albuterol (PROVENTIL/VENTOLIN HFA) 90 mcg/actuation inhaler Inhale 2 puffs into the lungs every 6 (six) hours as needed.      albuterol (PROVENTIL/VENTOLIN HFA) 90 mcg/actuation inhaler Inhale 2 puffs into the lungs every 4 to 6 hours as needed for Wheezing. 17 g 0    clotrimazole (LOTRIMIN) 1 % cream Apply to left hand twice daily 60 g 1    fluticasone propionate (FLONASE) 50 mcg/actuation nasal spray SPRAY TWICE IN EACH NOSTRIL DAILY 16 g 5    VENTOLIN HFA 90 mcg/actuation inhaler INHALE 2 PUFFS INTO THE LUNGS EVERY 4 TO 6 HOURS AS NEEDED FOR WHEEZING 18 g 0    albuterol-ipratropium (DUO-NEB) 2.5 mg-0.5 mg/3 mL nebulizer solution Take 3 mLs by nebulization every 6 (six) hours as needed for Wheezing. Rescue 75 mL 0    amlodipine-benazepril 10-20mg (LOTREL) 10-20 mg per capsule Take 1 capsule by mouth once daily. 90 capsule 0    amoxicillin-clavulanate 875-125mg (AUGMENTIN) 875-125 mg per tablet Take 1 tablet by mouth every 12 (twelve) hours. for 7 days 14 tablet 0    metoprolol succinate (TOPROL-XL) 25 MG 24 hr tablet Take 1 tablet (25 mg total) by mouth once daily. 30 tablet 11    rosuvastatin (CRESTOR) 20 MG tablet Take 1 tablet (20 mg total) by mouth once daily. 90 tablet 3     No current facility-administered medications for this visit.       Medications Discontinued During This Encounter   Medication Reason    amlodipine-benazepril 10-20mg (LOTREL) 10-20 mg per capsule Reorder       Follow up in about 2 days (around 1/10/2024).      Tori Rosenberg MD  Scribe Attestation:   IEmmett, am scribing for, and in the presence of, Dr.Arif Rosenberg I performed the above scribed service and the documentation accurately describes the services I performed. I attest to the accuracy of the note.    I, Dr. Tori Rosenberg,  reviewed documentation as scribed above. I performed the services described in this documentation.  I agree that the record reflects my personal performance and is accurate and complete. Tori Rosenberg MD.  10/04/2021

## 2024-01-09 ENCOUNTER — TELEPHONE (OUTPATIENT)
Dept: FAMILY MEDICINE | Facility: CLINIC | Age: 69
End: 2024-01-09
Payer: MEDICARE

## 2024-01-09 DIAGNOSIS — R97.20 ELEVATED PSA: Primary | ICD-10-CM

## 2024-01-09 DIAGNOSIS — R74.8 ELEVATED LIVER ENZYMES: ICD-10-CM

## 2024-01-09 LAB — COMPLEXED PSA SERPL-MCNC: 7.3 NG/ML (ref 0–4)

## 2024-01-11 ENCOUNTER — OFFICE VISIT (OUTPATIENT)
Dept: FAMILY MEDICINE | Facility: CLINIC | Age: 69
End: 2024-01-11
Payer: MEDICARE

## 2024-01-11 VITALS
OXYGEN SATURATION: 98 % | HEART RATE: 87 BPM | WEIGHT: 241.5 LBS | BODY MASS INDEX: 33.68 KG/M2 | SYSTOLIC BLOOD PRESSURE: 138 MMHG | DIASTOLIC BLOOD PRESSURE: 78 MMHG

## 2024-01-11 DIAGNOSIS — I10 PRIMARY HYPERTENSION: Primary | ICD-10-CM

## 2024-01-11 PROBLEM — I70.0 AORTO-ILIAC ATHEROSCLEROSIS: Status: RESOLVED | Noted: 2022-03-29 | Resolved: 2024-01-11

## 2024-01-11 PROBLEM — I70.8 AORTO-ILIAC ATHEROSCLEROSIS: Status: RESOLVED | Noted: 2022-03-29 | Resolved: 2024-01-11

## 2024-01-11 PROCEDURE — 1159F MED LIST DOCD IN RCRD: CPT | Mod: HCNC,CPTII,S$GLB, | Performed by: FAMILY MEDICINE

## 2024-01-11 PROCEDURE — 3078F DIAST BP <80 MM HG: CPT | Mod: HCNC,CPTII,S$GLB, | Performed by: FAMILY MEDICINE

## 2024-01-11 PROCEDURE — 3008F BODY MASS INDEX DOCD: CPT | Mod: HCNC,CPTII,S$GLB, | Performed by: FAMILY MEDICINE

## 2024-01-11 PROCEDURE — 3288F FALL RISK ASSESSMENT DOCD: CPT | Mod: HCNC,CPTII,S$GLB, | Performed by: FAMILY MEDICINE

## 2024-01-11 PROCEDURE — 1125F AMNT PAIN NOTED PAIN PRSNT: CPT | Mod: HCNC,CPTII,S$GLB, | Performed by: FAMILY MEDICINE

## 2024-01-11 PROCEDURE — 4010F ACE/ARB THERAPY RXD/TAKEN: CPT | Mod: HCNC,CPTII,S$GLB, | Performed by: FAMILY MEDICINE

## 2024-01-11 PROCEDURE — 99213 OFFICE O/P EST LOW 20 MIN: CPT | Mod: HCNC,S$GLB,, | Performed by: FAMILY MEDICINE

## 2024-01-11 PROCEDURE — 1101F PT FALLS ASSESS-DOCD LE1/YR: CPT | Mod: HCNC,CPTII,S$GLB, | Performed by: FAMILY MEDICINE

## 2024-01-11 PROCEDURE — 3075F SYST BP GE 130 - 139MM HG: CPT | Mod: HCNC,CPTII,S$GLB, | Performed by: FAMILY MEDICINE

## 2024-01-11 PROCEDURE — 99999 PR PBB SHADOW E&M-EST. PATIENT-LVL III: CPT | Mod: PBBFAC,HCNC,, | Performed by: FAMILY MEDICINE

## 2024-01-11 PROCEDURE — 3044F HG A1C LEVEL LT 7.0%: CPT | Mod: HCNC,CPTII,S$GLB, | Performed by: FAMILY MEDICINE

## 2024-01-11 NOTE — PROGRESS NOTES
Chief Complaint:    Chief Complaint   Patient presents with    Follow-up       History of Present Illness:  01/11/2024:-  Presents today for blood pressure follow up,    Blood pressure stable since starting metoprolol.    Boil to head has improved greatly on abx and warm compress.     ALT elevation.  PSA 7.3, has had elevation in past which he had TURP procedure to which he says did not reveal any cancer. Does have Urology appt set up.     Is unable to complete CT lung low dose and rheumatology appt due to transportation problems.       01/08/2024:-  Patient with COPD, HTN, HLD, GERD, and tobacco use presents today for an annual follow up    Doing well, compliant with medications.     Does not check blood pressure at home, does say he take medication daily. Blood pressure today elevated.     RA to hands, has not been to rheumatologist, says he was receiving shots but they were not helping. Also has some neck pain with any movement.    Boil to left side of posterior head.     Will have 1-2 beers daily, no hard liquor. Smoker for 50+ years with no desire to quit, used to smoke 2-3 ppd for majority but has not cut back to 1 pack every 3 days. Due for lung cancer screening.     Due for labs.     ROS:  Review of Systems   Constitutional:  Negative for appetite change, chills and fever.   HENT:  Negative for congestion, ear pain, postnasal drip, rhinorrhea, sinus pressure and sinus pain.    Eyes:  Negative for pain.   Respiratory:  Negative for cough, chest tightness and shortness of breath.    Cardiovascular:  Negative for chest pain and palpitations.   Gastrointestinal:  Negative for abdominal pain, blood in stool, constipation, diarrhea and nausea.   Genitourinary:  Negative for difficulty urinating, dysuria, flank pain and hematuria.   Musculoskeletal:  Negative for arthralgias and myalgias.   Skin:  Negative for pallor and wound.   Neurological:  Negative for dizziness, tremors, speech difficulty, light-headedness  and headaches.   Psychiatric/Behavioral:  Negative for behavioral problems, dysphoric mood and sleep disturbance. The patient is not nervous/anxious.    All other systems reviewed and are negative.      Past Medical History:   Diagnosis Date    Arthritis     Brain aneurysm     Cancer 2012    PROSTATE    COPD (chronic obstructive pulmonary disease)     Headache(784.0)     Hypertension     ON MEDS       Social History:  Social History     Socioeconomic History    Marital status: Single   Occupational History    Occupation: retired/disabled   Tobacco Use    Smoking status: Every Day     Current packs/day: 0.25     Average packs/day: 0.3 packs/day for 40.0 years (10.0 ttl pk-yrs)     Types: Cigarettes    Smokeless tobacco: Never    Tobacco comments:     instructed not to smoke after midnight   Substance and Sexual Activity    Alcohol use: No     Alcohol/week: 0.0 standard drinks of alcohol    Drug use: No    Sexual activity: Yes     Birth control/protection: None     Social Determinants of Health     Financial Resource Strain: Low Risk  (11/17/2023)    Overall Financial Resource Strain (CARDIA)     Difficulty of Paying Living Expenses: Not hard at all   Food Insecurity: No Food Insecurity (11/17/2023)    Hunger Vital Sign     Worried About Running Out of Food in the Last Year: Never true     Ran Out of Food in the Last Year: Never true   Transportation Needs: No Transportation Needs (11/17/2023)    PRAPARE - Transportation     Lack of Transportation (Medical): No     Lack of Transportation (Non-Medical): No   Physical Activity: Inactive (11/17/2023)    Exercise Vital Sign     Days of Exercise per Week: 0 days     Minutes of Exercise per Session: 0 min   Stress: No Stress Concern Present (11/17/2023)    [employer] - Occupational Stress Questionnaire     Feeling of Stress : Not at all   Social Connections: Socially Isolated (11/17/2023)    Social Connection and Isolation Panel [NHANES]      Frequency of Communication with Friends and Family: Three times a week     Frequency of Social Gatherings with Friends and Family: Three times a week     Attends Yazidism Services: Never     Active Member of Clubs or Organizations: No     Attends Club or Organization Meetings: Never     Marital Status:    Housing Stability: Low Risk  (11/17/2023)    Housing Stability Vital Sign     Unable to Pay for Housing in the Last Year: No     Number of Places Lived in the Last Year: 1     Unstable Housing in the Last Year: No       Family History:   family history includes Aneurysm in his paternal uncle; Heart disease in his brother, father, mother, and sister.    Health Maintenance   Topic Date Due    High Dose Statin  Never done    Colorectal Cancer Screening  Never done    Shingles Vaccine (1 of 2) Never done    Lipid Panel  01/08/2025    TETANUS VACCINE  02/20/2031    Hepatitis C Screening  Completed    Abdominal Aortic Aneurysm Screening  Completed       Physical Exam:    Vital Signs  Pulse: 87  SpO2: 98 %  BP: 138/78  BP Location: Left arm  Patient Position: Sitting  Pain Score:   3  Height and Weight  Weight: 109.5 kg (241 lb 8.2 oz)]    Body mass index is 33.68 kg/m².    Physical Exam  Constitutional:       Appearance: Normal appearance.   HENT:      Head: Normocephalic and atraumatic.      Right Ear: Tympanic membrane normal.      Left Ear: Tympanic membrane normal.   Eyes:      Extraocular Movements: Extraocular movements intact.      Pupils: Pupils are equal, round, and reactive to light.   Cardiovascular:      Rate and Rhythm: Normal rate and regular rhythm.      Pulses: Normal pulses.      Heart sounds: Normal heart sounds. No murmur heard.     No gallop.   Pulmonary:      Effort: Pulmonary effort is normal. No respiratory distress.      Breath sounds: Normal breath sounds. No wheezing, rhonchi or rales.   Abdominal:      General: There is no distension.      Palpations: Abdomen is soft.      Tenderness:  There is no abdominal tenderness.   Musculoskeletal:         General: No swelling, deformity or signs of injury. Normal range of motion.      Cervical back: Normal range of motion.   Skin:     General: Skin is warm and dry.      Capillary Refill: Capillary refill takes less than 2 seconds.      Coloration: Skin is not jaundiced or pale.   Neurological:      General: No focal deficit present.      Mental Status: He is alert and oriented to person, place, and time.   Psychiatric:         Mood and Affect: Mood normal.         Behavior: Behavior normal.         Lab Results   Component Value Date    CHOL 166 01/08/2024    CHOL 224 (H) 12/30/2020    CHOL 139 05/24/2017    TRIG 88 01/08/2024    TRIG 75 12/30/2020    TRIG 143 05/24/2017    HDL 58 01/08/2024    HDL 75 12/30/2020    HDL 45 05/24/2017    TOTALCHOLEST 2.9 01/08/2024    TOTALCHOLEST 3.0 12/30/2020    TOTALCHOLEST 3.1 05/24/2017    NONHDLCHOL 108 01/08/2024    NONHDLCHOL 149 12/30/2020    NONHDLCHOL 94 05/24/2017       Lab Results   Component Value Date    HGBA1C 5.1 01/08/2024       Assessment:      ICD-10-CM ICD-9-CM   1. Primary hypertension  I10 401.9         Plan:    Continue monitoring blood pressure. Keep below 139/89.   Complete urology appt for elevated PSA.      No orders of the defined types were placed in this encounter.      Current Outpatient Medications   Medication Sig Dispense Refill    acetaminophen (TYLENOL) 650 MG TbSR Take 650 mg by mouth every 8 (eight) hours.      albuterol (PROVENTIL/VENTOLIN HFA) 90 mcg/actuation inhaler Inhale 2 puffs into the lungs every 6 (six) hours as needed.      albuterol (PROVENTIL/VENTOLIN HFA) 90 mcg/actuation inhaler Inhale 2 puffs into the lungs every 4 to 6 hours as needed for Wheezing. 17 g 0    amlodipine-benazepril 10-20mg (LOTREL) 10-20 mg per capsule Take 1 capsule by mouth once daily. 90 capsule 0    amoxicillin-clavulanate 875-125mg (AUGMENTIN) 875-125 mg per tablet Take 1 tablet by mouth every 12  (twelve) hours. for 7 days 14 tablet 0    clotrimazole (LOTRIMIN) 1 % cream Apply to left hand twice daily 60 g 1    fluticasone propionate (FLONASE) 50 mcg/actuation nasal spray SPRAY TWICE IN EACH NOSTRIL DAILY 16 g 5    metoprolol succinate (TOPROL-XL) 25 MG 24 hr tablet Take 1 tablet (25 mg total) by mouth once daily. 30 tablet 11    VENTOLIN HFA 90 mcg/actuation inhaler INHALE 2 PUFFS INTO THE LUNGS EVERY 4 TO 6 HOURS AS NEEDED FOR WHEEZING 18 g 0    albuterol-ipratropium (DUO-NEB) 2.5 mg-0.5 mg/3 mL nebulizer solution Take 3 mLs by nebulization every 6 (six) hours as needed for Wheezing. Rescue 75 mL 0    rosuvastatin (CRESTOR) 20 MG tablet Take 1 tablet (20 mg total) by mouth once daily. 90 tablet 3     No current facility-administered medications for this visit.       There are no discontinued medications.      No follow-ups on file.      Tori Rosenberg MD  Scribe Attestation:   I, Emmett Aceves, am scribing for, and in the presence of, Dr.Arif Rosenberg I performed the above scribed service and the documentation accurately describes the services I performed. I attest to the accuracy of the note.    I, Dr. Tori Rosenberg, reviewed documentation as scribed above. I performed the services described in this documentation.  I agree that the record reflects my personal performance and is accurate and complete. Tori Rosenberg MD.  10/04/2021

## 2024-08-16 ENCOUNTER — PATIENT OUTREACH (OUTPATIENT)
Dept: ADMINISTRATIVE | Facility: HOSPITAL | Age: 69
End: 2024-08-16
Payer: MEDICARE

## 2024-08-16 NOTE — PROGRESS NOTES
VBHM Score: 1     Colon Cancer Screening    Pneumonia Vaccine  Shingles/Zoster Vaccine  RSV Vaccine              Health Maintenance Topic(s) Outreach Outcomes & Actions Taken:    Colorectal Cancer Screening - Outreach Outcomes & Actions Taken  : cologuard ordered 11/2023, no results found         Additional Notes:  Attempted to contact the patient to discuss overdue colon screening, no answer, no voicemail.              Care Management, Digital Medicine, and/or Education Referrals    OPCM Risk Score: 37.5         Next Steps - Referral Actions: no referrals        Additional Notes:  SDOH reviewed 11/2023

## 2024-09-27 ENCOUNTER — PATIENT OUTREACH (OUTPATIENT)
Dept: ADMINISTRATIVE | Facility: HOSPITAL | Age: 69
End: 2024-09-27
Payer: MEDICARE

## 2024-11-04 ENCOUNTER — TELEPHONE (OUTPATIENT)
Dept: FAMILY MEDICINE | Facility: CLINIC | Age: 69
End: 2024-11-04

## 2024-11-04 ENCOUNTER — OFFICE VISIT (OUTPATIENT)
Dept: FAMILY MEDICINE | Facility: CLINIC | Age: 69
End: 2024-11-04
Payer: MEDICARE

## 2024-11-04 VITALS
HEIGHT: 71 IN | RESPIRATION RATE: 18 BRPM | HEART RATE: 98 BPM | WEIGHT: 238.75 LBS | BODY MASS INDEX: 33.43 KG/M2 | OXYGEN SATURATION: 93 %

## 2024-11-04 DIAGNOSIS — E78.5 HYPERLIPIDEMIA, UNSPECIFIED HYPERLIPIDEMIA TYPE: ICD-10-CM

## 2024-11-04 DIAGNOSIS — M06.9 RHEUMATOID ARTHRITIS, INVOLVING UNSPECIFIED SITE, UNSPECIFIED WHETHER RHEUMATOID FACTOR PRESENT: ICD-10-CM

## 2024-11-04 DIAGNOSIS — K21.9 GASTROESOPHAGEAL REFLUX DISEASE, UNSPECIFIED WHETHER ESOPHAGITIS PRESENT: ICD-10-CM

## 2024-11-04 DIAGNOSIS — J44.9 CHRONIC OBSTRUCTIVE PULMONARY DISEASE, UNSPECIFIED COPD TYPE: ICD-10-CM

## 2024-11-04 DIAGNOSIS — E66.811 OBESITY (BMI 30.0-34.9): ICD-10-CM

## 2024-11-04 DIAGNOSIS — G89.4 CHRONIC PAIN SYNDROME: ICD-10-CM

## 2024-11-04 DIAGNOSIS — Z72.0 TOBACCO USE: ICD-10-CM

## 2024-11-04 DIAGNOSIS — Z00.00 ENCOUNTER FOR PREVENTIVE HEALTH EXAMINATION: Primary | ICD-10-CM

## 2024-11-04 DIAGNOSIS — R26.9 ABNORMALITY OF GAIT AND MOBILITY: ICD-10-CM

## 2024-11-04 DIAGNOSIS — R97.20 ELEVATED PSA: ICD-10-CM

## 2024-11-04 DIAGNOSIS — I10 PRIMARY HYPERTENSION: ICD-10-CM

## 2024-11-04 PROCEDURE — 3044F HG A1C LEVEL LT 7.0%: CPT | Mod: HCNC,CPTII,S$GLB, | Performed by: NURSE PRACTITIONER

## 2024-11-04 PROCEDURE — G0439 PPPS, SUBSEQ VISIT: HCPCS | Mod: HCNC,S$GLB,, | Performed by: NURSE PRACTITIONER

## 2024-11-04 PROCEDURE — 1125F AMNT PAIN NOTED PAIN PRSNT: CPT | Mod: HCNC,CPTII,S$GLB, | Performed by: NURSE PRACTITIONER

## 2024-11-04 PROCEDURE — 3288F FALL RISK ASSESSMENT DOCD: CPT | Mod: HCNC,CPTII,S$GLB, | Performed by: NURSE PRACTITIONER

## 2024-11-04 PROCEDURE — 4010F ACE/ARB THERAPY RXD/TAKEN: CPT | Mod: HCNC,CPTII,S$GLB, | Performed by: NURSE PRACTITIONER

## 2024-11-04 PROCEDURE — G9919 SCRN ND POS ND PROV OF REC: HCPCS | Mod: HCNC,CPTII,S$GLB, | Performed by: NURSE PRACTITIONER

## 2024-11-04 PROCEDURE — 1170F FXNL STATUS ASSESSED: CPT | Mod: HCNC,CPTII,S$GLB, | Performed by: NURSE PRACTITIONER

## 2024-11-04 PROCEDURE — 1158F ADVNC CARE PLAN TLK DOCD: CPT | Mod: HCNC,CPTII,S$GLB, | Performed by: NURSE PRACTITIONER

## 2024-11-04 PROCEDURE — 1160F RVW MEDS BY RX/DR IN RCRD: CPT | Mod: HCNC,CPTII,S$GLB, | Performed by: NURSE PRACTITIONER

## 2024-11-04 PROCEDURE — 99999 PR PBB SHADOW E&M-EST. PATIENT-LVL IV: CPT | Mod: PBBFAC,HCNC,, | Performed by: NURSE PRACTITIONER

## 2024-11-04 PROCEDURE — 1101F PT FALLS ASSESS-DOCD LE1/YR: CPT | Mod: HCNC,CPTII,S$GLB, | Performed by: NURSE PRACTITIONER

## 2024-11-04 PROCEDURE — 1159F MED LIST DOCD IN RCRD: CPT | Mod: HCNC,CPTII,S$GLB, | Performed by: NURSE PRACTITIONER

## 2024-11-04 NOTE — PATIENT INSTRUCTIONS
Counseling and Referral of Other Preventative  (Italic type indicates deductible and co-insurance are waived)    Patient Name: John Laguna  Today's Date: 11/4/2024    Health Maintenance       Date Due Completion Date    High Dose Statin Never done ---    Colorectal Cancer Screening Never done ---    Shingles Vaccine (1 of 2) Never done ---    RSV Vaccine (Age 60+ and Pregnant patients) (1 - Risk 60-74 years 1-dose series) Never done ---    Pneumococcal Vaccines (Age 65+) (3 of 3 - PPSV23 or PCV20) 05/06/2020 2/23/2016    Influenza Vaccine (1) 09/01/2024 8/28/2020    COVID-19 Vaccine (1 - 2024-25 season) Never done ---    Lipid Panel 01/08/2025 1/8/2024    Hemoglobin A1c (Diabetic Prevention Screening) 01/08/2027 1/8/2024    TETANUS VACCINE 02/20/2031 2/20/2021        No orders of the defined types were placed in this encounter.      The following information is provided to all patients.  This information is to help you find resources for any of the problems found today that may be affecting your health:                  Living healthy guide: www.[facility].[place].gov      Understanding Diabetes: www.diabetes.org      Eating healthy: www.cdc.gov/healthyweight      CDC home safety checklist: www.cdc.gov/steadi/patient.html      Agency on Aging: www.goea.[place].[facility]      Alcoholics anonymous (AA): www.aa.org      Physical Activity: www.tesfaye.nih.gov/pa7whly      Tobacco use: www.quitwithusla.org         
Breathing spontaneous and unlabored. Breath sounds clear and equal bilaterally with regular rhythm.

## 2024-11-04 NOTE — PROGRESS NOTES
"  John Laguna presented for a  Medicare AWV and comprehensive Health Risk Assessment today. The following components were reviewed and updated:    Medical history  Family History  Social history  Allergies and Current Medications  Health Risk Assessment  Health Maintenance  Care Team     Patient screened moderate and/or high risk for one or more social determinants of health (SDOH). Patient connected to community resources through the ED Navigator.      ** See Completed Assessments for Annual Wellness Visit within the encounter summary.**         The following assessments were completed:  Living Situation  CAGE  Depression Screening  Timed Get Up and Go  Whisper Test  Cognitive Function Screening    Nutrition Screening  ADL Screening  PAQ Screening  Has urine leakage ever interrupted your daily activites or sleep? No  Do you think you could use some help to better manage urine leakage?No     Opioid documentation:      Patient does not have a current opioid prescription.        Vitals:    11/04/24 0800   Pulse: 98   Resp: 18   SpO2: (!) 93%   Weight: 108.3 kg (238 lb 12.1 oz)   Height: 5' 11" (1.803 m)     Body mass index is 33.3 kg/m².  Physical Exam  Constitutional:       General: He is not in acute distress.     Appearance: Normal appearance. He is well-developed. He is obese. He is not ill-appearing, toxic-appearing or diaphoretic.   HENT:      Head: Normocephalic and atraumatic.      Right Ear: External ear normal.      Left Ear: External ear normal.      Mouth/Throat:      Mouth: Mucous membranes are moist.   Eyes:      Conjunctiva/sclera: Conjunctivae normal.   Neck:      Vascular: No carotid bruit.      Comments: Guarded neck mobility  Cardiovascular:      Rate and Rhythm: Normal rate and regular rhythm.      Heart sounds: Normal heart sounds. No murmur heard.     No friction rub. No gallop.   Pulmonary:      Effort: Pulmonary effort is normal. No respiratory distress.      Breath sounds: Normal breath " sounds. No wheezing or rales.   Chest:      Chest wall: No tenderness.   Abdominal:      General: Bowel sounds are normal.      Palpations: Abdomen is soft.   Musculoskeletal:         General: No tenderness.   Skin:     General: Skin is warm and dry.   Neurological:      Mental Status: He is alert and oriented to person, place, and time.      Cranial Nerves: No cranial nerve deficit.   Psychiatric:         Mood and Affect: Mood normal.         Behavior: Behavior normal.               Diagnoses and health risks identified today and associated recommendations/orders:    1. Encounter for preventive health examination  Screenings performed, as noted above.  Personal preventative testing needs reviewed.      2. Abnormality of gait and mobility  Monitored, stable, has difficulty moving around due to RA    3. Primary hypertension  Treated, unstable, not consistently taking his BP meds, discussed this with him    4. Hyperlipidemia, unspecified hyperlipidemia type  Treated with Crestor in the past, has not taken in years, last check normal    5. Gastroesophageal reflux disease, unspecified whether esophagitis present  Monitored, stable, avoids foot triggers, declines meds for this    6. Chronic pain syndrome  Monitored, unstable, in chronic pain due to RA, declines going to rheumatology, follow up with pcp    7. Chronic obstructive pulmonary disease, unspecified COPD type  Treated with albuterol prn, stable, continues to smoke, no desire to quit at this time, follow up with pcp    8. Elevated PSA  Monitored, unstable, last check 7.3, no-showed or cancelled appt with urology, follow up with pcp    9. Obesity (BMI 30.0-34.9)  Monitored, unstable, heart healthy diet, exercise to tolerance    10. Rheumatoid arthritis, involving unspecified site, unspecified whether rheumatoid factor present  Treated with Tylenol as needed, NSAIDs hurt his stomach, declines seeing rheumatology at this tome    11. Tobacco use  Assessed, unstable,  no desire to quit at this time    Discussed vaccines, declined flu shot today, will set up for labs and a follow up with his pcp, declines LDCT at this time, declines cologurad also      Provided John with a 5-10 year written screening schedule and personal prevention plan. Recommendations were developed using the USPSTF age appropriate recommendations. Education, counseling, and referrals were provided as needed. After Visit Summary printed and given to patient which includes a list of additional screenings\tests needed.    No follow-ups on file.    Sanya Bauer, TEREZA  I offered to discuss advanced care planning, including how to pick a person who would make decisions for you if you were unable to make them for yourself, called a health care power of , and what kind of decisions you might make such as use of life sustaining treatments such as ventilators and tube feeding when faced with a life limiting illness recorded on a living will that they will need to know. (How you want to be cared for as you near the end of your natural life)     X Patient is interested in learning more about how to make advanced directives.  I provided them paperwork and offered to discuss this with them.

## 2024-11-04 NOTE — TELEPHONE ENCOUNTER
----- Message from Sanya Bauer NP sent at 11/4/2024  8:44 AM CST -----  Regarding: appt  Please call him to schedule a follow up visit with Dr MEEK to discuss elevated PSA and transaminiases.    thanks

## 2024-11-10 ENCOUNTER — HOSPITAL ENCOUNTER (EMERGENCY)
Facility: HOSPITAL | Age: 69
Discharge: HOME | End: 2024-11-10
Attending: EMERGENCY MEDICINE
Payer: MEDICARE

## 2024-11-10 VITALS
TEMPERATURE: 98 F | DIASTOLIC BLOOD PRESSURE: 82 MMHG | HEART RATE: 80 BPM | SYSTOLIC BLOOD PRESSURE: 146 MMHG | RESPIRATION RATE: 18 BRPM | OXYGEN SATURATION: 95 %

## 2024-11-10 DIAGNOSIS — M25.512 LEFT SHOULDER PAIN: ICD-10-CM

## 2024-11-10 DIAGNOSIS — S46.002A ROTATOR CUFF INJURY, LEFT, INITIAL ENCOUNTER: Primary | ICD-10-CM

## 2024-11-10 PROCEDURE — 63600175 PHARM REV CODE 636 W HCPCS: Mod: HCNC | Performed by: EMERGENCY MEDICINE

## 2024-11-10 PROCEDURE — 96372 THER/PROPH/DIAG INJ SC/IM: CPT | Performed by: EMERGENCY MEDICINE

## 2024-11-10 PROCEDURE — 99284 EMERGENCY DEPT VISIT MOD MDM: CPT | Mod: 25,HCNC

## 2024-11-10 PROCEDURE — 25000003 PHARM REV CODE 250: Mod: HCNC | Performed by: EMERGENCY MEDICINE

## 2024-11-10 RX ORDER — ONDANSETRON 4 MG/1
4 TABLET, ORALLY DISINTEGRATING ORAL
Status: COMPLETED | OUTPATIENT
Start: 2024-11-10 | End: 2024-11-10

## 2024-11-10 RX ORDER — HYDROMORPHONE HYDROCHLORIDE 1 MG/ML
1 INJECTION, SOLUTION INTRAMUSCULAR; INTRAVENOUS; SUBCUTANEOUS
Status: COMPLETED | OUTPATIENT
Start: 2024-11-10 | End: 2024-11-10

## 2024-11-10 RX ORDER — HYDROCODONE BITARTRATE AND ACETAMINOPHEN 10; 325 MG/1; MG/1
1 TABLET ORAL EVERY 6 HOURS PRN
Qty: 12 TABLET | Refills: 0 | Status: SHIPPED | OUTPATIENT
Start: 2024-11-10

## 2024-11-10 RX ADMIN — HYDROMORPHONE HYDROCHLORIDE 1 MG: 1 INJECTION, SOLUTION INTRAMUSCULAR; INTRAVENOUS; SUBCUTANEOUS at 01:11

## 2024-11-10 RX ADMIN — ONDANSETRON 4 MG: 4 TABLET, ORALLY DISINTEGRATING ORAL at 01:11

## 2024-11-10 NOTE — DISCHARGE INSTRUCTIONS
Clinically have a rotator cuff injury of the left shoulder.  Use a sling for comfort but get out of the sling at least 4 times daily and do range motion exercises.  He was ibuprofen for pain and Norco for breakthrough pain.  Follow up with the [facility] at next available return as needed for any worsening symptoms, problems, questions or concerns.

## 2024-11-10 NOTE — ED PROVIDER NOTES
SCRIBE #1 NOTE: I, Marcellus Ames, am scribing for, and in the presence of, Rolly Delaney Jr., MD. I have scribed the entire note.       History     Chief Complaint   Patient presents with    Shoulder Injury     Lt shoulder injury today when trying to lift something      Review of patient's allergies indicates:   Allergen Reactions    Morphine      Other reaction(s): tongue swelling  Other reaction(s): Hives         History of Present Illness     HPI    11/10/2024, 1:18 PM  History obtained from the patient      History of Present Illness: John Laguna is a 69 y.o. male patient who presents to the Emergency Department for evaluation of a shoulder injury which onset today. Pt was lifting a heavy object when he heard a noise and his shoulder began to hurt. Pt is a smoker. No further complaints or concerns at this time.  Patient was has a history of bilateral rotator cuff tears as well as a biceps tendon rupture on the right.  He was right-hand dominant.  PATIENT WAS WITH A HISTORY OF BICEPS TENDON RUPTURE ON RIGHT AS WELL AS BILATERAL ROTATOR CUFF INJURIES      Arrival mode: EMS    PCP: Tori Rosenberg MD        Past Medical History:  Past Medical History:   Diagnosis Date    Arthritis     Brain aneurysm     Cancer 2012    PROSTATE    COPD (chronic obstructive pulmonary disease)     Headache(784.0)     Headache, migraine, intractable 2013    Hypertension     ON MEDS       Past Surgical History:  Past Surgical History:   Procedure Laterality Date    BRAIN SURGERY  2012    AV shunt    CATARACT EXTRACTION, BILATERAL Bilateral     JOINT REPLACEMENT  2001    left knee    PROSTATE SURGERY      ROTATOR CUFF REPAIR  2003         Family History:  Family History   Problem Relation Name Age of Onset    Heart disease Mother      Heart disease Father      Heart disease Sister      Heart disease Brother 4-     Aneurysm Paternal Uncle         Social History:  Social History     Tobacco  Use    Smoking status: Every Day     Current packs/day: 0.25     Average packs/day: 0.3 packs/day for 40.0 years (10.0 ttl pk-yrs)     Types: Cigarettes    Smokeless tobacco: Never    Tobacco comments:     instructed not to smoke after midnight   Substance and Sexual Activity    Alcohol use: No     Alcohol/week: 0.0 standard drinks of alcohol    Drug use: No    Sexual activity: Yes     Birth control/protection: None        Review of Systems     Review of Systems   Constitutional:  Negative for chills, diaphoresis and fever.   HENT:  Negative for congestion.    Respiratory:  Negative for cough and shortness of breath.    Cardiovascular:  Negative for chest pain.   Gastrointestinal:  Negative for abdominal pain, diarrhea, nausea and vomiting.   Genitourinary:  Negative for dysuria.   Musculoskeletal:  Positive for arthralgias (R shoulder). Negative for back pain.   Skin:  Negative for rash.   Neurological:  Negative for dizziness, weakness and headaches.   All other systems reviewed and are negative.       Physical Exam     Initial Vitals [11/10/24 1300]   BP Pulse Resp Temp SpO2   (!) 140/82 86 18 97.5 °F (36.4 °C) 95 %      MAP       --          Physical Exam  Nursing Notes and Vital Signs Reviewed.  Constitutional: Patient is in no acute distress. Well-developed and well-nourished.  Head: Atraumatic. Normocephalic.  Eyes:  EOM intact.  No scleral icterus.  ENT: Mucous membranes are moist.  Nares clear   Neck:  Full ROM. No JVD.  Cardiovascular: Regular rate. Regular rhythm No murmurs, rubs, or gallops. Distal pulses are 2+ and symmetric normal cap refill and pulses in the left arm  Musculoskeletal:  Left arm in sling.  He was tenderness over the lateral aspect of the left deltoid.  Biceps tendon appears to be intact.  Biceps and normal position.  He was pain with any movement of the left shoulder as well as left elbow.  Skin: Warm and dry.  Neurological:  Alert, awake, and appropriate.  Normal speech.  No acute  focal neurological deficits are appreciated.  Two through 12 intact bilaterally.  Left median radial ulnar musculocutaneous axillary nerves intact on exam  Psychiatric: Normal affect. Good eye contact. Appropriate in content.       ED Course   Procedures  ED Vital Signs:  Vitals:    11/10/24 1300 11/10/24 1330   BP: (!) 140/82    Pulse: 86    Resp: 18 18   Temp: 97.5 °F (36.4 °C)    TempSrc: Oral    SpO2: 95%        Abnormal Lab Results:  Labs Reviewed   HIV 1 / 2 ANTIBODY   HEPATITIS C ANTIBODY   HEP C VIRUS HOLD SPECIMEN        All Lab Results:  None        Imaging Results:  Imaging Results              X-Ray Shoulder Trauma Left (Final result)  Result time 11/10/24 14:27:02      Final result by Krunal Montana MD (11/10/24 14:27:02)                   Impression:      As above.      Electronically signed by: Krunal Montana  Date:    11/10/2024  Time:    14:27               Narrative:    EXAMINATION:  XR SHOULDER TRAUMA 3 VIEW LEFT    CLINICAL HISTORY:  Pain in left shoulder    TECHNIQUE:  Three views of the left shoulder were performed.    COMPARISON  None    FINDINGS:  Suboptimal positioning.  No definite acute displaced fracture.  High position of the humeral head which can be seen with rotator cuff injury.  Moderate degenerative changes.                                                The Emergency Provider reviewed the vital signs and test results, which are outlined above.     ED Discussion     3:00 PM: Reassessed pt at this time. Discussed with pt all pertinent ED information and results. Discussed pt dx and plan of tx. Gave pt all f/u and return to the ED instructions. All questions and concerns were addressed at this time. Pt expresses understanding of information and instructions, and is comfortable with plan to discharge. Pt is stable for discharge.    I discussed with patient and/or family/caretaker that evaluation in the ED does not suggest any emergent or life threatening medical conditions requiring  immediate intervention beyond what was provided in the ED, and I believe patient is safe for discharge.  Regardless, an unremarkable evaluation in the ED does not preclude the development or presence of a serious of life threatening condition. As such, patient was instructed to return immediately for any worsening or change in current symptoms.         Medical Decision Making  Differential diagnosis:  Shoulder dislocation, shoulder fracture, left arm pain, rotator cuff injury    Patient was evaluated with the history and physical examination.  He was left arm pain status post fall.  This is in the shoulder.  Patient was worse with movement.  He was in his sling on arrival in his neurovascularly intact.  X-rays show patient was be a rotator cuff injury.  It was by sepsis and a normal position.  He has been placed in his sling with recommendations for range motion exercises 4 times daily.  He was provided with pain control has been referred to the ortho trauma clinic for re-evaluation.  Patient verbalized understanding agreement with the plan of care seems reliable.  Stable safe for discharge in my opinion    Amount and/or Complexity of Data Reviewed  Radiology: ordered. Decision-making details documented in ED Course.     Details: Rotator cuff injury left.  No acute fracture    Risk  OTC drugs.  Prescription drug management.  Parenteral controlled substances.  Decision regarding hospitalization.                ED Medication(s):  Medications   ondansetron disintegrating tablet 4 mg (4 mg Oral Given 11/10/24 1330)   HYDROmorphone injection 1 mg (1 mg Intramuscular Given 11/10/24 1330)       New Prescriptions    HYDROCODONE-ACETAMINOPHEN (NORCO)  MG PER TABLET    Take 1 tablet by mouth every 6 (six) hours as needed.        Follow-up Information       Tori Rosenberg MD.    Specialty: Family Medicine  Contact information:  [address] [address] [address] [phone]               Clinic, [facility]    Contact information:  [address] [place] [address] Dr Augustine Gretel [place] [id number]  [phone]                                 Scribe Attestation:   Scribe #1: I performed the above scribed service and the documentation accurately describes the services I performed. I attest to the accuracy of the note.     Attending:   Physician Attestation Statement for Scribe #1: I, Rolly Delaney Jr., MD, personally performed the services described in this documentation, as scribed by Marcellus Ames, in my presence, and it is both accurate and complete.           Clinical Impression       ICD-10-CM ICD-9-CM   1. Rotator cuff injury, left, initial encounter  S46.002A 959.2   2. Left shoulder pain  M25.512 719.41       Disposition:   Disposition: Discharged  Condition: Stable         Rolly Delaney Jr., MD  11/10/24 3466

## 2024-11-11 ENCOUNTER — PATIENT OUTREACH (OUTPATIENT)
Dept: EMERGENCY MEDICINE | Facility: HOSPITAL | Age: 69
End: 2024-11-11
Payer: MEDICARE

## 2024-11-12 ENCOUNTER — TELEPHONE (OUTPATIENT)
Dept: ORTHOPEDICS | Facility: CLINIC | Age: 69
End: 2024-11-12
Payer: MEDICARE

## 2024-11-12 NOTE — TELEPHONE ENCOUNTER
----- Message from Jori Wadsworth PA-C sent at 11/12/2024 11:52 AM CST -----  Regarding: RE: ER Referral 11/10  Recommend they see Macey as they likely need to discuss rotator cuff repair or shoulder replacement  ----- Message -----  From: Adelia Villagomez MA  Sent: 11/11/2024   9:44 AM CST  To: Swapna Grewal MA; Jori Wadsworth PA-C; #  Subject: FW: ER Referral 11/10                            When should patient be seen in clinic?  ----- Message -----  From: Bharti Franklin  Sent: 11/11/2024   8:29 AM CST  To: [facility]  Subject: ER Referral 11/10                                Good Morning    Rotator cuff injury, left,

## 2024-11-12 NOTE — TELEPHONE ENCOUNTER
Spoke with patient and scheduled his ER follow up appointment with Dr Choudhury for his shoulder. Patient verbalized understanding of appointment date, time and location.

## 2024-11-13 NOTE — PROGRESS NOTES
Pt visited the ED on 11/10/24. I made 2 attempts to reach patient to assist with scheduling a post ED 7-day follow up with PCP. I left a voicemail. Closing Encounter.    Ann Lundy

## 2024-11-19 ENCOUNTER — OFFICE VISIT (OUTPATIENT)
Dept: ORTHOPEDICS | Facility: CLINIC | Age: 69
End: 2024-11-19
Payer: MEDICARE

## 2024-11-19 ENCOUNTER — HOSPITAL ENCOUNTER (OUTPATIENT)
Dept: RADIOLOGY | Facility: HOSPITAL | Age: 69
Discharge: HOME | End: 2024-11-19
Attending: PHYSICIAN ASSISTANT
Payer: MEDICARE

## 2024-11-19 VITALS
WEIGHT: 238.75 LBS | SYSTOLIC BLOOD PRESSURE: 172 MMHG | HEIGHT: 71 IN | BODY MASS INDEX: 33.43 KG/M2 | HEART RATE: 89 BPM | DIASTOLIC BLOOD PRESSURE: 98 MMHG

## 2024-11-19 DIAGNOSIS — S46.002A ROTATOR CUFF INJURY, LEFT, INITIAL ENCOUNTER: Primary | ICD-10-CM

## 2024-11-19 DIAGNOSIS — S46.002A ROTATOR CUFF INJURY, LEFT, INITIAL ENCOUNTER: ICD-10-CM

## 2024-11-19 PROCEDURE — 99999 PR PBB SHADOW E&M-EST. PATIENT-LVL IV: CPT | Mod: PBBFAC,HCNC,, | Performed by: PHYSICIAN ASSISTANT

## 2024-11-19 PROCEDURE — 73030 X-RAY EXAM OF SHOULDER: CPT | Mod: TC,HCNC,LT

## 2024-11-19 PROCEDURE — 73030 X-RAY EXAM OF SHOULDER: CPT | Mod: 26,HCNC,LT, | Performed by: RADIOLOGY

## 2024-11-19 RX ORDER — HYDROCODONE BITARTRATE AND ACETAMINOPHEN 10; 325 MG/1; MG/1
1 TABLET ORAL EVERY 8 HOURS PRN
Qty: 21 TABLET | Refills: 0 | Status: SHIPPED | OUTPATIENT
Start: 2024-11-19 | End: 2024-11-26

## 2024-11-19 NOTE — PROGRESS NOTES
Subjective:      Patient ID: John Laguna is a 69 y.o. male.    History of Present Illness    CHIEF COMPLAINT:  - John presents today for initial evaluation of right shoulder pain after picking up a bucket, with history of previous bilateral rotator cuff repairs.    HPI:  John presents with right shoulder pain. He reports having had previous surgeries on both shoulders, including a rotator cuff repair on the right shoulder in 2000 and a left shoulder surgery in 2014. He also mentions a left knee replacement in 2000. He recently experienced a new injury to his right shoulder when he picked up a bucket and felt something. He describes the pain as localized to a specific region of his shoulder. He reports being able to lift his arm in front of him, but with pain.    He mentions having chronic numbness and tingling in his fingers. He also reports having severe arthritis. He expresses concern about potential treatment options, particularly regarding shoulder replacement. He lives alone, as his wife has passed away.    He visited the ER 10 days ago for this issue and was prescribed Norco, which he reports provided good pain relief. He mentions arriving at the clinic early in the morning, around 7 AM, due to transportation constraints.    He denies any new numbness or tingling in the fingers. He denies having a pacemaker or defibrillator.    SURGICAL HISTORY:  - Rotator cuff repair left shoulder: 2014  - Distal biceps tendon repair left arm  - Total knee replacement: 2000  - Possible rotator cuff repair right shoulder    SOCIAL HISTORY:  - Marital Status:  (wife passed away, patient lives alone)        Past Medical History:   Diagnosis Date    Arthritis     Brain aneurysm     Cancer 01/01/2012    PROSTATE    COPD (chronic obstructive pulmonary disease)     Headache(784.0)     Headache, migraine, intractable 07/16/2013    Hypertension     ON MEDS     Current Outpatient Medications:     acetaminophen (TYLENOL)  "650 MG TbSR, Take 650 mg by mouth every 8 (eight) hours., Disp: , Rfl:     albuterol (PROVENTIL/VENTOLIN HFA) 90 mcg/actuation inhaler, Inhale 2 puffs into the lungs every 4 to 6 hours as needed for Wheezing., Disp: 17 g, Rfl: 0    amlodipine-benazepril 10-20mg (LOTREL) 10-20 mg per capsule, Take 1 capsule by mouth once daily., Disp: 90 capsule, Rfl: 0    fluticasone propionate (FLONASE) 50 mcg/actuation nasal spray, SPRAY TWICE IN EACH NOSTRIL DAILY, Disp: 16 g, Rfl: 5    metoprolol succinate (TOPROL-XL) 25 MG 24 hr tablet, Take 1 tablet (25 mg total) by mouth once daily., Disp: 30 tablet, Rfl: 11    HYDROcodone-acetaminophen (NORCO)  mg per tablet, Take 1 tablet by mouth every 8 (eight) hours as needed for Pain., Disp: 21 tablet, Rfl: 0    Review of patient's allergies indicates:   Allergen Reactions    Morphine      Other reaction(s): tongue swelling  Other reaction(s): Hives       BP (!) 172/98 (BP Location: Right arm, Patient Position: Sitting)   Pulse 89   Ht 5' 11" (1.803 m)   Wt 108.3 kg (238 lb 12.1 oz)   BMI 33.30 kg/m²       Objective:    Ortho Exam   Left shoulder:   Skin is intact   No edema   Moderate TTP diffusely   ROM is greatly reduced in both forward flexion and abduction secondary to pain and weakness   Elbow, wrist, and fingers ROM normal   Forearm and compartments are soft and compressible   Motor exam normal   Sensation and pulses intact  Cap refill brisk    GEN: Well developed, well nourished male. AAOX3. No acute distress.   Head: Normocephalic, atraumatic.   Eyes: RADHA  Neck: Trachea is midline, no adenopathy  Resp: Breathing unlabored.  Neuro: Motor function normal, Cranial nerves intact  Psych: Mood and affect appropriate.    Assessment:     Imaging:  X-ray left shoulder obtained today was reviewed and shows no acute fracture or dislocation.  Superior migration of the humeral head is noted, likely indicative of rotator cuff tear.  There are also degenerative changes of the " glenohumeral and AC joints.      1. Rotator cuff injury, left, initial encounter        Plan:     - Reviewed the radiographs with the patient and explained the superior migration of the humeral head and how this is typically seen with massive rotator cuff tear.  - Recommend MRI of the left shoulder to further evaluate rotator cuff.    - Discussed the possibility of a shoulder replacement surgery, but patient expressed concerns about recovery given his living situation.  - Prescribed Norco refill for shoulder pain.  - Schedule follow-up visit a few days after the MRI to discuss results and treatment options.       Orders Placed This Encounter    MRI Shoulder Without Contrast Left    HYDROcodone-acetaminophen (NORCO)  mg per tablet     Follow up for MRI results.      Patient note was created using MModal Dictation.  Any errors in syntax or even information may not have been identified and edited on initial review prior to signing this note.    This note was generated with the assistance of ambient listening technology. Verbal consent was obtained by the patient and accompanying visitor(s) for the recording of patient appointment to facilitate this note. I attest to having reviewed and edited the generated note for accuracy, though some syntax or spelling errors may persist. Please contact the author of this note for any clarification.

## 2024-11-27 ENCOUNTER — TELEPHONE (OUTPATIENT)
Dept: ORTHOPEDICS | Facility: CLINIC | Age: 69
End: 2024-11-27
Payer: MEDICARE
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
## 2025-01-14 DIAGNOSIS — Z00.00 ENCOUNTER FOR MEDICARE ANNUAL WELLNESS EXAM: ICD-10-CM

## 2025-02-19 ENCOUNTER — PATIENT OUTREACH (OUTPATIENT)
Dept: ADMINISTRATIVE | Facility: HOSPITAL | Age: 70
End: 2025-02-19
Payer: MEDICARE

## 2025-02-19 NOTE — PROGRESS NOTES
Attempted to contact the patient to discuss/schedule overdue HM screenings, no answer, no voicemail.

## 2025-03-10 RX ORDER — ALBUTEROL SULFATE 90 UG/1
2 INHALANT RESPIRATORY (INHALATION)
Qty: 17 G | Refills: 0 | Status: SHIPPED | OUTPATIENT
Start: 2025-03-10

## 2025-03-10 RX ORDER — FLUTICASONE PROPIONATE 50 MCG
2 SPRAY, SUSPENSION (ML) NASAL
Qty: 16 G | Refills: 5 | Status: SHIPPED | OUTPATIENT
Start: 2025-03-10

## 2025-03-10 NOTE — TELEPHONE ENCOUNTER
Care Due:                  Date            Visit Type   Department     Provider  --------------------------------------------------------------------------------                                EP -                              PRIMARY      [facility]  Last Visit: 01-      CARE ([facility])   MEDICINE       Tori Rosenberg  Next Visit: None Scheduled  None         None Found                                                            Last  Test          Frequency    Reason                     Performed    Due Date  --------------------------------------------------------------------------------    Office Visit  15 months..  amlodipine-benazepril,     01- 04-                             metoprolol...............    CMP.........  12 months..  amlodipine-benazepril....  01- 01-    [facility] Embedded Care Due Messages. Reference number: [id number].   3/10/2025 10:16:43 AM CDT

## 2025-03-13 ENCOUNTER — PATIENT OUTREACH (OUTPATIENT)
Dept: ADMINISTRATIVE | Facility: HOSPITAL | Age: 70
End: 2025-03-13
Payer: MEDICARE

## 2025-03-19 DIAGNOSIS — I10 HYPERTENSION: ICD-10-CM

## 2025-05-15 ENCOUNTER — PATIENT OUTREACH (OUTPATIENT)
Dept: ADMINISTRATIVE | Facility: HOSPITAL | Age: 70
End: 2025-05-15
Payer: MEDICARE

## 2025-05-15 DIAGNOSIS — I10 PRIMARY HYPERTENSION: Primary | ICD-10-CM

## 2025-05-15 NOTE — PROGRESS NOTES
VBHM Score: 2     Colon Cancer Screening  Uncontrolled BP  Lipid panel    Pneumonia Vaccine  Shingles/Zoster Vaccine  RSV Vaccine          Health Maintenance Topic(s) Outreach Outcomes & Actions Taken:    Blood Pressure - Outreach Outcomes & Actions Taken  : PCP appt scheduled 6/5/25    Colorectal Cancer Screening - Outreach Outcomes & Actions Taken  : PCP appt scheduled 6/5/25, will discuss at that time    Lab(s) - Outreach Outcomes & Actions Taken  : order entered and lab appointment scheduled 6/5/25, patient will be fasting

## 2025-06-27 NOTE — TELEPHONE ENCOUNTER
Contacted pt. Pt requesting reschedule appt to 1/31/2020. Appt scheduled.  All questions answered.//lp   - - -

## 2025-07-07 ENCOUNTER — PATIENT OUTREACH (OUTPATIENT)
Dept: ADMINISTRATIVE | Facility: HOSPITAL | Age: 70
End: 2025-07-07
Payer: MEDICARE

## 2025-07-07 NOTE — PROGRESS NOTES
Statin registry - called pt to schedule pcp visit and lab work. Scheduled annual 07/28/25. Patient wanted labs scheduled for the same day and not the week before.